# Patient Record
Sex: MALE | Race: OTHER | NOT HISPANIC OR LATINO
[De-identification: names, ages, dates, MRNs, and addresses within clinical notes are randomized per-mention and may not be internally consistent; named-entity substitution may affect disease eponyms.]

---

## 2017-09-11 ENCOUNTER — RECORD ABSTRACTING (OUTPATIENT)
Age: 62
End: 2017-09-11

## 2017-09-11 DIAGNOSIS — M62.830 MUSCLE SPASM OF BACK: ICD-10-CM

## 2017-09-11 DIAGNOSIS — Z80.0 FAMILY HISTORY OF MALIGNANT NEOPLASM OF DIGESTIVE ORGANS: ICD-10-CM

## 2017-09-11 DIAGNOSIS — E78.5 HYPERLIPIDEMIA, UNSPECIFIED: ICD-10-CM

## 2017-09-11 DIAGNOSIS — Z87.09 PERSONAL HISTORY OF OTHER DISEASES OF THE RESPIRATORY SYSTEM: ICD-10-CM

## 2017-09-11 DIAGNOSIS — R53.83 OTHER MALAISE: ICD-10-CM

## 2017-09-11 DIAGNOSIS — Z87.891 PERSONAL HISTORY OF NICOTINE DEPENDENCE: ICD-10-CM

## 2017-09-11 DIAGNOSIS — Z80.42 FAMILY HISTORY OF MALIGNANT NEOPLASM OF PROSTATE: ICD-10-CM

## 2017-09-11 DIAGNOSIS — M48.06 SPINAL STENOSIS, LUMBAR REGION: ICD-10-CM

## 2017-09-11 DIAGNOSIS — R53.81 OTHER MALAISE: ICD-10-CM

## 2017-09-12 ENCOUNTER — APPOINTMENT (OUTPATIENT)
Dept: FAMILY MEDICINE | Facility: CLINIC | Age: 62
End: 2017-09-12
Payer: COMMERCIAL

## 2017-09-12 VITALS — HEIGHT: 70 IN | DIASTOLIC BLOOD PRESSURE: 94 MMHG | SYSTOLIC BLOOD PRESSURE: 150 MMHG

## 2017-09-12 PROCEDURE — 99213 OFFICE O/P EST LOW 20 MIN: CPT

## 2017-09-21 ENCOUNTER — RECORD ABSTRACTING (OUTPATIENT)
Age: 62
End: 2017-09-21

## 2017-10-09 ENCOUNTER — APPOINTMENT (OUTPATIENT)
Dept: FAMILY MEDICINE | Facility: CLINIC | Age: 62
End: 2017-10-09
Payer: COMMERCIAL

## 2017-10-09 VITALS
SYSTOLIC BLOOD PRESSURE: 140 MMHG | HEIGHT: 70 IN | WEIGHT: 180 LBS | DIASTOLIC BLOOD PRESSURE: 90 MMHG | BODY MASS INDEX: 25.77 KG/M2

## 2017-10-09 PROCEDURE — 99213 OFFICE O/P EST LOW 20 MIN: CPT

## 2017-10-09 RX ORDER — ALPRAZOLAM 1 MG/1
1 TABLET ORAL
Qty: 120 | Refills: 0 | Status: DISCONTINUED | COMMUNITY
Start: 2017-03-11 | End: 2017-10-09

## 2017-10-09 RX ORDER — OXYCODONE HYDROCHLORIDE AND ACETAMINOPHEN 10; 325 MG/1; MG/1
10-325 TABLET ORAL
Refills: 0 | Status: DISCONTINUED | COMMUNITY
End: 2017-10-09

## 2017-11-01 ENCOUNTER — RX RENEWAL (OUTPATIENT)
Age: 62
End: 2017-11-01

## 2017-11-13 ENCOUNTER — APPOINTMENT (OUTPATIENT)
Dept: FAMILY MEDICINE | Facility: CLINIC | Age: 62
End: 2017-11-13
Payer: COMMERCIAL

## 2017-11-13 VITALS — DIASTOLIC BLOOD PRESSURE: 80 MMHG | SYSTOLIC BLOOD PRESSURE: 140 MMHG | HEIGHT: 70 IN

## 2017-11-13 PROCEDURE — 99213 OFFICE O/P EST LOW 20 MIN: CPT

## 2017-12-11 ENCOUNTER — APPOINTMENT (OUTPATIENT)
Dept: FAMILY MEDICINE | Facility: CLINIC | Age: 62
End: 2017-12-11
Payer: COMMERCIAL

## 2017-12-11 VITALS — DIASTOLIC BLOOD PRESSURE: 98 MMHG | SYSTOLIC BLOOD PRESSURE: 162 MMHG | HEIGHT: 70 IN

## 2017-12-11 PROCEDURE — 99214 OFFICE O/P EST MOD 30 MIN: CPT

## 2017-12-11 RX ORDER — ALPRAZOLAM 1 MG/1
1 TABLET ORAL 4 TIMES DAILY
Qty: 120 | Refills: 0 | Status: DISCONTINUED | COMMUNITY
Start: 2017-09-12 | End: 2017-12-11

## 2017-12-11 RX ORDER — OXYCODONE AND ACETAMINOPHEN 10; 325 MG/1; MG/1
10-325 TABLET ORAL
Qty: 60 | Refills: 0 | Status: DISCONTINUED | COMMUNITY
Start: 2017-09-12 | End: 2017-12-11

## 2017-12-11 RX ORDER — ALPRAZOLAM 1 MG/1
1 TABLET ORAL 4 TIMES DAILY
Qty: 120 | Refills: 0 | Status: DISCONTINUED | COMMUNITY
Start: 2017-10-09 | End: 2017-12-11

## 2017-12-11 RX ORDER — OXYCODONE AND ACETAMINOPHEN 10; 325 MG/1; MG/1
10-325 TABLET ORAL
Qty: 60 | Refills: 0 | Status: DISCONTINUED | COMMUNITY
Start: 2017-10-09 | End: 2017-12-11

## 2018-01-05 ENCOUNTER — APPOINTMENT (OUTPATIENT)
Dept: FAMILY MEDICINE | Facility: CLINIC | Age: 63
End: 2018-01-05

## 2018-01-10 ENCOUNTER — APPOINTMENT (OUTPATIENT)
Dept: FAMILY MEDICINE | Facility: CLINIC | Age: 63
End: 2018-01-10
Payer: COMMERCIAL

## 2018-01-10 VITALS — HEIGHT: 70 IN | DIASTOLIC BLOOD PRESSURE: 100 MMHG | SYSTOLIC BLOOD PRESSURE: 160 MMHG

## 2018-01-10 DIAGNOSIS — Z00.00 ENCOUNTER FOR GENERAL ADULT MEDICAL EXAMINATION W/OUT ABNORMAL FINDINGS: ICD-10-CM

## 2018-01-10 PROCEDURE — 99214 OFFICE O/P EST MOD 30 MIN: CPT

## 2018-01-10 RX ORDER — ALPRAZOLAM 1 MG/1
1 TABLET ORAL
Qty: 120 | Refills: 0 | Status: DISCONTINUED | COMMUNITY
Start: 2017-11-13 | End: 2018-01-10

## 2018-01-10 RX ORDER — OXYCODONE AND ACETAMINOPHEN 10; 325 MG/1; MG/1
10-325 TABLET ORAL
Qty: 60 | Refills: 0 | Status: DISCONTINUED | COMMUNITY
Start: 2017-11-13 | End: 2018-01-10

## 2018-01-10 RX ORDER — CITALOPRAM 40 MG/1
40 TABLET, FILM COATED ORAL
Refills: 0 | Status: DISCONTINUED | COMMUNITY
Start: 2017-09-21 | End: 2018-01-10

## 2018-01-10 RX ORDER — NAPROXEN 500 MG/1
500 TABLET ORAL
Qty: 60 | Refills: 0 | Status: DISCONTINUED | COMMUNITY
Start: 2017-06-28 | End: 2018-01-10

## 2018-01-11 PROBLEM — Z00.00 ENCOUNTER FOR PREVENTIVE HEALTH EXAMINATION: Status: ACTIVE | Noted: 2017-09-11

## 2018-02-12 ENCOUNTER — APPOINTMENT (OUTPATIENT)
Dept: FAMILY MEDICINE | Facility: CLINIC | Age: 63
End: 2018-02-12
Payer: SELF-PAY

## 2018-02-12 VITALS — DIASTOLIC BLOOD PRESSURE: 100 MMHG | HEIGHT: 70 IN | SYSTOLIC BLOOD PRESSURE: 160 MMHG

## 2018-02-12 PROCEDURE — 99213 OFFICE O/P EST LOW 20 MIN: CPT

## 2018-02-12 RX ORDER — ALPRAZOLAM 1 MG/1
1 TABLET ORAL
Qty: 120 | Refills: 0 | Status: DISCONTINUED | COMMUNITY
Start: 2017-12-12 | End: 2018-02-12

## 2018-02-12 RX ORDER — OXYCODONE AND ACETAMINOPHEN 10; 325 MG/1; MG/1
10-325 TABLET ORAL
Qty: 60 | Refills: 0 | Status: DISCONTINUED | COMMUNITY
Start: 2018-01-10 | End: 2018-02-12

## 2018-03-14 ENCOUNTER — APPOINTMENT (OUTPATIENT)
Dept: FAMILY MEDICINE | Facility: CLINIC | Age: 63
End: 2018-03-14
Payer: SELF-PAY

## 2018-03-14 VITALS — SYSTOLIC BLOOD PRESSURE: 160 MMHG | HEIGHT: 70 IN | DIASTOLIC BLOOD PRESSURE: 100 MMHG

## 2018-03-14 PROCEDURE — 99213 OFFICE O/P EST LOW 20 MIN: CPT

## 2018-03-14 RX ORDER — ALPRAZOLAM 1 MG/1
1 TABLET ORAL
Qty: 120 | Refills: 0 | Status: DISCONTINUED | COMMUNITY
Start: 2018-01-10 | End: 2018-03-14

## 2018-03-14 RX ORDER — OXYCODONE AND ACETAMINOPHEN 10; 325 MG/1; MG/1
10-325 TABLET ORAL
Qty: 60 | Refills: 0 | Status: DISCONTINUED | COMMUNITY
Start: 2017-12-11 | End: 2018-03-14

## 2018-04-15 ENCOUNTER — RX RENEWAL (OUTPATIENT)
Age: 63
End: 2018-04-15

## 2018-04-16 ENCOUNTER — APPOINTMENT (OUTPATIENT)
Dept: FAMILY MEDICINE | Facility: CLINIC | Age: 63
End: 2018-04-16
Payer: COMMERCIAL

## 2018-04-16 PROCEDURE — 99213 OFFICE O/P EST LOW 20 MIN: CPT

## 2018-05-15 ENCOUNTER — APPOINTMENT (OUTPATIENT)
Dept: FAMILY MEDICINE | Facility: CLINIC | Age: 63
End: 2018-05-15
Payer: COMMERCIAL

## 2018-05-15 VITALS — SYSTOLIC BLOOD PRESSURE: 140 MMHG | HEIGHT: 70 IN | DIASTOLIC BLOOD PRESSURE: 82 MMHG

## 2018-05-15 PROCEDURE — 99213 OFFICE O/P EST LOW 20 MIN: CPT

## 2018-05-15 NOTE — REVIEW OF SYSTEMS
[Joint Pain] : joint pain [Joint Stiffness] : joint stiffness [Muscle Pain] : muscle pain [Muscle Weakness] : muscle weakness [Back Pain] : back pain [Joint Swelling] : no joint swelling [Negative] : Respiratory

## 2018-05-15 NOTE — PHYSICAL EXAM
[No Acute Distress] : no acute distress [Well Nourished] : well nourished [Well Developed] : well developed [Well-Appearing] : well-appearing [No Respiratory Distress] : no respiratory distress  [Clear to Auscultation] : lungs were clear to auscultation bilaterally [Normal Rate] : normal rate  [Regular Rhythm] : with a regular rhythm [Normal S1, S2] : normal S1 and S2 [No Murmur] : no murmur heard [No CVA Tenderness] : no CVA  tenderness [No Spinal Tenderness] : no spinal tenderness [de-identified] : BP elevated.  [de-identified] : Rt knee arthritic and decreased rom

## 2018-05-15 NOTE — HISTORY OF PRESENT ILLNESS
[FreeTextEntry1] : [facility] 1[facility] i qid \par  [facility]  [de-identified] : ISTOP ok.\par Now has insurance. Will be scheduling to see ortho.

## 2018-06-14 ENCOUNTER — APPOINTMENT (OUTPATIENT)
Dept: FAMILY MEDICINE | Facility: CLINIC | Age: 63
End: 2018-06-14
Payer: COMMERCIAL

## 2018-06-14 VITALS
WEIGHT: 180 LBS | DIASTOLIC BLOOD PRESSURE: 84 MMHG | BODY MASS INDEX: 25.77 KG/M2 | SYSTOLIC BLOOD PRESSURE: 142 MMHG | HEIGHT: 70 IN

## 2018-06-14 PROCEDURE — 99214 OFFICE O/P EST MOD 30 MIN: CPT | Mod: 25

## 2018-06-14 PROCEDURE — 36415 COLL VENOUS BLD VENIPUNCTURE: CPT

## 2018-06-14 NOTE — HISTORY OF PRESENT ILLNESS
[FreeTextEntry1] : the patient is here for medication refills on ALPRAZolam 1 mg,  and Oxycodone-acetaminophen  mg  [de-identified] : PT admits to "bottoming out past few weeks." Will be seeing psychiatrist

## 2018-06-14 NOTE — PHYSICAL EXAM
[No Acute Distress] : no acute distress [Well Nourished] : well nourished [Well Developed] : well developed [Well-Appearing] : well-appearing [Speech Grossly Normal] : speech grossly normal [Memory Grossly Normal] : memory grossly normal [Normal Affect] : the affect was normal [Alert and Oriented x3] : oriented to person, place, and time [Normal Mood] : the mood was normal [Normal Insight/Judgement] : insight and judgment were intact

## 2018-07-02 LAB
ALBUMIN SERPL ELPH-MCNC: 5.2 G/DL
ALP BLD-CCNC: 60 U/L
ALT SERPL-CCNC: 39 U/L
ANION GAP SERPL CALC-SCNC: 27 MMOL/L
AST SERPL-CCNC: 28 U/L
BASOPHILS # BLD AUTO: 0.04 K/UL
BASOPHILS NFR BLD AUTO: 0.5 %
BILIRUB SERPL-MCNC: 0.4 MG/DL
BUN SERPL-MCNC: 18 MG/DL
CALCIUM SERPL-MCNC: 10 MG/DL
CHLORIDE SERPL-SCNC: 97 MMOL/L
CHOLEST SERPL-MCNC: 238 MG/DL
CHOLEST/HDLC SERPL: 5.2 RATIO
CO2 SERPL-SCNC: 24 MMOL/L
CREAT SERPL-MCNC: 1.24 MG/DL
EOSINOPHIL # BLD AUTO: 0.08 K/UL
EOSINOPHIL NFR BLD AUTO: 1 %
GLUCOSE SERPL-MCNC: 95 MG/DL
HCT VFR BLD CALC: 47.8 %
HDLC SERPL-MCNC: 46 MG/DL
HGB BLD-MCNC: 15.1 G/DL
IMM GRANULOCYTES NFR BLD AUTO: 0.4 %
LDLC SERPL CALC-MCNC: 152 MG/DL
LYMPHOCYTES # BLD AUTO: 2.58 K/UL
LYMPHOCYTES NFR BLD AUTO: 31.9 %
MAN DIFF?: NORMAL
MCHC RBC-ENTMCNC: 31.6 GM/DL
MCHC RBC-ENTMCNC: 32.4 PG
MCV RBC AUTO: 102.6 FL
MONOCYTES # BLD AUTO: 0.81 K/UL
MONOCYTES NFR BLD AUTO: 10 %
NEUTROPHILS # BLD AUTO: 4.56 K/UL
NEUTROPHILS NFR BLD AUTO: 56.2 %
PLATELET # BLD AUTO: 198 K/UL
POTASSIUM SERPL-SCNC: 3.4 MMOL/L
PROT SERPL-MCNC: 7.7 G/DL
PSA SERPL-MCNC: 2.18 NG/ML
RBC # BLD: 4.66 M/UL
RBC # FLD: 14.3 %
SODIUM SERPL-SCNC: 148 MMOL/L
TRIGL SERPL-MCNC: 200 MG/DL
WBC # FLD AUTO: 8.1 K/UL

## 2018-07-05 ENCOUNTER — TRANSCRIPTION ENCOUNTER (OUTPATIENT)
Age: 63
End: 2018-07-05

## 2018-07-12 ENCOUNTER — APPOINTMENT (OUTPATIENT)
Dept: FAMILY MEDICINE | Facility: CLINIC | Age: 63
End: 2018-07-12
Payer: COMMERCIAL

## 2018-07-12 VITALS
WEIGHT: 180 LBS | DIASTOLIC BLOOD PRESSURE: 98 MMHG | BODY MASS INDEX: 25.2 KG/M2 | SYSTOLIC BLOOD PRESSURE: 160 MMHG | HEIGHT: 71 IN

## 2018-07-12 DIAGNOSIS — M79.672 PAIN IN LEFT FOOT: ICD-10-CM

## 2018-07-12 DIAGNOSIS — M25.532 PAIN IN LEFT WRIST: ICD-10-CM

## 2018-07-12 PROCEDURE — 99214 OFFICE O/P EST MOD 30 MIN: CPT

## 2018-07-12 NOTE — ASSESSMENT
[FreeTextEntry1] : strain l wrist. rec light activity and aspercreme\par \par Foot pain - refer to podiatry

## 2018-07-12 NOTE — HISTORY OF PRESENT ILLNESS
[FreeTextEntry1] : pt is here for medication refills and also here to go over b/w that he recently had done. Pt complaints of having pain on his right foot. painful to walk without sneakers around the house. painful behind the two middle toes. no buumps. Wants to look at his left wrist. hurts to flex and move around. pain travels right up his arm.\par \par oxycodone-acetaminophen  mg, alprazolam 1 mg , indomethacin 50 mg oral tablet.\par \[facility] [place]  [de-identified] : PT admits to "bottoming out past few weeks." seeing psychiatrist

## 2018-07-12 NOTE — PHYSICAL EXAM
[No Acute Distress] : no acute distress [Well Nourished] : well nourished [Well Developed] : well developed [Well-Appearing] : well-appearing [No Respiratory Distress] : no respiratory distress  [Clear to Auscultation] : lungs were clear to auscultation bilaterally [Normal Rate] : normal rate  [Regular Rhythm] : with a regular rhythm [No Joint Swelling] : no joint swelling [Grossly Normal Strength/Tone] : grossly normal strength/tone [Speech Grossly Normal] : speech grossly normal [Memory Grossly Normal] : memory grossly normal [Normal Affect] : the affect was normal [Alert and Oriented x3] : oriented to person, place, and time [Normal Mood] : the mood was normal [Normal Insight/Judgement] : insight and judgment were intact

## 2018-08-10 ENCOUNTER — APPOINTMENT (OUTPATIENT)
Dept: FAMILY MEDICINE | Facility: CLINIC | Age: 63
End: 2018-08-10
Payer: COMMERCIAL

## 2018-08-10 VITALS
DIASTOLIC BLOOD PRESSURE: 82 MMHG | SYSTOLIC BLOOD PRESSURE: 162 MMHG | WEIGHT: 180 LBS | HEIGHT: 71 IN | BODY MASS INDEX: 25.2 KG/M2

## 2018-08-10 VITALS — WEIGHT: 180 LBS | BODY MASS INDEX: 25.2 KG/M2 | HEIGHT: 71 IN

## 2018-08-10 PROCEDURE — 99213 OFFICE O/P EST LOW 20 MIN: CPT

## 2018-08-10 NOTE — HISTORY OF PRESENT ILLNESS
[FreeTextEntry1] : pt is here for medical refill, and to go over bloodwork results.  [de-identified] : PT admits to "bottoming out past few weeks." seeing psychiatrist\par \par Better now

## 2018-08-10 NOTE — HISTORY OF PRESENT ILLNESS
[FreeTextEntry1] : pt is here for medical refill, and to go over bloodwork results.  [de-identified] : PT admits to "bottoming out past few weeks." seeing psychiatrist\par \par Better now

## 2018-08-10 NOTE — ASSESSMENT
[FreeTextEntry1] : strain l wrist. rec light activity and aspercreme\par \par Foof pain - refer to podiatry\par \par Going to [facility] then ortho\par \par Minimize meds

## 2018-08-10 NOTE — HISTORY OF PRESENT ILLNESS
[FreeTextEntry1] : pt is here for medical refill, and to go over bloodwork results.  [de-identified] : PT admits to "bottoming out past few weeks." seeing psychiatrist\par \par Better now

## 2018-09-06 ENCOUNTER — APPOINTMENT (OUTPATIENT)
Dept: FAMILY MEDICINE | Facility: CLINIC | Age: 63
End: 2018-09-06
Payer: COMMERCIAL

## 2018-09-06 VITALS
BODY MASS INDEX: 25.2 KG/M2 | HEIGHT: 71 IN | WEIGHT: 180 LBS | SYSTOLIC BLOOD PRESSURE: 140 MMHG | DIASTOLIC BLOOD PRESSURE: 70 MMHG

## 2018-09-06 PROCEDURE — 99213 OFFICE O/P EST LOW 20 MIN: CPT

## 2018-09-06 RX ORDER — INDOMETHACIN 50 MG/1
50 CAPSULE ORAL
Refills: 0 | Status: DISCONTINUED | COMMUNITY
Start: 2017-09-21 | End: 2018-09-06

## 2018-09-06 NOTE — ASSESSMENT
[FreeTextEntry1] :  Wanted increase in pain med but I refused. Will add meloxicam\par \par \par  \par Minimize meds

## 2018-09-06 NOTE — HISTORY OF PRESENT ILLNESS
[FreeTextEntry1] : pt is here for a medication refill, Alprazolam 1 MG and Oxycodone- Acetamiophen  MG\par \par [facility] [de-identified] : Seen by ortho. Planning PT, steroid injections and eventual hip replacement in jan/feb 2019\par \par

## 2018-10-02 ENCOUNTER — APPOINTMENT (OUTPATIENT)
Dept: FAMILY MEDICINE | Facility: CLINIC | Age: 63
End: 2018-10-02
Payer: COMMERCIAL

## 2018-10-02 VITALS
DIASTOLIC BLOOD PRESSURE: 82 MMHG | HEIGHT: 70 IN | SYSTOLIC BLOOD PRESSURE: 138 MMHG | BODY MASS INDEX: 25.77 KG/M2 | WEIGHT: 180 LBS

## 2018-10-02 PROCEDURE — 99214 OFFICE O/P EST MOD 30 MIN: CPT

## 2018-10-02 NOTE — HISTORY OF PRESENT ILLNESS
[FreeTextEntry1] : Pt is here for medication refill .Alprazolam 1 MG and Oxycodone- Acetaminophen  MG\par \[facility] [place]  [de-identified] : Seen by ortho. Planning PT, steroid injections and eventual hip replacement in jan/feb 2019\par \par Will be seeing ortho again in Nove,mber\par

## 2018-10-07 ENCOUNTER — RX RENEWAL (OUTPATIENT)
Age: 63
End: 2018-10-07

## 2018-11-01 ENCOUNTER — APPOINTMENT (OUTPATIENT)
Dept: FAMILY MEDICINE | Facility: CLINIC | Age: 63
End: 2018-11-01
Payer: COMMERCIAL

## 2018-11-01 VITALS
BODY MASS INDEX: 25.77 KG/M2 | WEIGHT: 180 LBS | SYSTOLIC BLOOD PRESSURE: 144 MMHG | DIASTOLIC BLOOD PRESSURE: 80 MMHG | HEIGHT: 70 IN

## 2018-11-01 PROCEDURE — 90686 IIV4 VACC NO PRSV 0.5 ML IM: CPT

## 2018-11-01 PROCEDURE — 99213 OFFICE O/P EST LOW 20 MIN: CPT | Mod: 25

## 2018-11-01 PROCEDURE — G0008: CPT

## 2018-11-05 NOTE — HISTORY OF PRESENT ILLNESS
[FreeTextEntry1] : Patient is here for medication refill. Alprazolam 1 MG, Oxycodone- Acetaminophen  MG and TriamtereneHCTZ 37.5-25 MG.\par \par [facility] [de-identified] : Seen by ortho. Planning PT, steroid injections and eventual hip replacement in jan/feb 2019\par \par Will be seeing ortho again in Nove,mber\par

## 2018-11-29 ENCOUNTER — APPOINTMENT (OUTPATIENT)
Dept: FAMILY MEDICINE | Facility: CLINIC | Age: 63
End: 2018-11-29
Payer: COMMERCIAL

## 2018-11-29 VITALS
HEIGHT: 70 IN | BODY MASS INDEX: 25.77 KG/M2 | WEIGHT: 180 LBS | SYSTOLIC BLOOD PRESSURE: 134 MMHG | DIASTOLIC BLOOD PRESSURE: 82 MMHG

## 2018-11-29 PROCEDURE — 99213 OFFICE O/P EST LOW 20 MIN: CPT

## 2018-11-29 NOTE — HISTORY OF PRESENT ILLNESS
[FreeTextEntry1] : The patient is here for medication refills on Alprazolam 1 mg, Oxycodone-acetaminophen  mg, and Triamterene-hctz 37.5-25 mg [de-identified] : says he has been seen by ortho and most likely will have surgery in Feb

## 2018-12-28 ENCOUNTER — APPOINTMENT (OUTPATIENT)
Dept: FAMILY MEDICINE | Facility: CLINIC | Age: 63
End: 2018-12-28
Payer: SELF-PAY

## 2018-12-28 VITALS
BODY MASS INDEX: 33.99 KG/M2 | DIASTOLIC BLOOD PRESSURE: 84 MMHG | HEIGHT: 61 IN | SYSTOLIC BLOOD PRESSURE: 144 MMHG | WEIGHT: 180 LBS

## 2018-12-28 PROCEDURE — 99213 OFFICE O/P EST LOW 20 MIN: CPT

## 2018-12-30 NOTE — HISTORY OF PRESENT ILLNESS
[FreeTextEntry1] : The patient is here for medication refills on Alprazolam 1 mg, , oxycodone-acetaminophen  mg\par \par [facility] [de-identified] : Patient hasn't been feeling good since Wednesday. Believes he might have a stomach virus.

## 2019-01-24 ENCOUNTER — APPOINTMENT (OUTPATIENT)
Dept: FAMILY MEDICINE | Facility: CLINIC | Age: 64
End: 2019-01-24
Payer: SELF-PAY

## 2019-01-24 VITALS
DIASTOLIC BLOOD PRESSURE: 80 MMHG | BODY MASS INDEX: 25.77 KG/M2 | SYSTOLIC BLOOD PRESSURE: 142 MMHG | WEIGHT: 180 LBS | HEIGHT: 70 IN

## 2019-01-24 PROCEDURE — 99213 OFFICE O/P EST LOW 20 MIN: CPT

## 2019-01-28 NOTE — PHYSICAL EXAM
[No Acute Distress] : no acute distress [Well Nourished] : well nourished [Well Developed] : well developed [Well-Appearing] : well-appearing [de-identified] : Nodules on palm

## 2019-01-28 NOTE — HISTORY OF PRESENT ILLNESS
[FreeTextEntry8] : Patient is here for two lumps on the inside of his left hand that have been getting bigger recently. Patient admits that he has been here before in the past because of this and it is getting to the point where he can not use his hand because they are on a nerve in his hand. Also needs a refill on Alprazolam 1 MG and Oxycodone- Acetaminophen  MG.\par \par [facility]

## 2019-02-26 ENCOUNTER — APPOINTMENT (OUTPATIENT)
Dept: FAMILY MEDICINE | Facility: CLINIC | Age: 64
End: 2019-02-26
Payer: SELF-PAY

## 2019-02-26 VITALS
HEIGHT: 70 IN | DIASTOLIC BLOOD PRESSURE: 112 MMHG | BODY MASS INDEX: 25.77 KG/M2 | SYSTOLIC BLOOD PRESSURE: 162 MMHG | WEIGHT: 180 LBS

## 2019-02-26 PROCEDURE — 99213 OFFICE O/P EST LOW 20 MIN: CPT

## 2019-02-26 NOTE — HISTORY OF PRESENT ILLNESS
[FreeTextEntry1] : Patient is here for medication refill. Alprazolam 1 MG, Oxycodone- Acetaminophen 7.5-325 MG.\par Pt reports he stopped taking Triamterene HCTZ 37.5-25 MG about 3 months ago, would like to try to control his BP on his own, but mentions he drank a lot of coffee this morning and knows that his BP will be high this morning.  \par  \par [facility] [de-identified] : says he has been seen by ortho and most likely will have surgery in Feb\par But no insurance in place yet\par \par Drinking excessive amount of coffee and has not taken any BP meds for about 3 months

## 2019-02-26 NOTE — PHYSICAL EXAM
[No Acute Distress] : no acute distress [Well Nourished] : well nourished [Well Developed] : well developed [Well-Appearing] : well-appearing [No Respiratory Distress] : no respiratory distress  [Clear to Auscultation] : lungs were clear to auscultation bilaterally [Normal Rate] : normal rate  [Regular Rhythm] : with a regular rhythm [Normal S1, S2] : normal S1 and S2 [No Murmur] : no murmur heard [de-identified] : BP ekevated [de-identified] : Nodules on palm

## 2019-03-25 ENCOUNTER — APPOINTMENT (OUTPATIENT)
Dept: FAMILY MEDICINE | Facility: CLINIC | Age: 64
End: 2019-03-25
Payer: SELF-PAY

## 2019-03-25 VITALS
HEIGHT: 70 IN | DIASTOLIC BLOOD PRESSURE: 80 MMHG | WEIGHT: 180 LBS | SYSTOLIC BLOOD PRESSURE: 132 MMHG | BODY MASS INDEX: 25.77 KG/M2

## 2019-03-25 PROCEDURE — 99213 OFFICE O/P EST LOW 20 MIN: CPT

## 2019-03-25 NOTE — HISTORY OF PRESENT ILLNESS
[FreeTextEntry1] : The patient is here for medication refills on Alprazolam 1 mg, and oxycodone  mg. \par \par [facility]  [de-identified] : says he has been seen by ortho and most likely will have surgery \par But no insurance in place yet\par \par

## 2019-03-25 NOTE — PHYSICAL EXAM
[No Acute Distress] : no acute distress [Well Nourished] : well nourished [Well Developed] : well developed [Well-Appearing] : well-appearing [No Respiratory Distress] : no respiratory distress  [Clear to Auscultation] : lungs were clear to auscultation bilaterally [Normal Rate] : normal rate  [Regular Rhythm] : with a regular rhythm [Normal S1, S2] : normal S1 and S2 [No Murmur] : no murmur heard [de-identified] : Nodules on palm

## 2019-04-20 ENCOUNTER — APPOINTMENT (OUTPATIENT)
Dept: FAMILY MEDICINE | Facility: CLINIC | Age: 64
End: 2019-04-20
Payer: SELF-PAY

## 2019-04-20 VITALS
HEIGHT: 70 IN | DIASTOLIC BLOOD PRESSURE: 82 MMHG | BODY MASS INDEX: 25.77 KG/M2 | WEIGHT: 180 LBS | SYSTOLIC BLOOD PRESSURE: 130 MMHG

## 2019-04-20 PROCEDURE — 99213 OFFICE O/P EST LOW 20 MIN: CPT

## 2019-04-20 NOTE — PHYSICAL EXAM
[No Acute Distress] : no acute distress [Well Nourished] : well nourished [Well Developed] : well developed [No Respiratory Distress] : no respiratory distress  [Well-Appearing] : well-appearing [Clear to Auscultation] : lungs were clear to auscultation bilaterally [Normal Rate] : normal rate  [Regular Rhythm] : with a regular rhythm [Normal S1, S2] : normal S1 and S2 [No Murmur] : no murmur heard [de-identified] : Nodules on palm

## 2019-04-20 NOTE — PLAN
[FreeTextEntry1] : PT ADVISED THIS IS THE LAST RX FOR PERCOCET.ADVISED TO SEEK  PAIN MANAGEMENT  [facility]\par RE START MELOXICAM

## 2019-04-20 NOTE — HISTORY OF PRESENT ILLNESS
[FreeTextEntry1] : medication refill. Alprazolam 1 MG, Oxycodone- Acetaminophen  MG.\par \par [facility] [place] [de-identified] : says he has been seen by ortho and most likely will have surgery \par But no insurance in place yet\par \par

## 2019-05-01 ENCOUNTER — APPOINTMENT (OUTPATIENT)
Dept: FAMILY MEDICINE | Facility: CLINIC | Age: 64
End: 2019-05-01
Payer: SELF-PAY

## 2019-05-01 VITALS — SYSTOLIC BLOOD PRESSURE: 190 MMHG | OXYGEN SATURATION: 98 % | DIASTOLIC BLOOD PRESSURE: 110 MMHG | HEART RATE: 65 BPM

## 2019-05-01 VITALS
BODY MASS INDEX: 25.48 KG/M2 | HEIGHT: 70 IN | DIASTOLIC BLOOD PRESSURE: 102 MMHG | WEIGHT: 178 LBS | SYSTOLIC BLOOD PRESSURE: 160 MMHG

## 2019-05-01 DIAGNOSIS — Z86.19 PERSONAL HISTORY OF OTHER INFECTIOUS AND PARASITIC DISEASES: ICD-10-CM

## 2019-05-01 DIAGNOSIS — I16.0 HYPERTENSIVE URGENCY: ICD-10-CM

## 2019-05-01 DIAGNOSIS — H53.8 OTHER VISUAL DISTURBANCES: ICD-10-CM

## 2019-05-01 DIAGNOSIS — R11.0 NAUSEA: ICD-10-CM

## 2019-05-01 PROCEDURE — 99214 OFFICE O/P EST MOD 30 MIN: CPT

## 2019-05-01 RX ORDER — TRIAMTERENE AND HYDROCHLOROTHIAZIDE 37.5; 25 MG/1; MG/1
37.5-25 CAPSULE ORAL DAILY
Qty: 90 | Refills: 1 | Status: DISCONTINUED | COMMUNITY
Start: 2018-02-12 | End: 2019-05-01

## 2019-05-01 RX ORDER — LISINOPRIL 40 MG/1
40 TABLET ORAL
Qty: 90 | Refills: 1 | Status: DISCONTINUED | COMMUNITY
Start: 2017-05-18 | End: 2019-05-01

## 2019-05-01 NOTE — ASSESSMENT
[FreeTextEntry1] : Sent to hospital immediately.\par Needs STAT labs and CT/MRI head urgently.\par \par Mike expresses apprehension with going to the hospital due to lack of health insurance.\par \par Explained that the risks are too high and that he needs to go the hospital immediately, he expresses understanding.

## 2019-05-01 NOTE — HISTORY OF PRESENT ILLNESS
[FreeTextEntry8] : Mike is a 64-year old male who presents with a 1-week history of very severe dizziness, blurry vision, and headaches. He reports that as a result he has been off balance - falling over to the left 3 times (no head trauma because he was able to catch himself). Also associated with nausea (no vomiting), diarrhea, chills, headaches, seeing dots, and buzzing in ears. Nothing makes it better. \par Reports that something similar happened 6-7 years ago - at the time he was hospitalized for about 5 days. However, testing was inconclusive.\par \par Reports that he has not been taking BP medication for 3 months.\par \par The patient presents today because he has been feeling off balance since last Wednesday morning. pt states he felt nausea, chills, and diarrhea. he believes it might be vertigo. pt has a hard time walking across the room,  pt fell 3 times over the past week. Pt reports he feels very dizzy, blurry vision, headaches. he can't drive. feels very fatigue. is holding on to objects in order to walk.

## 2019-05-01 NOTE — PHYSICAL EXAM
[Normal Sclera/Conjunctiva] : normal sclera/conjunctiva [PERRL] : pupils equal round and reactive to light [EOMI] : extraocular movements intact [Normal Outer Ear/Nose] : the outer ears and nose were normal in appearance [Normal TMs] : both tympanic membranes were normal [Normal Oropharynx] : the oropharynx was normal [No Lymphadenopathy] : no lymphadenopathy [Clear to Auscultation] : lungs were clear to auscultation bilaterally [No Respiratory Distress] : no respiratory distress  [Normal Rate] : normal rate  [No Accessory Muscle Use] : no accessory muscle use [Normal S1, S2] : normal S1 and S2 [No Carotid Bruits] : no carotid bruits [Pedal Pulses Present] : the pedal pulses are present [No Edema] : there was no peripheral edema [Non-distended] : non-distended [No Focal Deficits] : no focal deficits [Normal Insight/Judgement] : insight and judgment were intact [Alert and Oriented x3] : oriented to person, place, and time [de-identified] : mild distress [de-identified] : unsteady gait

## 2019-05-09 ENCOUNTER — APPOINTMENT (OUTPATIENT)
Dept: FAMILY MEDICINE | Facility: CLINIC | Age: 64
End: 2019-05-09
Payer: SELF-PAY

## 2019-05-09 VITALS
BODY MASS INDEX: 25.48 KG/M2 | SYSTOLIC BLOOD PRESSURE: 150 MMHG | WEIGHT: 178 LBS | HEIGHT: 70 IN | DIASTOLIC BLOOD PRESSURE: 80 MMHG

## 2019-05-09 PROCEDURE — 99213 OFFICE O/P EST LOW 20 MIN: CPT

## 2019-05-13 NOTE — HISTORY OF PRESENT ILLNESS
[Post-hospitalization from ___ Hospital] : Post-hospitalization from [unfilled] Hospital [Admitted on: ___] : The patient was admitted on [unfilled] [Discharged on ___] : discharged on [unfilled] [Discharge Summary] : discharge summary [Pertinent Labs] : pertinent labs [Radiology Findings] : radiology findings [Discharge Med List] : discharge medication list [Med Reconciliation] : medication reconciliation has been completed [FreeTextEntry2] : Patient reports he is better but is still dizzy, and off balance. Reports it is better than it was. He is able to drive and work but is very cautious. Will drive very slow, and will not climb ladders at work. \par Pt was given new medications. Amlodipine 10 Mg, Aspirin 81 MG, Hydrochlorothiazide 25 MG, Thiamine 100 MG.\par cardiac and neuro work ip was neg

## 2019-05-21 ENCOUNTER — APPOINTMENT (OUTPATIENT)
Dept: FAMILY MEDICINE | Facility: CLINIC | Age: 64
End: 2019-05-21
Payer: SELF-PAY

## 2019-05-21 VITALS
WEIGHT: 178 LBS | DIASTOLIC BLOOD PRESSURE: 80 MMHG | HEIGHT: 70 IN | SYSTOLIC BLOOD PRESSURE: 140 MMHG | BODY MASS INDEX: 25.48 KG/M2

## 2019-05-21 DIAGNOSIS — H81.09 MENIERE'S DISEASE, UNSPECIFIED EAR: ICD-10-CM

## 2019-05-21 DIAGNOSIS — Z86.59 PERSONAL HISTORY OF OTHER MENTAL AND BEHAVIORAL DISORDERS: ICD-10-CM

## 2019-05-21 PROCEDURE — 99213 OFFICE O/P EST LOW 20 MIN: CPT

## 2019-05-27 NOTE — HISTORY OF PRESENT ILLNESS
[FreeTextEntry1] : Patient is here for a follow up with complaints that he is nothing doing better. Pt states he is still dizzy and off balance and nothing is getting better from the last time he was here.  Also needs medication refill, Alprazolam 1 MG, and possibly a half supply of Oxycodone- acetaminophen  MG.\par [facility] \[facility]  [de-identified] : says he has been seen by ortho and most likely will have surgery \par But no insurance in place yet\par  and as per cc\par

## 2019-06-06 ENCOUNTER — APPOINTMENT (OUTPATIENT)
Dept: OTOLARYNGOLOGY | Facility: CLINIC | Age: 64
End: 2019-06-06
Payer: MEDICAID

## 2019-06-06 VITALS
BODY MASS INDEX: 25.77 KG/M2 | WEIGHT: 180 LBS | HEART RATE: 71 BPM | HEIGHT: 70 IN | DIASTOLIC BLOOD PRESSURE: 89 MMHG | SYSTOLIC BLOOD PRESSURE: 157 MMHG

## 2019-06-06 DIAGNOSIS — Z78.9 OTHER SPECIFIED HEALTH STATUS: ICD-10-CM

## 2019-06-06 DIAGNOSIS — H90.3 SENSORINEURAL HEARING LOSS, BILATERAL: ICD-10-CM

## 2019-06-06 DIAGNOSIS — R42 DIZZINESS AND GIDDINESS: ICD-10-CM

## 2019-06-06 DIAGNOSIS — D35.00 BENIGN NEOPLASM OF UNSPECIFIED ADRENAL GLAND: ICD-10-CM

## 2019-06-06 PROCEDURE — 92557 COMPREHENSIVE HEARING TEST: CPT

## 2019-06-06 PROCEDURE — 99203 OFFICE O/P NEW LOW 30 MIN: CPT | Mod: 25

## 2019-06-06 NOTE — REVIEW OF SYSTEMS
[Dizziness] : dizziness [Vertigo] : vertigo [Anxiety] : anxiety [Lightheadedness] : lightheadedness [Negative] : Heme/Lymph [FreeTextEntry1] : headache  fatigue
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
## 2019-06-06 NOTE — CONSULT LETTER
[Dear  ___] : Dear  [unfilled], [Consult Letter:] : I had the pleasure of evaluating your patient, [unfilled]. [Please see my note below.] : Please see my note below. [Consult Closing:] : Thank you very much for allowing me to participate in the care of this patient.  If you have any questions, please do not hesitate to contact me. [Sincerely,] : Sincerely, [FreeTextEntry3] : Sincerely,\par \par Abdulkadir Phillips MD., FACS\par

## 2019-06-06 NOTE — DATA REVIEWED
[de-identified] : audio reviewed today - bilat snhl with A tymp [de-identified] : reports from SB reviewed

## 2019-06-06 NOTE — ASSESSMENT
[FreeTextEntry1] : Patient with intermittent severe headaches which have been related to severe spikes in BP.  Work up so far negative.  Does feel lightheaded during episodes but no episodes of spinning.  Also gives hx of blackouts during episodes.  Had similar issue about 10 years ago.  Now feels improved but has occ headache.  \par Feel current issue is related to hypertension and likely not vestibular.  Would consider also atypical migraines.  Also, while unlikely feel consideration of pheo or paraganglioma shoud be considered.  I ordered screening plasma metanephrine.  Also discussed further neurology eval and consider of endocrinology eval.

## 2019-06-17 LAB
METANEPHRINE, PL: 35 PG/ML
NORMETANEPHRINE, PL: 138 PG/ML

## 2019-06-21 ENCOUNTER — APPOINTMENT (OUTPATIENT)
Dept: FAMILY MEDICINE | Facility: CLINIC | Age: 64
End: 2019-06-21
Payer: MEDICAID

## 2019-06-21 VITALS
HEIGHT: 70 IN | DIASTOLIC BLOOD PRESSURE: 80 MMHG | WEIGHT: 180 LBS | BODY MASS INDEX: 25.77 KG/M2 | SYSTOLIC BLOOD PRESSURE: 148 MMHG

## 2019-06-21 DIAGNOSIS — G43.009 MIGRAINE W/OUT AURA, NOT INTRACTABLE, W/OUT STATUS MIGRAINOSUS: ICD-10-CM
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
## 2019-06-21 PROCEDURE — 99213 OFFICE O/P EST LOW 20 MIN: CPT | Mod: 25

## 2019-06-21 PROCEDURE — 36415 COLL VENOUS BLD VENIPUNCTURE: CPT

## 2019-06-21 RX ORDER — OXYCODONE AND ACETAMINOPHEN 10; 325 MG/1; MG/1
10-325 TABLET ORAL TWICE DAILY
Qty: 60 | Refills: 0 | Status: DISCONTINUED | COMMUNITY
Start: 2018-06-14 | End: 2019-06-21

## 2019-06-21 RX ORDER — OXYCODONE AND ACETAMINOPHEN 10; 325 MG/1; MG/1
10-325 TABLET ORAL
Qty: 60 | Refills: 0 | Status: DISCONTINUED | COMMUNITY
Start: 2018-03-14 | End: 2019-06-21

## 2019-06-21 RX ORDER — OXYCODONE AND ACETAMINOPHEN 7.5; 325 MG/1; MG/1
7.5-325 TABLET ORAL
Qty: 60 | Refills: 0 | Status: DISCONTINUED | COMMUNITY
Start: 2018-04-16 | End: 2019-06-21

## 2019-06-21 RX ORDER — OXYCODONE AND ACETAMINOPHEN 7.5; 325 MG/1; MG/1
7.5-325 TABLET ORAL
Qty: 60 | Refills: 0 | Status: DISCONTINUED | COMMUNITY
Start: 2018-07-12 | End: 2019-06-21

## 2019-06-21 RX ORDER — OXYCODONE AND ACETAMINOPHEN 10; 325 MG/1; MG/1
10-325 TABLET ORAL
Qty: 60 | Refills: 0 | Status: DISCONTINUED | COMMUNITY
Start: 2018-02-12 | End: 2019-06-21

## 2019-06-21 RX ORDER — OXYCODONE AND ACETAMINOPHEN 10; 325 MG/1; MG/1
10-325 TABLET ORAL
Qty: 60 | Refills: 0 | Status: DISCONTINUED | COMMUNITY
Start: 2018-05-15 | End: 2019-06-21

## 2019-06-21 NOTE — REVIEW OF SYSTEMS
[Headache] : headache [Dizziness] : dizziness [Negative] : Genitourinary [FreeTextEntry9] : as per cc [de-identified] : FRontal

## 2019-06-21 NOTE — HISTORY OF PRESENT ILLNESS
[FreeTextEntry1] : Med refill. Meloxicam 7.5 MG, Alprazolam 1 MG, and amlodipine.\par Would also like to discuss recent headaches and possible starting a medication for that. [de-identified] : Will be seeing neurologist next Friday.\par Doing very well with meloxicam

## 2019-06-21 NOTE — PHYSICAL EXAM
[No Acute Distress] : no acute distress [Well Nourished] : well nourished [Well Developed] : well developed [Well-Appearing] : well-appearing [Normal Gait] : normal gait [Coordination Grossly Intact] : coordination grossly intact [No Focal Deficits] : no focal deficits [Normal Affect] : the affect was normal [Normal Insight/Judgement] : insight and judgment were intact [Alert and Oriented x3] : oriented to person, place, and time [de-identified] : still bouts of vertigo

## 2019-06-27 ENCOUNTER — OTHER (OUTPATIENT)
Age: 64
End: 2019-06-27

## 2019-06-27 LAB
A PHAGOCYTOPH IGG TITR SER IF: NORMAL TITER
B BURGDOR AB SER QL IA: NEGATIVE
B MICROTI IGG TITR SER: NORMAL TITER
E CHAFFEENSIS IGG TITR SER IF: ABNORMAL TITER

## 2019-07-09 ENCOUNTER — APPOINTMENT (OUTPATIENT)
Dept: OTOLARYNGOLOGY | Facility: CLINIC | Age: 64
End: 2019-07-09

## 2019-07-22 ENCOUNTER — APPOINTMENT (OUTPATIENT)
Dept: FAMILY MEDICINE | Facility: CLINIC | Age: 64
End: 2019-07-22
Payer: MEDICAID

## 2019-07-22 VITALS
HEIGHT: 70 IN | DIASTOLIC BLOOD PRESSURE: 80 MMHG | BODY MASS INDEX: 26.63 KG/M2 | WEIGHT: 186 LBS | SYSTOLIC BLOOD PRESSURE: 142 MMHG

## 2019-07-22 DIAGNOSIS — B60.0 BABESIOSIS: ICD-10-CM

## 2019-07-22 PROCEDURE — 99214 OFFICE O/P EST MOD 30 MIN: CPT | Mod: 25

## 2019-07-22 PROCEDURE — 36415 COLL VENOUS BLD VENIPUNCTURE: CPT

## 2019-07-23 ENCOUNTER — APPOINTMENT (OUTPATIENT)
Dept: NEUROLOGY | Facility: CLINIC | Age: 64
End: 2019-07-23

## 2019-07-23 LAB
ALBUMIN SERPL ELPH-MCNC: 4.8 G/DL
ALP BLD-CCNC: 70 U/L
ALT SERPL-CCNC: 24 U/L
ANION GAP SERPL CALC-SCNC: 22 MMOL/L
AST SERPL-CCNC: 20 U/L
BILIRUB SERPL-MCNC: 0.3 MG/DL
BUN SERPL-MCNC: 22 MG/DL
CALCIUM SERPL-MCNC: 9.5 MG/DL
CHLORIDE SERPL-SCNC: 103 MMOL/L
CHOLEST SERPL-MCNC: 249 MG/DL
CHOLEST/HDLC SERPL: 3.5 RATIO
CO2 SERPL-SCNC: 25 MMOL/L
CREAT SERPL-MCNC: 0.97 MG/DL
GLUCOSE SERPL-MCNC: 191 MG/DL
HDLC SERPL-MCNC: 72 MG/DL
LDLC SERPL CALC-MCNC: 143 MG/DL
POTASSIUM SERPL-SCNC: 4 MMOL/L
PROT SERPL-MCNC: 7.2 G/DL
PSA SERPL-MCNC: 2.73 NG/ML
SODIUM SERPL-SCNC: 150 MMOL/L
TRIGL SERPL-MCNC: 170 MG/DL

## 2019-07-25 LAB
BASOPHILS # BLD AUTO: 0.07 K/UL
BASOPHILS NFR BLD AUTO: 0.7 %
EOSINOPHIL # BLD AUTO: 0.09 K/UL
EOSINOPHIL NFR BLD AUTO: 0.9 %
HCT VFR BLD CALC: 48.5 %
HGB BLD-MCNC: 14.7 G/DL
IMM GRANULOCYTES NFR BLD AUTO: 1.2 %
LYMPHOCYTES # BLD AUTO: 1.38 K/UL
LYMPHOCYTES NFR BLD AUTO: 14.3 %
MAN DIFF?: NORMAL
MCHC RBC-ENTMCNC: 30.3 GM/DL
MCHC RBC-ENTMCNC: 32 PG
MCV RBC AUTO: 105.4 FL
MONOCYTES # BLD AUTO: 0.58 K/UL
MONOCYTES NFR BLD AUTO: 6 %
NEUTROPHILS # BLD AUTO: 7.39 K/UL
NEUTROPHILS NFR BLD AUTO: 76.9 %
PLATELET # BLD AUTO: 202 K/UL
RBC # BLD: 4.6 M/UL
RBC # FLD: 14.3 %
WBC # FLD AUTO: 9.63 K/UL

## 2019-07-25 NOTE — HISTORY OF PRESENT ILLNESS
[FreeTextEntry1] : Patient is here for medication refills on Alprazolam 1 mg, Meloxicam 7.5 mg\par \par [facility] \par  [de-identified] : Feels better with antiobiotics

## 2019-07-25 NOTE — PHYSICAL EXAM
[Normal] : normal rate, regular rhythm, normal S1 and S2 and no murmur heard [No Carotid Bruits] : no carotid bruits [No Edema] : there was no peripheral edema

## 2019-07-25 NOTE — REVIEW OF SYSTEMS
[Headache] : headache [Dizziness] : dizziness [Negative] : Genitourinary [FreeTextEntry9] : as per cc [de-identified] : FRontal

## 2019-07-26 LAB
B DIV+MO-1 18S RRNA BLD QL NAA+NON-PROBE: NEGATIVE
B DUNCANI 18S RRNA BLD QL NAA+NON-PROBE: NEGATIVE
B MICROTI 18S RRNA BLD QL NAA+NON-PROBE: NEGATIVE

## 2019-08-30 ENCOUNTER — APPOINTMENT (OUTPATIENT)
Dept: FAMILY MEDICINE | Facility: CLINIC | Age: 64
End: 2019-08-30
Payer: MEDICAID

## 2019-08-30 VITALS
TEMPERATURE: 98.1 F | OXYGEN SATURATION: 98 % | HEART RATE: 68 BPM | RESPIRATION RATE: 16 BRPM | HEIGHT: 70 IN | BODY MASS INDEX: 27.92 KG/M2 | DIASTOLIC BLOOD PRESSURE: 100 MMHG | SYSTOLIC BLOOD PRESSURE: 130 MMHG | WEIGHT: 195 LBS

## 2019-08-30 PROCEDURE — 99213 OFFICE O/P EST LOW 20 MIN: CPT

## 2019-08-30 RX ORDER — MELOXICAM 7.5 MG/1
7.5 TABLET ORAL
Qty: 60 | Refills: 2 | Status: DISCONTINUED | COMMUNITY
Start: 2018-09-06 | End: 2019-08-30

## 2019-08-30 RX ORDER — TRIAMTERENE AND HYDROCHLOROTHIAZIDE 25; 37.5 MG/1; MG/1
37.5-25 TABLET ORAL
Refills: 0 | Status: DISCONTINUED | COMMUNITY
End: 2019-08-30

## 2019-08-30 NOTE — PHYSICAL EXAM
[Normal] : no respiratory distress, lungs were clear to auscultation bilaterally and no accessory muscle use [No Carotid Bruits] : no carotid bruits [No Edema] : there was no peripheral edema

## 2019-08-30 NOTE — HISTORY OF PRESENT ILLNESS
[FreeTextEntry1] : Pt is here for Rx renewal: Alprazolam, TDap shot\par [facility] [de-identified] : Still needs ortho surgery of hip

## 2019-08-30 NOTE — REVIEW OF SYSTEMS
[Headache] : headache [Dizziness] : dizziness [Negative] : Genitourinary [FreeTextEntry9] : as per cc [de-identified] : FRontal

## 2019-08-30 NOTE — HEALTH RISK ASSESSMENT
[] : Yes [Yes] : Yes [2 - 4 times a month (2 pts)] : 2-4 times a month (2 points) [Less than monthly (1 pt)] : Less than monthly (1 point) [3 or 4 (1 pt)] : 3 or 4  (1 point) [No] : In the past 12 months have you used drugs other than those required for medical reasons? No

## 2019-09-05 ENCOUNTER — APPOINTMENT (OUTPATIENT)
Dept: PHYSICAL MEDICINE AND REHAB | Facility: CLINIC | Age: 64
End: 2019-09-05
Payer: MEDICAID

## 2019-09-05 VITALS
DIASTOLIC BLOOD PRESSURE: 98 MMHG | SYSTOLIC BLOOD PRESSURE: 183 MMHG | HEIGHT: 70 IN | BODY MASS INDEX: 28.06 KG/M2 | WEIGHT: 196 LBS | HEART RATE: 65 BPM

## 2019-09-05 DIAGNOSIS — Z86.79 PERSONAL HISTORY OF OTHER DISEASES OF THE CIRCULATORY SYSTEM: ICD-10-CM

## 2019-09-05 PROCEDURE — 99203 OFFICE O/P NEW LOW 30 MIN: CPT

## 2019-09-05 PROCEDURE — 73521 X-RAY EXAM HIPS BI 2 VIEWS: CPT

## 2019-09-05 RX ORDER — L ACID,RHAMN/B.INFANTIS,LONGUM 2B CELL
100 CAPSULE, SPRINKLE ORAL
Qty: 14 | Refills: 0 | Status: ACTIVE | COMMUNITY
Start: 2019-05-02

## 2019-09-05 NOTE — REVIEW OF SYSTEMS
[Recent Change In Weight] : ~T recent weight change [Joint Pain] : joint pain [Joint Stiffness] : joint stiffness [Negative] : Heme/Lymph [Anxiety] : anxiety

## 2019-09-05 NOTE — SOCIAL HISTORY
[Private Home] : in a private home [Stairs inside the home?] : stairs inside the home [No Device Needed] : Patient doesn't use a device for ambulation

## 2019-09-05 NOTE — HISTORY OF PRESENT ILLNESS
[FreeTextEntry1] : 64 y.o. M presents to office w/ c/o b/l hip pain.  Pt. was told that he needed b/l NIKKO in 2004.  Has h/o ??? congenital hip dysplasia (was in body cast age 7).  [facility].  Images not available for my review.  Pt. was told that he has "bone on bone" disease.  No recent P.T. (last course 2 years ago - not very helpful) or injections.  Tries to do HEP.  Pt. was taking oxycodone but D/C'd 3-4 months ago.  Now, takes meloxicam 7.5 mg bid - no longer helpful.  Was Rx'd ibuprofen 800 mg but not taking.  Has appt. to see Ortho MD Dr. Arambula in October.  Of note, pt. has Rx for alprazolam 1 mg which he takes for anxiety and sleep.\par \par Pt. denies LBP.  Pt. c/o R groin pain radiating down anterior thigh towards knee.  Also, c/o posterior hip discomfort with prolonged sitting.  No N/T/B/W in legs/feet.
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
## 2019-09-05 NOTE — PHYSICAL EXAM
[FreeTextEntry1] : NAD.  PROM R hip - 25' IR w/ pain; 40' ER.  L hip 25-30' IR/ER.  MMT 5/5 B/L HF/KE/DF/PF.  DTR's 2+ knees/ankles.  SILT.  L-spine ROM - near-full forward flexion w/o pain.  Extension 10' w/ hip pain.

## 2019-09-05 NOTE — ASSESSMENT
[FreeTextEntry1] : Pt. w/ advanced b/l femoroacetabular OA.  X-rays reviewed w/ patient.  Will be evaluated by Ortho end of October for NIKKO.  Will Rx P.T. for modalities, gentle ROM, stretching, strengthening and development of HEP.  Discussed R/B/A to US-guided corticosteroid injection into right hip.  Pt. will consider.  Cautioned patient on chronic use of PO NSAIDS 2' GI/cardiac/renal toxicities.  Will Rx low dose oxycodone 5 mg; 1 tab po bid prn #14 to bridge patient until Ortho visit.  Further discussed R/B/A to tx w/ narcotic analgesics including but not limited to respiratory depression, CNS depression, constipation, and untimely death if not used as directed.  Pt. instructed not to take w/ benzodiazepine.  I-STOP DATABASE CHECKED.  Pt. is in agreement with plan.  RTC after Ortho appt. or to schedule hip injection.

## 2019-09-19 ENCOUNTER — RX RENEWAL (OUTPATIENT)
Age: 64
End: 2019-09-19

## 2019-09-26 ENCOUNTER — APPOINTMENT (OUTPATIENT)
Dept: FAMILY MEDICINE | Facility: CLINIC | Age: 64
End: 2019-09-26
Payer: MEDICAID

## 2019-09-26 VITALS
SYSTOLIC BLOOD PRESSURE: 140 MMHG | BODY MASS INDEX: 28.49 KG/M2 | WEIGHT: 199 LBS | DIASTOLIC BLOOD PRESSURE: 84 MMHG | HEIGHT: 70 IN

## 2019-09-26 PROCEDURE — 99213 OFFICE O/P EST LOW 20 MIN: CPT

## 2019-09-26 RX ORDER — MECLIZINE HYDROCHLORIDE 12.5 MG/1
12.5 TABLET ORAL
Qty: 30 | Refills: 1 | Status: DISCONTINUED | COMMUNITY
Start: 2019-05-09 | End: 2019-09-26

## 2019-09-26 NOTE — HISTORY OF PRESENT ILLNESS
[FreeTextEntry1] : Patient is here for medication refills  [de-identified] : was given oxycodone by [facility]\par advised not to tale with benzo

## 2019-09-26 NOTE — REVIEW OF SYSTEMS
[Headache] : headache [Dizziness] : dizziness [Negative] : Respiratory [FreeTextEntry9] : as per cc [de-identified] : FRontal

## 2019-09-26 NOTE — PHYSICAL EXAM
[Normal] : normal rate, regular rhythm, normal S1 and S2 and no murmur heard [No Edema] : there was no peripheral edema [No Carotid Bruits] : no carotid bruits

## 2019-09-26 NOTE — PLAN
[FreeTextEntry1] : advised not to take oxycodone,\par Ibuprofen sent in.\par Meloxicam wasn’t working\par Needs rt hip replacement but wants to wait till feb due to work

## 2019-10-15 ENCOUNTER — OTHER (OUTPATIENT)
Age: 64
End: 2019-10-15

## 2019-10-16 ENCOUNTER — RX RENEWAL (OUTPATIENT)
Age: 64
End: 2019-10-16

## 2019-10-22 ENCOUNTER — APPOINTMENT (OUTPATIENT)
Dept: ORTHOPEDIC SURGERY | Facility: CLINIC | Age: 64
End: 2019-10-22
Payer: MEDICAID

## 2019-10-22 VITALS
WEIGHT: 199 LBS | BODY MASS INDEX: 28.49 KG/M2 | DIASTOLIC BLOOD PRESSURE: 109 MMHG | HEART RATE: 85 BPM | SYSTOLIC BLOOD PRESSURE: 184 MMHG | HEIGHT: 70 IN

## 2019-10-22 PROCEDURE — 99205 OFFICE O/P NEW HI 60 MIN: CPT

## 2019-10-22 PROCEDURE — 73502 X-RAY EXAM HIP UNI 2-3 VIEWS: CPT

## 2019-10-22 RX ORDER — ALPRAZOLAM 1 MG/1
1 TABLET ORAL
Qty: 120 | Refills: 0 | Status: DISCONTINUED | COMMUNITY
Start: 2018-03-14 | End: 2019-10-22

## 2019-10-22 RX ORDER — ALPRAZOLAM 1 MG/1
1 TABLET ORAL
Qty: 90 | Refills: 0 | Status: DISCONTINUED | COMMUNITY
Start: 2018-07-12 | End: 2019-10-22

## 2019-10-22 RX ORDER — DOXYCYCLINE 100 MG/1
100 TABLET, FILM COATED ORAL TWICE DAILY
Qty: 30 | Refills: 0 | Status: DISCONTINUED | COMMUNITY
Start: 2019-06-27 | End: 2019-10-22

## 2019-10-22 RX ORDER — ALPRAZOLAM 1 MG/1
1 TABLET ORAL
Qty: 90 | Refills: 0 | Status: DISCONTINUED | COMMUNITY
Start: 2018-04-16 | End: 2019-10-22

## 2019-10-22 RX ORDER — OXYCODONE 5 MG/1
5 TABLET ORAL TWICE DAILY
Qty: 28 | Refills: 0 | Status: DISCONTINUED | COMMUNITY
Start: 2019-09-05 | End: 2019-10-22

## 2019-10-22 RX ORDER — ALPRAZOLAM 1 MG/1
1 TABLET ORAL 4 TIMES DAILY
Qty: 120 | Refills: 0 | Status: DISCONTINUED | COMMUNITY
Start: 2018-06-14 | End: 2019-10-22

## 2019-10-22 RX ORDER — ASPIRIN 81 MG/1
81 TABLET, CHEWABLE ORAL
Qty: 30 | Refills: 0 | Status: DISCONTINUED | COMMUNITY
Start: 2019-05-02 | End: 2019-10-22

## 2019-10-22 RX ORDER — AMLODIPINE BESYLATE 10 MG/1
10 TABLET ORAL
Qty: 90 | Refills: 0 | Status: DISCONTINUED | COMMUNITY
Start: 2019-05-02 | End: 2019-10-22

## 2019-10-22 RX ORDER — ALPRAZOLAM 1 MG/1
1 TABLET ORAL
Qty: 120 | Refills: 0 | Status: DISCONTINUED | COMMUNITY
Start: 2018-02-12 | End: 2019-10-22

## 2019-10-22 NOTE — DISCUSSION/SUMMARY
[de-identified] : The patient is a 64 year old male with bone on bone arthritis of the right hip. He was given a prescription for Celebrex. Based upon the patients continued symptoms and failure to respond to conservative treatment I have recommended a right total hip replacement for the patient. A discussion was had with the patient regarding a right total hip replacement. Anterior and posterior exposures were discussed. The patient would like to proceed with an anterior approach. A long discussion was had with the patient as what the total joint replacement would entail. A model was used to demonstrate the operation and to discuss bearing surfaces of the implants. The hospitalization and rehabilitation were discussed. The use of perioperative antibiotics and DVT prophylaxis were discussed. The risks, benefits and alternatives to surgical intervention were discussed at length with the patient. Specific risks discussed included: infection, wound breakdown, numbness and damage to nerves, tendon, muscle, arteries or other blood vessels, and the possibility of leg length discrepancy. The possibility of recurrent pain, no improvement in pain and actual worsening of the pain were also mentioned in conversation with the patient. Medical complications related to the patient's general medical health including deep vein thrombosis, pulmonary embolus, heart attack, stroke, death and other complications from anesthesia were discussed as well. The patient was told that we will take steps to minimize these risks by using sterile technique, antibiotics and DVT prophylaxis when appropriate and following the patient postoperatively in the clinic setting to monitor progress. The benefits of surgery were discussed with the patient including the potential to improve the current clinical condition through operative intervention. Alternatives to surgical intervention include continued conservative management which may yield less than optimal results in this particular patient. All questions were answered to the satisfaction of the patient. Models were used as an educational tool. We did discuss implant choices and fixation, with shared decision making with the patient.\par

## 2019-10-22 NOTE — ADDENDUM
[FreeTextEntry1] : This note was authored by Paul Garibay working as a medical scribe for Dr. Jaswinder Marina. The note was reviewed, edited, and revised by Dr. Jaswinder Marina whom is in agreement with the exam findings, imaging findings, and treatment plan. 10/22/2019.

## 2019-10-22 NOTE — CONSULT LETTER
[Dear  ___] : Dear  [unfilled], [Consult Letter:] : I had the pleasure of evaluating your patient, [unfilled]. [Please see my note below.] : Please see my note below. [Consult Closing:] : Thank you very much for allowing me to participate in the care of this patient.  If you have any questions, please do not hesitate to contact me. [Sincerely,] : Sincerely, [FreeTextEntry2] : ABRAN QUINN MD\par  [FreeTextEntry3] : Jaswinder Marina MD\par Chief of Joint Replacement\par [facility] Knee Replacement \par [facility] [place]\par \par

## 2019-10-22 NOTE — PHYSICAL EXAM
[de-identified] : The patient appears well nourished  and in no apparent distress.  The patient is alert and oriented to person, place, and time.   Affect and mood appear normal. The head is normocephalic and atraumatic.  The eyes reveal normal sclera and extra ocular muscles are intact. The tongue is midline with no apparent lesions.  Skin shows normal turgor with no evidence of eczema or psoriasis.  No respiratory distress noted.  Sensation grossly intact.\par   [de-identified] : Exam of the left hip shows hip flexion of 90 degrees with pain, hip external rotation of 20 degrees with less pain, internal rotation of 10 degrees with less pain.\par Exam of the right hip shows an antalgic gait while ambulating, hip flexion of 90 degrees with pain, external rotation of 20 degrees limited by pain, internal rotation of 10 degrees limited by pain. 5/5 motor strength bilaterally distally. Sensation intact distally. Difficulty lifting the right leg onto the exam table. Leg lengths are equal. [de-identified] : Xray- AP pelvis and 2 views right hip shows severe osteoarthritis of both hips with joint space narrowing and osteophyte formation.

## 2019-10-22 NOTE — HISTORY OF PRESENT ILLNESS
[de-identified] : The patient is a 64 year old male being seen for evaluation of his right hip. He reports a history of congenital hip dysplasia as a child. He underwent casting and bracing. He began to develop bilateral hip pain 15 years ago. He reports pain has progressed. Right hip pain is greater than left hip pain. Pain is constant. He has difficulty sleeping. Pain is worse with driving and at the end of the day of long activities. He reports difficulty donning/doffing shoes and socks and decreased range of motion. He feels at this point his quality of life is affected. He is tried ibuprofen and meloxicam with no relief. Physical therapy has offered no belief. He's taken oxycodone tramadol intermittently. He reports a 15 pound weight gain as he is now more sedentary. He presents today to discuss joint replacement surgery.

## 2019-10-25 ENCOUNTER — APPOINTMENT (OUTPATIENT)
Dept: FAMILY MEDICINE | Facility: CLINIC | Age: 64
End: 2019-10-25
Payer: MEDICAID

## 2019-10-25 VITALS
SYSTOLIC BLOOD PRESSURE: 124 MMHG | WEIGHT: 192 LBS | HEIGHT: 70 IN | BODY MASS INDEX: 27.49 KG/M2 | OXYGEN SATURATION: 99 % | DIASTOLIC BLOOD PRESSURE: 80 MMHG | HEART RATE: 76 BPM

## 2019-10-25 DIAGNOSIS — M25.569 PAIN IN UNSPECIFIED KNEE: ICD-10-CM

## 2019-10-25 PROCEDURE — 99213 OFFICE O/P EST LOW 20 MIN: CPT | Mod: 25

## 2019-10-25 PROCEDURE — 90686 IIV4 VACC NO PRSV 0.5 ML IM: CPT

## 2019-10-25 PROCEDURE — G0008: CPT

## 2019-10-25 NOTE — REVIEW OF SYSTEMS
[Headache] : headache [Dizziness] : dizziness [Negative] : Genitourinary [FreeTextEntry9] : as per cc [de-identified] : FRontal

## 2019-10-25 NOTE — HISTORY OF PRESENT ILLNESS
[FreeTextEntry1] : patient is here for medication refills on alprazolam 1 mg\par \par [facility]\par \par  [de-identified] : is thinking about getting a colonoscopy \par Will do after knee surgery in [place]

## 2019-11-12 ENCOUNTER — APPOINTMENT (OUTPATIENT)
Dept: PHYSICAL MEDICINE AND REHAB | Facility: CLINIC | Age: 64
End: 2019-11-12
Payer: MEDICAID

## 2019-11-12 VITALS
SYSTOLIC BLOOD PRESSURE: 190 MMHG | HEART RATE: 78 BPM | HEIGHT: 70 IN | DIASTOLIC BLOOD PRESSURE: 105 MMHG | WEIGHT: 192 LBS | BODY MASS INDEX: 27.49 KG/M2

## 2019-11-12 PROCEDURE — 99213 OFFICE O/P EST LOW 20 MIN: CPT

## 2019-11-12 NOTE — ASSESSMENT
[FreeTextEntry1] : Pt. w/ advanced b/l femoroacetabular OA.  X-rays previously reviewed w/ patient.  Scheduled for R NIKKO w/ Dr. Marina Jan 2020.   Will Rx low dose oxycodone 5 mg; 1 tab po bid prn # 30 to bridge patient until surgery.  Pt. will return to office every 2 weeks for new Rx's.  Discussed R/B/A to tx w/ narcotic analgesics including but not limited to respiratory depression, CNS depression, constipation, and untimely death if not used as directed.  Pt. instructed not to take w/ benzodiazepine.  I-STOP DATABASE CHECKED.  Pt. will sign opioid agreement.  Advised to c/w HEP.   Cautioned patient on chronic use of PO NSAIDS 2' GI/cardiac/renal toxicities.   Pt. deferred US-guided corticosteroid injection into right hip.  Pt. is in agreement with plan.  RTC 2 weeks.

## 2019-11-12 NOTE — PHYSICAL EXAM
[FreeTextEntry1] : NAD.  \par A&Ox3\par Overweight but not obese.\par No significant change in today's examination.\par PROM R hip - 25' IR w/ pain; 40' ER.  L hip 25-30' IR/ER.  \par MMT 5/5 B/L HF/KE/DF/PF.  \par DTR's 2+ knees/ankles.  \par SILT.  \par L-spine ROM - near-full forward flexion w/o pain.  Extension 10' w/ hip pain.

## 2019-11-12 NOTE — HISTORY OF PRESENT ILLNESS
[FreeTextEntry1] : 64 y.o. M returns to office for f/u b/l hip pain.  Since last visit, he reports feeling worsening of pain - pain is constant at 9/10.  Has been missing work 2/2 pain.  Had tried NSAIDS with minimal relief, only found relief with oxycodone in the past.  Pain would cause him to have GI discomfort.  \par \par Pain is shooting down right > left groin, and down medial thighs.  Heat worsens the pain, slightly relieved with cold compress. Difficultly with navigating up stairs.  Noted gait imbalance over the past few months - denies weakness but a disconnect with right hip where he fell twice.  \par \par Saw Dr. Arambula and has schedule R THR in January.  Of note, pt. has Rx for alprazolam 1 mg which he takes for anxiety and sleep.\par \par

## 2019-11-12 NOTE — REVIEW OF SYSTEMS
[Joint Pain] : joint pain [Recent Change In Weight] : ~T recent weight change [Joint Stiffness] : joint stiffness [Anxiety] : anxiety [Negative] : Heme/Lymph

## 2019-11-25 ENCOUNTER — APPOINTMENT (OUTPATIENT)
Dept: FAMILY MEDICINE | Facility: CLINIC | Age: 64
End: 2019-11-25
Payer: MEDICAID

## 2019-11-25 VITALS
TEMPERATURE: 98.1 F | DIASTOLIC BLOOD PRESSURE: 94 MMHG | BODY MASS INDEX: 26.92 KG/M2 | WEIGHT: 188 LBS | SYSTOLIC BLOOD PRESSURE: 188 MMHG | OXYGEN SATURATION: 97 % | HEART RATE: 72 BPM | HEIGHT: 70 IN

## 2019-11-25 DIAGNOSIS — R26.81 UNSTEADINESS ON FEET: ICD-10-CM

## 2019-11-25 PROCEDURE — 99214 OFFICE O/P EST MOD 30 MIN: CPT

## 2019-11-25 NOTE — REVIEW OF SYSTEMS
[Headache] : headache [Dizziness] : dizziness [de-identified] : FRontal [FreeTextEntry9] : as per cc [Negative] : Genitourinary

## 2019-11-25 NOTE — PHYSICAL EXAM
[Normal] : no respiratory distress, lungs were clear to auscultation bilaterally and no accessory muscle use [No Carotid Bruits] : no carotid bruits [Normal Affect] : the affect was normal [No Edema] : there was no peripheral edema [de-identified] : Limpimg sec to hip and knee pain [Alert and Oriented x3] : oriented to person, place, and time [Normal Insight/Judgement] : insight and judgment were intact

## 2019-11-25 NOTE — HISTORY OF PRESENT ILLNESS
[FreeTextEntry1] : male pt here for refill on xanax and losartan  [de-identified] : pt receiving pain meds from PM \isabelle aware of xanax\isabelle Plans to have THR in [place]

## 2019-11-26 ENCOUNTER — APPOINTMENT (OUTPATIENT)
Dept: PHYSICAL MEDICINE AND REHAB | Facility: CLINIC | Age: 64
End: 2019-11-26
Payer: MEDICAID

## 2019-11-26 VITALS
SYSTOLIC BLOOD PRESSURE: 185 MMHG | HEIGHT: 70 IN | BODY MASS INDEX: 26.92 KG/M2 | DIASTOLIC BLOOD PRESSURE: 108 MMHG | HEART RATE: 70 BPM | WEIGHT: 188 LBS

## 2019-11-26 PROCEDURE — 99213 OFFICE O/P EST LOW 20 MIN: CPT

## 2019-11-26 RX ORDER — IBUPROFEN 800 MG/1
800 TABLET, FILM COATED ORAL
Qty: 90 | Refills: 1 | Status: DISCONTINUED | COMMUNITY
Start: 2019-08-30 | End: 2019-11-26

## 2019-11-26 RX ORDER — CELECOXIB 200 MG/1
200 CAPSULE ORAL
Qty: 60 | Refills: 0 | Status: DISCONTINUED | COMMUNITY
Start: 2019-10-22 | End: 2019-11-26

## 2019-11-26 NOTE — HISTORY OF PRESENT ILLNESS
[FreeTextEntry1] : 64 y.o. M returns to office for f/u b/l hip pain.  Since last visit, he reports feeling worsening of pain.   He states that his pain is constant rated 9/10.  Has been missing work intermittently 2/2 pain.  Had tried NSAIDS with minimal relief.  He has only found relief with oxycodone in the past.   However, he reports that current dose of oxycodone 5 mg BID is not providing sufficient pain relief.  He discloses that he has been taking ~8-10 tabs of OTC Tylenol 500 mg (total of 7659-2623 mg total) over the last two weeks which causes some stomach discomfort.   Labs (7/2019) checked - normal LFTs. \par \par Pain is shooting down right > left groin, and down medial thighs.  Heat worsens the pain, slightly relieved with cold compress. Difficultly with navigating up stairs.  Noted gait imbalance over the past few months - denies weakness but a disconnect with right hip where he fell twice.  \par \par Saw Dr. Arambula and has schedule R THR in January.  Of note, pt. has Rx for alprazolam 1 mg which he takes for anxiety and sleep.\par \par

## 2019-11-26 NOTE — ASSESSMENT
[FreeTextEntry1] : 64 y.o. M w/ advanced b/l femoroacetabular OA.  X-rays previously reviewed w/ patient.  Scheduled for R NIKKO w/ Dr. Marina Jan 2020.  Pt. deferred US-guided corticosteroid injection into right hip.  I cautioned patient on chronic use of PO NSAIDS + acetaminophen 2' GI/cardiac/renal toxicities.  Based upon the progressive  nature of the patient's pain, we will increase his dose of oxycodone to 10 mg po bid prn # 30 to bridge patient until surgery.   I again discussed R/B/A to tx w/ narcotic analgesics including but not limited to respiratory depression, CNS depression, constipation, and untimely death if not used as directed.  Pt. was again instructed not to take narcotic analgesics w/ benzodiazepines.   Pt. will still need to return to office every 2 weeks for new Rx's.  I-STOP DATABASE CHECKED (Ref # [id number]).  Pt. already signed opioid agreement.   Will plan on performing spot urine check at next office visit.  Advised to c/w HEP.   \par \par

## 2019-12-10 ENCOUNTER — APPOINTMENT (OUTPATIENT)
Dept: PHYSICAL MEDICINE AND REHAB | Facility: CLINIC | Age: 64
End: 2019-12-10
Payer: MEDICAID

## 2019-12-10 VITALS
HEIGHT: 70 IN | WEIGHT: 188 LBS | SYSTOLIC BLOOD PRESSURE: 202 MMHG | DIASTOLIC BLOOD PRESSURE: 120 MMHG | BODY MASS INDEX: 26.92 KG/M2 | HEART RATE: 68 BPM

## 2019-12-10 PROCEDURE — 99213 OFFICE O/P EST LOW 20 MIN: CPT

## 2019-12-10 NOTE — HISTORY OF PRESENT ILLNESS
[FreeTextEntry1] : 64 y.o. M returns to office for f/u b/l hip pain.   Pt. states that his pain is constant rated 7-8/10.  Pt. is currently working with the pain but needs to take several rest breaks.  He is currently taking oxycodone 10 mg BID and is tolerating the medication well.   He has decreased his dose of Tylenol 500 mg over the last two weeks as directed.   Labs (7/2019) checked - normal LFTs.  Of note, pt. has Rx for alprazolam 1 mg which he takes for anxiety and sleep.\par \par \par \par \par \par

## 2019-12-10 NOTE — ASSESSMENT
[FreeTextEntry1] : 64 y.o. M w/ advanced b/l femoroacetabular OA.  X-rays previously reviewed w/ patient.  Scheduled for R NIKKO w/ Dr. Marina on [place] 15, 2020.  Pt. again deferred US-guided corticosteroid injection into right/left hip.  I cautioned patient on chronic use of PO NSAIDS + acetaminophen 2' GI/cardiac/renal toxicities.  We will hold his dose of oxycodone at 10 mg po bid prn # 30 to bridge patient until surgery.   I-STOP DATABASE CHECKED.  I again discussed R/B/A to tx w/ narcotic analgesics including but not limited to respiratory depression, CNS depression, constipation, and untimely death if not used as directed.  Pt. was again instructed not to take narcotic analgesics w/ benzodiazepines.   Pt. will still need to return to office every 2 weeks for new Rx's.   Pt. already signed opioid agreement.   Will send for Utox.  Pt. refused to go to ED for HTN urgency.  I spoke w/ PMD who said pt. should come to office today for assessment.  \par \par

## 2019-12-17 ENCOUNTER — RX RENEWAL (OUTPATIENT)
Age: 64
End: 2019-12-17

## 2019-12-20 ENCOUNTER — TRANSCRIPTION ENCOUNTER (OUTPATIENT)
Age: 64
End: 2019-12-20

## 2019-12-24 ENCOUNTER — APPOINTMENT (OUTPATIENT)
Dept: PHYSICAL MEDICINE AND REHAB | Facility: CLINIC | Age: 64
End: 2019-12-24
Payer: MEDICAID

## 2019-12-24 VITALS
WEIGHT: 188 LBS | SYSTOLIC BLOOD PRESSURE: 150 MMHG | DIASTOLIC BLOOD PRESSURE: 86 MMHG | HEIGHT: 70 IN | BODY MASS INDEX: 26.92 KG/M2

## 2019-12-24 PROCEDURE — 99213 OFFICE O/P EST LOW 20 MIN: CPT

## 2019-12-24 RX ORDER — ATORVASTATIN CALCIUM 80 MG/1
80 TABLET, FILM COATED ORAL
Refills: 0 | Status: ACTIVE | COMMUNITY

## 2019-12-24 RX ORDER — ASPIRIN 81 MG/1
81 TABLET ORAL
Refills: 0 | Status: ACTIVE | COMMUNITY

## 2019-12-24 RX ORDER — SACUBITRIL AND VALSARTAN 24; 26 MG/1; MG/1
24-26 TABLET, FILM COATED ORAL
Refills: 0 | Status: ACTIVE | COMMUNITY

## 2019-12-24 RX ORDER — TICAGRELOR 90 MG/1
90 TABLET ORAL
Refills: 0 | Status: ACTIVE | COMMUNITY

## 2019-12-24 RX ORDER — HYDROCHLOROTHIAZIDE 25 MG/1
25 TABLET ORAL
Qty: 30 | Refills: 0 | Status: DISCONTINUED | COMMUNITY
Start: 2019-05-02 | End: 2019-12-24

## 2019-12-24 RX ORDER — LOSARTAN POTASSIUM 50 MG/1
50 TABLET, FILM COATED ORAL
Qty: 90 | Refills: 1 | Status: DISCONTINUED | COMMUNITY
Start: 2019-08-30 | End: 2019-12-24

## 2019-12-24 NOTE — REVIEW OF SYSTEMS
[Joint Pain] : joint pain [Chest Pain] : no chest pain [Palpitations] : no palpitations [Shortness Of Breath] : no shortness of breath [FreeTextEntry5] : DENIES [FreeTextEntry6] : DENIES [FreeTextEntry9] : JOINT PAIN

## 2019-12-24 NOTE — ASSESSMENT
[FreeTextEntry1] : 64 y.o. M w/ advanced b/l femoroacetabular OA now w/ new onset HF w/ low EF s/p PCI, stent x2 and portable defibrillator.  I explained to patient that NSAIDs are absolutely contraindicated given his new cardiac history.  Pt. understands that his scheduled R NIKKO w/ Dr. Marina for [place] 15, 2020 will need to be postponed.  We will hold his dose of oxycodone at 10 mg po bid prn # 30 but may adjust dose accordingly.  I-STOP DATABASE CHECKED.  I again discussed R/B/A to tx w/ narcotic analgesics including but not limited to respiratory depression, CNS depression, constipation, and untimely death if not used as directed.  Pt. was again instructed not to take narcotic analgesics w/ benzodiazepines.   Pt. will still need to return to office every 2 weeks for new Rx's.   \par \par

## 2020-01-02 ENCOUNTER — TRANSCRIPTION ENCOUNTER (OUTPATIENT)
Age: 65
End: 2020-01-02

## 2020-01-07 ENCOUNTER — APPOINTMENT (OUTPATIENT)
Dept: PHYSICAL MEDICINE AND REHAB | Facility: CLINIC | Age: 65
End: 2020-01-07
Payer: MEDICAID

## 2020-01-07 VITALS
HEIGHT: 70 IN | BODY MASS INDEX: 26.92 KG/M2 | DIASTOLIC BLOOD PRESSURE: 89 MMHG | HEART RATE: 55 BPM | WEIGHT: 188 LBS | SYSTOLIC BLOOD PRESSURE: 147 MMHG

## 2020-01-07 PROCEDURE — 99213 OFFICE O/P EST LOW 20 MIN: CPT

## 2020-01-07 NOTE — ASSESSMENT
[FreeTextEntry1] : 64 y.o. M w/ advanced b/l femoroacetabular OA now w/ new onset HF w/ low EF s/p PCI, stent x2 and portable defibrillator.  I again explained to patient that NSAIDs are absolutely contraindicated given his new cardiac history.   We will hold his dose of oxycodone at 10 mg po bid prn # 30 and will add on oxycodone 5 mg (#15) for breakthrough pain.   I-STOP DATABASE CHECKED.  I again discussed R/B/A to tx w/ narcotic analgesics including but not limited to respiratory depression, CNS depression, constipation, and untimely death if not used as directed.  Pt. was again instructed not to take narcotic analgesics w/ benzodiazepines.   Pt. will still need to return to office every 2 weeks for new Rx's.   \par \par

## 2020-01-07 NOTE — HISTORY OF PRESENT ILLNESS
[FreeTextEntry1] : 64 y.o. M w/ advanced b/l femoroacetabular OA now w/ new onset HF w/ low EF s/p PCI, stent x2 and portable defibrillator returns to office for f/u and med renewal.  Pt. c/o intermittent HAMPTON - been walking leisurely -was able to work for 4 hours yesterday without difficulty.  Pt. continues on oxycodone 10 mg po bid and tolerates well.

## 2020-01-15 ENCOUNTER — APPOINTMENT (OUTPATIENT)
Dept: ORTHOPEDIC SURGERY | Facility: HOSPITAL | Age: 65
End: 2020-01-15

## 2020-01-21 ENCOUNTER — APPOINTMENT (OUTPATIENT)
Dept: PHYSICAL MEDICINE AND REHAB | Facility: CLINIC | Age: 65
End: 2020-01-21
Payer: MEDICAID

## 2020-01-21 ENCOUNTER — APPOINTMENT (OUTPATIENT)
Dept: FAMILY MEDICINE | Facility: CLINIC | Age: 65
End: 2020-01-21
Payer: MEDICAID

## 2020-01-21 VITALS
DIASTOLIC BLOOD PRESSURE: 74 MMHG | WEIGHT: 190 LBS | SYSTOLIC BLOOD PRESSURE: 120 MMHG | HEIGHT: 70 IN | BODY MASS INDEX: 27.2 KG/M2

## 2020-01-21 VITALS
BODY MASS INDEX: 26.92 KG/M2 | WEIGHT: 188 LBS | SYSTOLIC BLOOD PRESSURE: 104 MMHG | HEART RATE: 79 BPM | DIASTOLIC BLOOD PRESSURE: 69 MMHG | HEIGHT: 70 IN

## 2020-01-21 PROCEDURE — 99212 OFFICE O/P EST SF 10 MIN: CPT

## 2020-01-21 PROCEDURE — 99214 OFFICE O/P EST MOD 30 MIN: CPT

## 2020-01-21 RX ORDER — ALPRAZOLAM 1 MG/1
1 TABLET ORAL
Qty: 90 | Refills: 0 | Status: DISCONTINUED | COMMUNITY
Start: 2018-05-15 | End: 2020-01-21

## 2020-01-21 NOTE — PHYSICAL EXAM
[FreeTextEntry1] : NAD.  \par A&Ox3\par Overweight but not obese.\par No significant change in today's examination.\par PROM R hip - 35' IR w/ pain; 40' ER.  L hip 20' IR/ER.  \par MMT 5/5 B/L HF/KE/DF/PF.  \par DTR's 2+ knees/ankles.  \par SILT.  \par L-spine ROM - near-full forward flexion w/o pain.  Extension 10' w/ hip pain.

## 2020-01-21 NOTE — ASSESSMENT
[FreeTextEntry1] : 64 y.o. M w/ advanced b/l femoroacetabular OA now w/ new onset HF w/ low EF s/p PCI, stent x2 and portable defibrillator.  I again explained to patient that NSAIDs are absolutely contraindicated given his new cardiac history.   We will hold his dose of oxycodone at 10 mg po bid prn # 30 and will add on oxycodone 5 mg (#15) for breakthrough pain.   I-STOP DATABASE CHECKED.  I again discussed R/B/A to tx w/ narcotic analgesics including but not limited to respiratory depression, CNS depression, constipation, and untimely death if not used as directed.  Pt. was again instructed not to take narcotic analgesics w/ benzodiazepines and will start wean soon.   Pt. will still need to return to office every 2 weeks for new Rx's.  Will send pt. for Utox today.  \par \par

## 2020-01-21 NOTE — HISTORY OF PRESENT ILLNESS
[FreeTextEntry1] : 64 y.o. M w/ advanced b/l femoroacetabular OA now w/ new onset HF w/ low EF s/p PCI, stent x2 and portable defibrillator returns to office for f/u.  Pt. is c/o HAMPTON.  Has f/u appt. w/ cardiology tomorrow at [place].  May be able to start cardiac rehab soon.   Pt. states that he is taking all of his HF medications.   Hip pain is controlled on current pain regimen of oxycodone 10 mg po bid which he tolerates well.  The breakthrough dose of oxycodone 5 mg is helpful.  Pt. is able to work part-time.  Of note, pt. states that he will begin weaning off his benzodiazepine w/ PMD.

## 2020-01-23 NOTE — PHYSICAL EXAM
[Normal] : normal rate, regular rhythm, normal S1 and S2 and no murmur heard [No Carotid Bruits] : no carotid bruits [Alert and Oriented x3] : oriented to person, place, and time [No Edema] : there was no peripheral edema

## 2020-01-23 NOTE — REVIEW OF SYSTEMS
[Headache] : headache [Dizziness] : dizziness [Anxiety] : anxiety [Negative] : Genitourinary [FreeTextEntry9] : as per cc [de-identified] : FRontal

## 2020-01-23 NOTE — HISTORY OF PRESENT ILLNESS
[FreeTextEntry1] : patient is here for medication refills on alprazolam 1 mg \par \par [facility] [de-identified] : Pt had episode of with drawal when he ran out of meds and went to ER. BP was elevated. \par Also seeing pain management\par Pt was advised multiple time to reduce xanax and not take with pain meds

## 2020-01-24 ENCOUNTER — LABORATORY RESULT (OUTPATIENT)
Age: 65
End: 2020-01-24

## 2020-02-01 LAB
AMPHET UR-MCNC: NEGATIVE
BARBITURATES UR-MCNC: NEGATIVE
BENZODIAZ UR-MCNC: NORMAL
COCAINE METAB.OTHER UR-MCNC: NEGATIVE
CREATININE, URINE: 186.9 MG/DL
METHADONE UR-MCNC: NEGATIVE
METHAQUALONE UR-MCNC: NEGATIVE
OPIATES UR-MCNC: NEGATIVE
PCP UR-MCNC: NEGATIVE
PROPOXYPH UR QL: NEGATIVE
THC UR QL: NEGATIVE

## 2020-02-03 LAB
OXYCODONE, URINE: 5580 NG/ML
OXYMORPHONE, URINE: 3938 NG/ML

## 2020-02-04 ENCOUNTER — APPOINTMENT (OUTPATIENT)
Dept: PHYSICAL MEDICINE AND REHAB | Facility: CLINIC | Age: 65
End: 2020-02-04
Payer: MEDICAID

## 2020-02-04 ENCOUNTER — APPOINTMENT (OUTPATIENT)
Dept: ORTHOPEDIC SURGERY | Facility: CLINIC | Age: 65
End: 2020-02-04

## 2020-02-04 VITALS
BODY MASS INDEX: 27.2 KG/M2 | HEART RATE: 60 BPM | HEIGHT: 70 IN | SYSTOLIC BLOOD PRESSURE: 147 MMHG | WEIGHT: 190 LBS | DIASTOLIC BLOOD PRESSURE: 95 MMHG

## 2020-02-04 PROCEDURE — 99213 OFFICE O/P EST LOW 20 MIN: CPT

## 2020-02-04 NOTE — HISTORY OF PRESENT ILLNESS
[FreeTextEntry1] : 64 y.o. M w/ advanced b/l femoroacetabular OA now w/ new onset HF w/ low EF s/p PCI, stent x2 and portable defibrillator returns to office for f/u.  Urine tox reviewed.  + opiate and + benzo (previously Rx'd by PMD for anxiety).  Pt. reports that his right hip has been bothering him more recently.  He continues on his regimen of oxycodone 10 mg po bid + 5 mg for breakthrough pain which he tolerates well.  He is compliant with his HF meds.  Still anticoagulated on ASA + Brilinta.   He states that he is working about 4-5 hours/day.

## 2020-02-04 NOTE — ASSESSMENT
[FreeTextEntry1] : 64 y.o. M w/ advanced b/l femoroacetabular OA now w/ new onset HF w/ low EF s/p PCI, stent x2 and portable defibrillator.  I again explained to patient that NSAIDs are absolutely contraindicated given his new cardiac history.   We will hold his dose of oxycodone at 10 mg po bid prn # 30 and c/w oxycodone 5 mg (#15) for breakthrough pain prn.  I-STOP DATABASE CHECKED.  I again discussed R/B/A to tx w/ narcotic analgesics including but not limited to respiratory depression, CNS depression, N/V/C, physiological and psychological addiction and untimely death if not used as directed.  Pt. was again instructed not to take narcotic analgesics w/ benzodiazepines.  Per the pt's PMD, he will begin benzodiazepine wean over next few months.  Pt. will still need to return to office every 2 weeks for new narcotic Rx's.  \par

## 2020-02-18 ENCOUNTER — APPOINTMENT (OUTPATIENT)
Dept: FAMILY MEDICINE | Facility: CLINIC | Age: 65
End: 2020-02-18
Payer: MEDICAID

## 2020-02-18 VITALS
HEIGHT: 70 IN | BODY MASS INDEX: 26.63 KG/M2 | TEMPERATURE: 98.7 F | WEIGHT: 186 LBS | OXYGEN SATURATION: 97 % | DIASTOLIC BLOOD PRESSURE: 80 MMHG | HEART RATE: 69 BPM | SYSTOLIC BLOOD PRESSURE: 138 MMHG

## 2020-02-18 PROCEDURE — 99213 OFFICE O/P EST LOW 20 MIN: CPT

## 2020-02-20 NOTE — REVIEW OF SYSTEMS
[Headache] : headache [Dizziness] : dizziness [Anxiety] : anxiety [Negative] : Genitourinary [de-identified] : FRontal [FreeTextEntry9] : as per cc

## 2020-02-20 NOTE — HISTORY OF PRESENT ILLNESS
[de-identified] : Pt had episode of with drawal when he ran out of meds and went to ER. BP was elevated. \par Also seeing pain management\par Pt was advised multiple time to reduce xanax and not take with pain meds [FreeTextEntry1] : Patient is here for medication renewal alprazolam

## 2020-02-20 NOTE — PHYSICAL EXAM
[Normal] : normal rate, regular rhythm, normal S1 and S2 and no murmur heard [No Edema] : there was no peripheral edema [Alert and Oriented x3] : oriented to person, place, and time [No Carotid Bruits] : no carotid bruits

## 2020-02-21 ENCOUNTER — APPOINTMENT (OUTPATIENT)
Dept: PHYSICAL MEDICINE AND REHAB | Facility: CLINIC | Age: 65
End: 2020-02-21
Payer: MEDICAID

## 2020-02-21 VITALS
HEIGHT: 70 IN | DIASTOLIC BLOOD PRESSURE: 87 MMHG | SYSTOLIC BLOOD PRESSURE: 148 MMHG | BODY MASS INDEX: 26.63 KG/M2 | WEIGHT: 186 LBS

## 2020-02-21 PROCEDURE — 99214 OFFICE O/P EST MOD 30 MIN: CPT

## 2020-02-21 RX ORDER — SPIRONOLACTONE 25 MG/1
25 TABLET ORAL
Refills: 0 | Status: DISCONTINUED | COMMUNITY
End: 2020-02-21

## 2020-02-21 RX ORDER — CARVEDILOL 6.25 MG/1
6.25 TABLET, FILM COATED ORAL
Refills: 0 | Status: ACTIVE | COMMUNITY

## 2020-02-21 RX ORDER — CARVEDILOL 12.5 MG/1
12.5 TABLET, FILM COATED ORAL
Refills: 0 | Status: DISCONTINUED | COMMUNITY
End: 2020-02-21

## 2020-02-21 NOTE — PHYSICAL EXAM
[FreeTextEntry1] : General: NAD, alert\par Psych: normal mood and affect\par HEENT: NC/AT, normal visual tracking\par Pulmonary: no resp distress, chest expansion appears symmetrical\par CV: extremities are warm and perfused\par Abd: non-distended\par Ext: no c/c/e\par normal skin color and appearance\par \par Lumbar/Hip Spine:\par Gait - non-antalgic, able to heel and toe walk\par Inspection: normal muscle bulk without asymmetry\par Tenderness to palpation: TTP over bilateral greater trochanter\par PROM R hip - 35' IR w/ pain; 40' ER.  L hip 20' IR/ER.  \par MMT: 5/5 bilateral lower extremities (HF, KE, KF, DF, PF, EHL)\par Reflexes: symmetric bilateral achilles and patella \par Sensory: intact to light touch in all dermatomes of the bilateral lower extremities\par Provocative testing:\par SLR - negative\par SEAN - negative \par FADIR - positive bilaterally\par

## 2020-02-21 NOTE — HISTORY OF PRESENT ILLNESS
[FreeTextEntry1] : 64 y.o. M w/ advanced b/l femoroacetabular OA.  Given new onset of MI / HF w/ low EF s/p PCI, stent x2 and portable defibrillator R THR has estuardo postponed.  He's been on opioids for long term - currently on oxycodone 10 BID and 5mg for breakthrough pain QD PRN.  He's also on benzo for anxiety - xanax recently dec from 1mg to 0.5mg BID PRN.  Noted that since dose adjustment he's experiencing tinnitus, jitter, mild headache, and mild elevation of BP.  He's currently only on coreg 6.25mg for heart failure (dec from 12.5 since Jan 2/2 hypotension), ASA and Brilinta. He returned to work with modified hours - denies leg swelling, SOB/HAMPTON, weakness, paresthesia, B/B incontinence, or saddle anesthesia.

## 2020-02-21 NOTE — ASSESSMENT
[FreeTextEntry1] : 64 y.o. M w/ advanced b/l femoroacetabular OA now w/ new onset HF w/ low EF s/p PCI, stent x2 and portable defibrillator.  Cautioned that NSAIDs are absolutely contraindicated with new cardiac history and blood thinner medications.  Pain currently managed on oxycodone 10 mg po bid prn and oxycodone 5 mg QD PRN for breakthrough pain.  He was advised not to take opioid and benzo d/t risk of over sedation - xanax dec with fluctuant of symptoms.  Had discussed nonpharm  modalities for anxiety - such as meditation, exercises/yoga. No lower extremity edema present - but if developed was told to f/u with card.  Continue with pain regimen - return to office in 2 weeks for narcotic management\par

## 2020-03-02 ENCOUNTER — APPOINTMENT (OUTPATIENT)
Dept: ORTHOPEDIC SURGERY | Facility: CLINIC | Age: 65
End: 2020-03-02

## 2020-03-03 ENCOUNTER — APPOINTMENT (OUTPATIENT)
Dept: PHYSICAL MEDICINE AND REHAB | Facility: CLINIC | Age: 65
End: 2020-03-03
Payer: MEDICAID

## 2020-03-03 VITALS
DIASTOLIC BLOOD PRESSURE: 88 MMHG | SYSTOLIC BLOOD PRESSURE: 143 MMHG | WEIGHT: 186 LBS | HEIGHT: 70 IN | BODY MASS INDEX: 26.63 KG/M2

## 2020-03-03 PROCEDURE — 99212 OFFICE O/P EST SF 10 MIN: CPT

## 2020-03-03 NOTE — HISTORY OF PRESENT ILLNESS
[FreeTextEntry1] : 64 y.o. M w/ advanced b/l femoroacetabular OA now w/ new onset HF w/ low EF s/p PCI, stent x2 and life vest returns to office for f/u.  Pt's hip pain is static and non-progressive.  Left hip bothers him more at night while the right hip bothers him more during the day at work.  Pt. is maintained on oxycodone 10 mg po bid + oxycodone 5 mg for breakthrough pain.  Pt. states that he has cut his dose of alprazolam in half (0.5 mg) tid prn.  Of note, pt. will see cardiology MD soon and discuss permanent defibrillator implantation.

## 2020-03-03 NOTE — DATA REVIEWED
[FreeTextEntry1] : X-rays pelvis and b/l hips reviewed w/ patient: c/w advanced L > R femoroactebular OA w/ decreased joint space and osteophytes.  Gun stock deformities.  Adequate coverage femoral head - no evidence of acetabular dysplasia.

## 2020-03-03 NOTE — ASSESSMENT
[FreeTextEntry1] : 64 y.o. M w/ advanced b/l femoroacetabular OA now w/ new onset HF w/ low EF s/p PCI, stent x2 and life vest pending permanent defibrillator implantation.  I again explained to patient that NSAIDs are absolutely contraindicated given his new cardiac history.   We will hold his dose of oxycodone at 10 mg po bid prn # 30 and c/w oxycodone 5 mg (#15) for breakthrough pain prn.  I-STOP DATABASE CHECKED.  I again discussed R/B/A to tx w/ narcotic analgesics including but not limited to respiratory depression, CNS depression, N/V/C, physiological and psychological addiction and untimely death if not used as directed.  Pt. was again instructed not to take narcotic analgesics w/ benzodiazepines.  Per the pt's PMD, he will begin benzodiazepine wean over next few months.  Pt. will still need to return to office every 2 weeks for new narcotic Rx's.  \par

## 2020-03-09 ENCOUNTER — APPOINTMENT (OUTPATIENT)
Dept: FAMILY MEDICINE | Facility: CLINIC | Age: 65
End: 2020-03-09
Payer: MEDICAID

## 2020-03-09 VITALS
WEIGHT: 185 LBS | DIASTOLIC BLOOD PRESSURE: 92 MMHG | BODY MASS INDEX: 26.48 KG/M2 | HEIGHT: 70 IN | SYSTOLIC BLOOD PRESSURE: 170 MMHG

## 2020-03-09 VITALS — SYSTOLIC BLOOD PRESSURE: 142 MMHG | DIASTOLIC BLOOD PRESSURE: 86 MMHG

## 2020-03-09 PROCEDURE — 99214 OFFICE O/P EST MOD 30 MIN: CPT

## 2020-03-09 NOTE — HISTORY OF PRESENT ILLNESS
[FreeTextEntry1] : patient is here for medication refills on alprazolam 0.5 mg\par \par [facility] [de-identified] : pt wife has svere colitis and was in hospital for 5 days.\par Pt gave some xanax to his wife and has run out early

## 2020-03-09 NOTE — REVIEW OF SYSTEMS
[Headache] : headache [Dizziness] : dizziness [Anxiety] : anxiety [Negative] : Genitourinary [FreeTextEntry9] : as per cc [de-identified] : FRontal

## 2020-03-09 NOTE — PLAN
[FreeTextEntry1] : Advised to not give meds to his wife.\par [place]

## 2020-03-09 NOTE — ASSESSMENT
[FreeTextEntry1] : Pt was seeing cardio in [place] but would prefer to see a [facility] Cardiologist, in [facility]

## 2020-03-17 ENCOUNTER — APPOINTMENT (OUTPATIENT)
Dept: PHYSICAL MEDICINE AND REHAB | Facility: CLINIC | Age: 65
End: 2020-03-17
Payer: MEDICAID

## 2020-03-17 VITALS
RESPIRATION RATE: 20 BRPM | OXYGEN SATURATION: 99 % | HEART RATE: 71 BPM | SYSTOLIC BLOOD PRESSURE: 146 MMHG | DIASTOLIC BLOOD PRESSURE: 80 MMHG

## 2020-03-17 PROCEDURE — 99213 OFFICE O/P EST LOW 20 MIN: CPT

## 2020-03-17 NOTE — HISTORY OF PRESENT ILLNESS
[FreeTextEntry1] : 64 y.o. M w/ advanced b/l femoroacetabular OA now w/ new onset HF w/ low EF s/p PCI, stent x2 and life vest pending permanent defibrillator implantation returns to office for f/u.  Pt. states that his cardiology appt. has been postponed 2' to COVID-19 outbreak.   He is still anticoagulated on Brilinta and ASA.   Hip pain is static and non-progressive.  NIKKO is also still pending.  Pt. is tolerating pain medications well without significant AEs.   Continues to wean down his alprazolam (now 0.5 mg tid down from 1.0 mg tid).  Pt. is able to walk w/o limp but has difficulty pivoting and turning 2' pain.

## 2020-03-17 NOTE — ASSESSMENT
[FreeTextEntry1] : 64 y.o. M w/ advanced b/l femoroacetabular OA now w/ new onset HF w/ low EF s/p PCI, stent x2 and life vest pending permanent defibrillator implantation.  I again explained to patient that NSAIDs are absolutely contraindicated given his new cardiac history.   We will continue to hold his dose of oxycodone at 10 mg po bid prn # 30 and c/w oxycodone 5 mg (#15) for breakthrough pain prn.  I-STOP DATABASE CHECKED.  I again discussed R/B/A to tx w/ narcotic analgesics including but not limited to respiratory depression, CNS depression, N/V/C, physiological and psychological addiction and untimely death if not used as directed.  Pt. was again instructed not to take narcotic analgesics w/ benzodiazepines.  Per the pt's PMD, he will continue his benzodiazepine wean over next several months.  Pt. will still need to return to office every 2 weeks for new narcotic Rx's.  May consider [facility] hip CSI if pt. is not able to schedule NIKKO within reasonable period of time 2' COVID-19 outbreak.  \par

## 2020-03-30 NOTE — PLAN
[FreeTextEntry1] : advised not to take oxycodone,\par Ibuprofen was sent in.but taking oxy\par Meloxicam wasn’t working\par Needs rt hip replacement scheduled jan\par \par Advised not to take xanax while taking and pain meds\par \par ISTOP and PM&R reviewed Yes

## 2020-04-16 ENCOUNTER — APPOINTMENT (OUTPATIENT)
Dept: PHYSICAL MEDICINE AND REHAB | Facility: CLINIC | Age: 65
End: 2020-04-16

## 2020-04-28 ENCOUNTER — APPOINTMENT (OUTPATIENT)
Dept: PHYSICAL MEDICINE AND REHAB | Facility: CLINIC | Age: 65
End: 2020-04-28
Payer: MEDICAID

## 2020-04-28 VITALS
HEIGHT: 70 IN | BODY MASS INDEX: 26.48 KG/M2 | WEIGHT: 185 LBS | DIASTOLIC BLOOD PRESSURE: 108 MMHG | HEART RATE: 63 BPM | SYSTOLIC BLOOD PRESSURE: 170 MMHG | TEMPERATURE: 98.3 F

## 2020-04-28 PROCEDURE — 99213 OFFICE O/P EST LOW 20 MIN: CPT

## 2020-04-28 NOTE — ASSESSMENT
[FreeTextEntry1] : 65 y.o. M w/ advanced b/l femoroacetabular OA now w/ relatively onset HF w/ low EF s/p PCI and stent x2 doing well.  I again explained to patient that NSAIDs are absolutely contraindicated given his cardiac history.   We will continue to hold his dose of oxycodone at 10 mg po bid prn # 30 and c/w oxycodone 5 mg (#15) for breakthrough pain prn.  I-STOP DATABASE CHECKED.  I again discussed R/B/A to tx w/ narcotic analgesics including but not limited to respiratory depression, CNS depression, N/V/C, physiological and psychological addiction and untimely death if not used as directed.  Pt. was again instructed not to take narcotic analgesics w/ benzodiazepines.  Per the pt's PMD, he will continue his benzodiazepine wean over next several months.  Given the ongoing COVID-19 pandemic and the patient's wife's chronic medical conditions (severe IBS for which he is the primary caregiver), he may follow up in office every month, but Rx's will be dispensed every 2 weeks.   Pt. will investigate when he can schedule his NIKKO w/ Dr. Marina.

## 2020-04-28 NOTE — HISTORY OF PRESENT ILLNESS
[FreeTextEntry1] : 65 y.o. M w/ advanced b/l femoroacetabular OA w/ relatively new onset HF w/ low EF s/p PCI and stent x2 returns to clinic for f/u.  Pt. states that life vest was discontinued about 2-3 weeks ago.  He also states that he may not need PPM/defibrillator.  Pt. denies CP/SOB/palpitations.  He denies flu-like sxs and was not tested for COVID-19.  He is maintained on oxycodone at 10 mg po bid and oxycodone 5 mg for breakthrough pain prn which he tolerates well.  Denies N/V/D/C.

## 2020-05-22 ENCOUNTER — APPOINTMENT (OUTPATIENT)
Dept: FAMILY MEDICINE | Facility: CLINIC | Age: 65
End: 2020-05-22
Payer: MEDICAID

## 2020-05-22 VITALS
HEIGHT: 70 IN | BODY MASS INDEX: 27.2 KG/M2 | WEIGHT: 190 LBS | OXYGEN SATURATION: 99 % | DIASTOLIC BLOOD PRESSURE: 88 MMHG | TEMPERATURE: 97.7 F | HEART RATE: 86 BPM | SYSTOLIC BLOOD PRESSURE: 142 MMHG

## 2020-05-22 DIAGNOSIS — Z11.59 ENCOUNTER FOR SCREENING FOR OTHER VIRAL DISEASES: ICD-10-CM

## 2020-05-22 PROCEDURE — 36415 COLL VENOUS BLD VENIPUNCTURE: CPT

## 2020-05-22 PROCEDURE — 99214 OFFICE O/P EST MOD 30 MIN: CPT | Mod: 25

## 2020-05-24 NOTE — HISTORY OF PRESENT ILLNESS
[FreeTextEntry1] : patient is here for medication refills on alprazolam 0.5 mg\par \par [facility]  [de-identified] : has not been using xanax but since pandemic has been unable to sleep

## 2020-05-24 NOTE — PHYSICAL EXAM
[Normal] : normal rate, regular rhythm, normal S1 and S2 and no murmur heard [No Carotid Bruits] : no carotid bruits [No Edema] : there was no peripheral edema [Coordination Grossly Intact] : coordination grossly intact [Normal Gait] : normal gait [Normal Affect] : the affect was normal [Alert and Oriented x3] : oriented to person, place, and time [Normal Insight/Judgement] : insight and judgment were intact

## 2020-05-26 ENCOUNTER — APPOINTMENT (OUTPATIENT)
Dept: PHYSICAL MEDICINE AND REHAB | Facility: CLINIC | Age: 65
End: 2020-05-26
Payer: MEDICAID

## 2020-05-26 VITALS
SYSTOLIC BLOOD PRESSURE: 136 MMHG | TEMPERATURE: 97.9 F | HEIGHT: 70 IN | WEIGHT: 190 LBS | BODY MASS INDEX: 27.2 KG/M2 | DIASTOLIC BLOOD PRESSURE: 88 MMHG

## 2020-05-26 PROCEDURE — 99212 OFFICE O/P EST SF 10 MIN: CPT

## 2020-05-26 NOTE — ASSESSMENT
[FreeTextEntry1] : 65 y.o. M w/ advanced b/l femoroacetabular OA w/ relatively new onset HF (stable) w/ low EF s/p PCI and stent x2 doing well.  I again explained to patient that NSAIDs are absolutely contraindicated given his cardiac history.   We will continue to hold his dose of oxycodone at 10 mg po bid prn # 30 and c/w oxycodone 5 mg (#15) for breakthrough pain prn.  I-STOP DATABASE CHECKED.  I again discussed R/B/A to tx w/ narcotic analgesics including but not limited to respiratory depression, CNS depression, N/V/C, physiological and psychological addiction and untimely death if not used as directed.  Pt. was again instructed not to take narcotic analgesics w/ benzodiazepines.  Given the ongoing COVID-19 pandemic and the patient's wife's chronic medical conditions (severe IBS for which he is the primary caregiver), he may follow up in office once per month, instead of q 2 weeks.  His Rx's will be dispensed every 2 weeks.   Pt. will investigate when he can schedule his NIKKO w/ Dr. Marina (he is thinking end of summer).

## 2020-05-26 NOTE — HISTORY OF PRESENT ILLNESS
[FreeTextEntry1] : 65 y.o. M w/ advanced b/l femoroacetabular OA now w/ relatively onset HF w/ low EF s/p PCI and stent x2 doing well returns to office for f/u and medication renewal.  Pt. is maintained on oxycodone 10 mg po bid + oxycodone 5 mg po qd prn for breakthrough pain. He tolerates his medications well w/o AEs.  Pt. has weaned down his benzodiazepine use to qhs prn.

## 2020-06-02 ENCOUNTER — APPOINTMENT (OUTPATIENT)
Dept: PHYSICAL MEDICINE AND REHAB | Facility: CLINIC | Age: 65
End: 2020-06-02

## 2020-06-08 LAB
ALBUMIN SERPL ELPH-MCNC: 4.9 G/DL
ALP BLD-CCNC: 67 U/L
ALT SERPL-CCNC: 29 U/L
ANION GAP SERPL CALC-SCNC: 24 MMOL/L
AST SERPL-CCNC: 21 U/L
BASOPHILS # BLD AUTO: 0.07 K/UL
BASOPHILS NFR BLD AUTO: 0.8 %
BILIRUB SERPL-MCNC: 0.2 MG/DL
BUN SERPL-MCNC: 28 MG/DL
CALCIUM SERPL-MCNC: 9.4 MG/DL
CHLORIDE SERPL-SCNC: 105 MMOL/L
CHOLEST SERPL-MCNC: 229 MG/DL
CHOLEST/HDLC SERPL: 3.7 RATIO
CO2 SERPL-SCNC: 18 MMOL/L
CREAT SERPL-MCNC: 1.26 MG/DL
EOSINOPHIL # BLD AUTO: 0.13 K/UL
EOSINOPHIL NFR BLD AUTO: 1.5 %
ESTIMATED AVERAGE GLUCOSE: 126 MG/DL
GLUCOSE SERPL-MCNC: 99 MG/DL
HBA1C MFR BLD HPLC: 6 %
HCT VFR BLD CALC: 44.4 %
HDLC SERPL-MCNC: 62 MG/DL
HGB BLD-MCNC: 13.8 G/DL
IMM GRANULOCYTES NFR BLD AUTO: 0.6 %
LDLC SERPL CALC-MCNC: 133 MG/DL
LYMPHOCYTES # BLD AUTO: 1.59 K/UL
LYMPHOCYTES NFR BLD AUTO: 18.6 %
MAN DIFF?: NORMAL
MCHC RBC-ENTMCNC: 31.1 GM/DL
MCHC RBC-ENTMCNC: 32.5 PG
MCV RBC AUTO: 104.5 FL
MONOCYTES # BLD AUTO: 0.83 K/UL
MONOCYTES NFR BLD AUTO: 9.7 %
NEUTROPHILS # BLD AUTO: 5.89 K/UL
NEUTROPHILS NFR BLD AUTO: 68.8 %
PLATELET # BLD AUTO: 193 K/UL
POTASSIUM SERPL-SCNC: 4.2 MMOL/L
PROT SERPL-MCNC: 7.1 G/DL
PSA SERPL-MCNC: 3.37 NG/ML
RBC # BLD: 4.25 M/UL
RBC # FLD: 14.2 %
SARS-COV-2 IGG SERPL IA-ACNC: 0.1 INDEX
SARS-COV-2 IGG SERPL QL IA: NEGATIVE
SODIUM SERPL-SCNC: 147 MMOL/L
TRIGL SERPL-MCNC: 174 MG/DL
WBC # FLD AUTO: 8.56 K/UL

## 2020-06-23 ENCOUNTER — APPOINTMENT (OUTPATIENT)
Dept: FAMILY MEDICINE | Facility: CLINIC | Age: 65
End: 2020-06-23
Payer: MEDICAID

## 2020-06-23 ENCOUNTER — APPOINTMENT (OUTPATIENT)
Dept: PHYSICAL MEDICINE AND REHAB | Facility: CLINIC | Age: 65
End: 2020-06-23
Payer: MEDICAID

## 2020-06-23 VITALS
TEMPERATURE: 98.4 F | DIASTOLIC BLOOD PRESSURE: 84 MMHG | HEIGHT: 70 IN | BODY MASS INDEX: 29.2 KG/M2 | WEIGHT: 204 LBS | SYSTOLIC BLOOD PRESSURE: 130 MMHG

## 2020-06-23 VITALS
OXYGEN SATURATION: 99 % | HEIGHT: 70 IN | HEART RATE: 77 BPM | DIASTOLIC BLOOD PRESSURE: 84 MMHG | BODY MASS INDEX: 29.2 KG/M2 | TEMPERATURE: 98.2 F | WEIGHT: 204 LBS | SYSTOLIC BLOOD PRESSURE: 130 MMHG

## 2020-06-23 DIAGNOSIS — M72.0 PALMAR FASCIAL FIBROMATOSIS [DUPUYTREN]: ICD-10-CM

## 2020-06-23 PROCEDURE — 99212 OFFICE O/P EST SF 10 MIN: CPT

## 2020-06-23 PROCEDURE — 99213 OFFICE O/P EST LOW 20 MIN: CPT

## 2020-06-23 NOTE — PHYSICAL EXAM
[Normal] : no acute distress, well nourished, well developed and well-appearing [Normal Affect] : the affect was normal [Alert and Oriented x3] : oriented to person, place, and time [Normal Insight/Judgement] : insight and judgment were intact [de-identified] : Duputrens noted. No swelling

## 2020-06-23 NOTE — REVIEW OF SYSTEMS
Individual Follow-Up Form    6/23/2020    Quit Date:     Clinical Status of Patient: Outpatient    Length of Service: 30 minutes    Continuing Medication: yes  Wellbutrin or Nicotine gum    Other Medications:      Target Symptoms: Withdrawal and medication side effects. The following were rated moderate (3) to severe (4) on TCRS:  · Moderate (3): none  · Severe (4): none    Comments: 1-3 cig/day; The patient remains on the prescribed tobacco cessation medication regimen of 150 mg bupropion BID, along with 2mg nicotine gum for cravings (about 6-8 day), without any negative side effects at this time; pt reports noticing increased weight, discussed with pt weight management; pt talked about getting new dog to replace the companionship he misses since previous dog has passed, will help with idol time and quietness in home; commended pt on progress this far    Diagnosis: F17.210    Next Visit: 2 weeks     [Suicidal] : not suicidal [Insomnia] : insomnia [Anxiety] : anxiety [Depression] : no depression [Negative] : Neurological

## 2020-06-23 NOTE — HISTORY OF PRESENT ILLNESS
[FreeTextEntry8] : pt fell over this weekend, pain in left hand ring finger \par Better today\par Tripped in garden

## 2020-06-23 NOTE — ASSESSMENT
[FreeTextEntry1] : 65 y.o. M w/ advanced b/l femoroacetabular OA w/ relatively new onset HF (improved) w/ low EF s/p PCI and stent x2 doing well.   Patient understands that NSAIDs remain absolutely contraindicated given his cardiac history.  We will continue to hold his dose of oxycodone at 10 mg po bid prn # 30 and c/w oxycodone 5 mg (#15) for breakthrough pain prn.  I-STOP DATABASE CHECKED.  I again discussed R/B/A to tx w/ narcotic analgesics including but not limited to respiratory depression, CNS depression, N/V/C, physiological and psychological addiction and untimely death if not used as directed.  Pt. was again instructed not to take narcotic analgesics w/ benzodiazepines.  Given the ongoing COVID-19 pandemic and the patient's wife's chronic medical conditions (severe IBS for which he is the primary caregiver), he may follow up in office once per month, instead of q 2 weeks.  His Rx's will be dispensed every 2 weeks.   Pt. will have tooth extraction prior to NIKKO w/ Dr. Marina (he is thinking end of summer).

## 2020-06-23 NOTE — HISTORY OF PRESENT ILLNESS
[FreeTextEntry1] : 65 y.o. M w/ advanced b/l femoroacetabular OA w/ relatively new onset HF (stable) w/ low EF s/p PCI and stent x2 returns to office for f/u.   Pt. states that he tripped coming down ladder and did a "split" w/ increased right groin pain x 3-4 days.  Pain has now returned to its baseline.  Pt. is maintained on oxycodone 10 mg po bid prn and oxycodone 5 mg for breakthrough pain prn which he tolerates well.  Of note, pt. states that his LV EF has improved to about 40% on most recent echo.  No need for PM.  Also, pt. states that he needs two teeth removed prior to NIKKO 2' infection.

## 2020-07-14 ENCOUNTER — APPOINTMENT (OUTPATIENT)
Dept: PHYSICAL MEDICINE AND REHAB | Facility: CLINIC | Age: 65
End: 2020-07-14

## 2020-07-14 DIAGNOSIS — M54.16 RADICULOPATHY, LUMBAR REGION: ICD-10-CM

## 2020-07-21 ENCOUNTER — APPOINTMENT (OUTPATIENT)
Dept: MRI IMAGING | Facility: CLINIC | Age: 65
End: 2020-07-21
Payer: MEDICAID

## 2020-07-21 ENCOUNTER — APPOINTMENT (OUTPATIENT)
Dept: PHYSICAL MEDICINE AND REHAB | Facility: CLINIC | Age: 65
End: 2020-07-21
Payer: MEDICAID

## 2020-07-21 ENCOUNTER — OUTPATIENT (OUTPATIENT)
Dept: OUTPATIENT SERVICES | Facility: HOSPITAL | Age: 65
LOS: 1 days | End: 2020-07-21

## 2020-07-21 VITALS
HEART RATE: 79 BPM | HEIGHT: 70 IN | BODY MASS INDEX: 29.2 KG/M2 | TEMPERATURE: 98.6 F | WEIGHT: 204 LBS | DIASTOLIC BLOOD PRESSURE: 106 MMHG | SYSTOLIC BLOOD PRESSURE: 179 MMHG

## 2020-07-21 VITALS — SYSTOLIC BLOOD PRESSURE: 148 MMHG | DIASTOLIC BLOOD PRESSURE: 97 MMHG

## 2020-07-21 DIAGNOSIS — M54.16 RADICULOPATHY, LUMBAR REGION: ICD-10-CM

## 2020-07-21 PROCEDURE — 72148 MRI LUMBAR SPINE W/O DYE: CPT

## 2020-07-21 PROCEDURE — 99213 OFFICE O/P EST LOW 20 MIN: CPT

## 2020-07-21 NOTE — HISTORY OF PRESENT ILLNESS
[FreeTextEntry1] : 65 y.o. M w/ advanced b/l femoroacetabular OA w/ relatively new onset HF (stable) w/ low EF s/p PCI and stent x2 LV EF has improved to about 40% on most recent echo -   He report having exacerbated LBP after fishing - pain has been on going x 3 weeks.  Has been taking oxycodone more frequently with some benefit, tried chiro with temporary relief so he stopped.  He reports difficulty standing + transitional motion 2/2 pain. Pain is 10/10 - radiates across back and into groin and anteriorly down past RIGHT knees to shin.  Denies weakness, N/T, B/B incontinence, or saddle anesthesia. hurts

## 2020-07-21 NOTE — PHYSICAL EXAM
[FreeTextEntry1] : NAD.  \par A&Ox3\par Overweight but not obese.\par Exam is limited 2' pain\par L-spine ROM -  Extension 10' w/ hip pain.\par MMT: 5/5 B/L HF/KE/DF/PF\par DTR's 2+ knees/ankles.  \par SILT.  \par

## 2020-07-21 NOTE — HISTORY OF PRESENT ILLNESS
[FreeTextEntry1] : 65 y.o. M w/ advanced b/l femoroacetabular OA w/ relatively new onset HF (stable) w/ low EF s/p PCI and stent x2 LV EF has improved to about 40% on most recent echo -   He report having exacerbated LBP after fishing - pain has been on going x 3 weeks.  Has been taking oxycodone more frequently with some benefit, tried chiro with temporary relief so he stopped.  He reports difficulty standing + transitional motion 2/2 pain. Pain is 10/10 - radiates across back and into groin and anteriorly down past RIGHT knees to shin.  Denies weakness, N/T, B/B incontinence, or saddle anesthesia.

## 2020-07-21 NOTE — ASSESSMENT
[FreeTextEntry1] : 65 y.o. M w/ advanced b/l femoroacetabular OA w/ relatively new onset HF (improved) w/ low EF s/p PCI and stent x2 doing well.   Patient understands that NSAIDs remain absolutely contraindicated given his cardiac history.  \par Has exacerbated LBP - pending MRI + possible CATARINA.  Pain radiates along R L4 distribution.\par \par We will continue to hold his dose of oxycodone at 10 mg po bid prn # 30 and c/w oxycodone 5 mg (#15) for breakthrough pain prn.  I-STOP DATABASE CHECKED.  I have ordered Urine toxicology which is pending.  I again discussed R/B/A to tx w/ narcotic analgesics including but not limited to respiratory depression, CNS depression, N/V/C, physiological and psychological addiction and untimely death if not used as directed.  Pt. was again instructed not to take narcotic analgesics w/ benzodiazepines.  Given the ongoing COVID-19 pandemic and the patient's wife's chronic medical conditions (severe IBS for which he is the primary caregiver), he may follow up in office once per month, instead of q 2 weeks.  His Rx's will be dispensed every 2 weeks.  NIKKO w/ Dr. Marina still pending.

## 2020-07-23 ENCOUNTER — APPOINTMENT (OUTPATIENT)
Dept: PHYSICAL MEDICINE AND REHAB | Facility: CLINIC | Age: 65
End: 2020-07-23
Payer: MEDICAID

## 2020-07-23 VITALS
HEIGHT: 70 IN | DIASTOLIC BLOOD PRESSURE: 108 MMHG | HEART RATE: 83 BPM | TEMPERATURE: 97.6 F | BODY MASS INDEX: 29.2 KG/M2 | WEIGHT: 204 LBS | SYSTOLIC BLOOD PRESSURE: 177 MMHG

## 2020-07-23 PROCEDURE — 20553 NJX 1/MLT TRIGGER POINTS 3/>: CPT

## 2020-08-05 ENCOUNTER — APPOINTMENT (OUTPATIENT)
Dept: PHYSICAL MEDICINE AND REHAB | Facility: CLINIC | Age: 65
End: 2020-08-05

## 2020-08-06 ENCOUNTER — APPOINTMENT (OUTPATIENT)
Dept: PHYSICAL MEDICINE AND REHAB | Facility: AMBULATORY SURGERY CENTER | Age: 65
End: 2020-08-06

## 2020-08-07 ENCOUNTER — APPOINTMENT (OUTPATIENT)
Dept: PHYSICAL MEDICINE AND REHAB | Facility: CLINIC | Age: 65
End: 2020-08-07

## 2020-08-11 ENCOUNTER — APPOINTMENT (OUTPATIENT)
Dept: PHYSICAL MEDICINE AND REHAB | Facility: CLINIC | Age: 65
End: 2020-08-11
Payer: MEDICAID

## 2020-08-11 VITALS
SYSTOLIC BLOOD PRESSURE: 154 MMHG | HEIGHT: 70 IN | TEMPERATURE: 98.6 F | DIASTOLIC BLOOD PRESSURE: 91 MMHG | WEIGHT: 204 LBS | BODY MASS INDEX: 29.2 KG/M2 | HEART RATE: 109 BPM

## 2020-08-11 PROCEDURE — 99213 OFFICE O/P EST LOW 20 MIN: CPT

## 2020-08-11 NOTE — ASSESSMENT
[FreeTextEntry1] : 65 y.o. M w/ advanced b/l femoroacetabular OA w/ relatively new onset HF (improved) w/ low EF s/p PCI and stent x2 doing well.   Patient understands that NSAIDs remain absolutely contraindicated given his cardiac history.  \par MRI L-spine previously reviewed.  Do not recommend proceeding with lumbar MBBs as patient's LBP has improved.  He may continue to use home TENS unit for symptomatic relief.  Reviewed HEP.  \par \par We will continue to hold his dose of oxycodone at 10 mg po bid prn # 30 and c/w oxycodone 5 mg (#15) for breakthrough pain prn.  I-STOP DATABASE CHECKED.   Urine toxicology done.  I again discussed R/B/A to tx w/ narcotic analgesics including but not limited to respiratory depression, CNS depression, N/V/C, physiological and psychological addiction and untimely death if not used as directed.  Pt. was again instructed not to take narcotic analgesics w/ benzodiazepines.  Given the ongoing COVID-19 pandemic and the patient's wife's chronic medical conditions (severe IBS for which he is the primary caregiver), he may follow up in office once per month, instead of q 2 weeks.  His Rx's will be dispensed every 2 weeks.  NIKKO w/ Dr. Marina still pending.

## 2020-08-11 NOTE — HISTORY OF PRESENT ILLNESS
[FreeTextEntry1] : 65 y.o. M w/ h/o advanced b/l femoroacetabular OA w/ relatively new onset HF (improved) w/ low EF s/p PCI and stent x2 doing well.  Pt. states that last visit's TPI were no helpful.  Went to local ED last week but LBP has decreased somewhat.  Pt. states that ED gave him TENS unit for home use which has been helpful.  MRI L-spine previously reviewed.   Right hip pain has been bothering him more.  R NIKKO has been rescheduled for fall 2020.  Went back to work last Thursday.

## 2020-08-11 NOTE — DATA REVIEWED
[Plain X-Rays] : plain X-Rays [FreeTextEntry1] : MRI L-spine results. No significant HNP, stenosis, NF narrowing or BM lesion. \par \par X-rays pelvis and b/l hips reviewed w/ patient: c/w advanced L > R femoroactebular OA w/ decreased joint space and osteophytes.  Gun stock deformities.  Adequate coverage femoral head - no evidence of acetabular dysplasia.

## 2020-08-11 NOTE — PHYSICAL EXAM
[FreeTextEntry1] : NAD.  \par A&Ox3\par Overweight but not obese.\par No significant change in today's examination \par L-spine ROM -  near full forward flexion w/o pain; 10-15' passive extension w/ mild pain\par Hips - restricted ROM IR/ER B/L hips\par Pelvic tilt ++\par Seated slump - neg\par SEAN's - deferred\par MMT: 5/5 B/L HF/KE/DF/PF\par DTR's 2+ knees/ankles.  \par SILT.  \par

## 2020-08-21 ENCOUNTER — APPOINTMENT (OUTPATIENT)
Dept: FAMILY MEDICINE | Facility: CLINIC | Age: 65
End: 2020-08-21
Payer: MEDICAID

## 2020-08-21 VITALS
BODY MASS INDEX: 28.2 KG/M2 | SYSTOLIC BLOOD PRESSURE: 134 MMHG | HEART RATE: 100 BPM | DIASTOLIC BLOOD PRESSURE: 80 MMHG | WEIGHT: 197 LBS | OXYGEN SATURATION: 99 % | HEIGHT: 70 IN | TEMPERATURE: 96.9 F

## 2020-08-21 PROCEDURE — 99213 OFFICE O/P EST LOW 20 MIN: CPT

## 2020-08-24 NOTE — PHYSICAL EXAM
[Normal] : normal rate, regular rhythm, normal S1 and S2 and no murmur heard [No Carotid Bruits] : no carotid bruits [Alert and Oriented x3] : oriented to person, place, and time [No Edema] : there was no peripheral edema [Normal Insight/Judgement] : insight and judgment were intact [Normal Affect] : the affect was normal [de-identified] : Duputrens noted. No swelling

## 2020-08-24 NOTE — HISTORY OF PRESENT ILLNESS
[FreeTextEntry1] : patient is here for medication refill and referral for hip procedure [de-identified] : has not been using xanax but since pandemic has been unable to sleep

## 2020-08-27 ENCOUNTER — APPOINTMENT (OUTPATIENT)
Dept: PHYSICAL MEDICINE AND REHAB | Facility: CLINIC | Age: 65
End: 2020-08-27
Payer: MEDICAID

## 2020-08-27 VITALS
HEIGHT: 70 IN | WEIGHT: 197 LBS | BODY MASS INDEX: 28.2 KG/M2 | HEART RATE: 81 BPM | DIASTOLIC BLOOD PRESSURE: 98 MMHG | SYSTOLIC BLOOD PRESSURE: 151 MMHG

## 2020-08-27 DIAGNOSIS — M54.5 LOW BACK PAIN: ICD-10-CM

## 2020-08-27 PROCEDURE — 99214 OFFICE O/P EST MOD 30 MIN: CPT

## 2020-08-27 NOTE — ASSESSMENT
[FreeTextEntry1] : 65 y.o. M w/ advanced b/l femoroacetabular OA w/ relatively new onset HF (improved) w/ low EF s/p PCI and stent x2 doing well.  Regarding his hip pain, I had a long discussion w/ the patient related continuing tx w/ narcotic analgesics.  His regimen of oxycodone 10 mg po bid + 5 mg po qd prn for breakthrough pain is not working well.  I feel the patient would benefit from seeing a chronic pain management MD for consideration of LA narcotic analgesics to hold him until he can have his R NIKKO.  I have Rx'd him one week supply of oxycodone 10 mg; one tab po tid prn (#21) until he can get an appt.  I-STOP DATABASE CHECKED.  Urine toxicology previously done.  I again reviewed R/B/A of narcotic analgesics including but not limited to respiratory depression, CNS depression, N/V/C, physiological and psychological addiction and untimely death if not used as directed.  Pt. was again instructed not to take narcotic analgesics w/ benzodiazepines (prescribed from his PMD).   Pt. also understands that NSAIDs remain absolutely contraindicated given his cardiac history.   MRI L-spine previously reviewed.  Did not recommend proceeding with lumbar MBBs as patient's LBP has improved.  He may continue to use home TENS unit for symptomatic relief.  Reviewed HEP.   RTC after NIKKO.\par \par

## 2020-08-27 NOTE — HISTORY OF PRESENT ILLNESS
[FreeTextEntry1] : 65 y.o. M w/ advanced b/l femoroacetabular OA w/ relatively new onset HF (improved) w/ low EF s/p PCI and stent x2 returns to office for f/u.   He spoke w/ my NP last week as he ran out of his pain medication 2' increasing pain in his right hip.  He is describing right groin, deep gluteal pain and radiating pain into right calf.   He has been taking 3-4 tabs oxycodone/day.  NSAIDs remain contraindicated 2' to his cardiac hx.  He has appt. w/ Dr. Marina for NIKKO on Sept. 8.  MRI L-spine previously reviewed.  Did not recommend proceeding with lumbar MBBs as patient's LBP has improved.

## 2020-09-08 ENCOUNTER — APPOINTMENT (OUTPATIENT)
Dept: ORTHOPEDIC SURGERY | Facility: CLINIC | Age: 65
End: 2020-09-08
Payer: MEDICAID

## 2020-09-08 VITALS
SYSTOLIC BLOOD PRESSURE: 164 MMHG | HEART RATE: 72 BPM | WEIGHT: 197 LBS | DIASTOLIC BLOOD PRESSURE: 92 MMHG | HEIGHT: 70 IN | BODY MASS INDEX: 28.2 KG/M2

## 2020-09-08 PROCEDURE — 99215 OFFICE O/P EST HI 40 MIN: CPT

## 2020-09-08 PROCEDURE — 73502 X-RAY EXAM HIP UNI 2-3 VIEWS: CPT | Mod: RT

## 2020-09-08 NOTE — DISCUSSION/SUMMARY
[de-identified] : The patient is a 64 year old male with bone on bone arthritis of both hips, more consistently symptomatic on the right side, although both are painful. We we are going to reschedule his hip replacement surgery and follow-up with cardiology regarding timing and permission to hold his anticoagulation before surgery.  BAsed upon the patients continued symptoms and failure to respond to conservative treatment I have recommended a right total hip replacement for the patient. A discussion was had with the patient regarding a right total hip replacement. Anterior and posterior exposures were discussed. The patient would like to proceed with an anterior approach. A long discussion was had with the patient as what the total joint replacement would entail. A model was used to demonstrate the operation and to discuss bearing surfaces of the implants. The hospitalization and rehabilitation were discussed. The use of perioperative antibiotics and DVT prophylaxis were discussed. The risks, benefits and alternatives to surgical intervention were discussed at length with the patient. Specific risks discussed included: infection, wound breakdown, numbness and damage to nerves, tendon, muscle, arteries or other blood vessels, and the possibility of leg length discrepancy. The possibility of recurrent pain, no improvement in pain and actual worsening of the pain were also mentioned in conversation with the patient. Medical complications related to the patient's general medical health including deep vein thrombosis, pulmonary embolus, heart attack, stroke, death and other complications from anesthesia were discussed as well. The patient was told that we will take steps to minimize these risks by using sterile technique, antibiotics and DVT prophylaxis when appropriate and following the patient postoperatively in the clinic setting to monitor progress. The benefits of surgery were discussed with the patient including the potential to improve the current clinical condition through operative intervention. Alternatives to surgical intervention include continued conservative management which may yield less than optimal results in this particular patient. All questions were answered to the satisfaction of the patient. Models were used as an educational tool. We did discuss implant choices and fixation, with shared decision making with the patient.\par  \par We are planning for robotic assistance for the total hip replacement. We discussed that this will require placement of iliac crest pins in the contralateral iliac crest for pelvic bone registration to allow for robotic guidance for the surgery. We will utilize robotic assistance to improve accuracy of the implants, and accurate restoration of both leg lengths and femoral offset.

## 2020-09-08 NOTE — ADDENDUM
[FreeTextEntry1] : This note was authored by Paul Garibay working as a medical scribe for Dr. Jaswinder Marina. The note was reviewed, edited, and revised by Dr. Jaswinder Marina whom is in agreement with the exam findings, imaging findings, and treatment plan. 09/08/2020.

## 2020-09-08 NOTE — PHYSICAL EXAM
[de-identified] : The patient appears well nourished  and in no apparent distress.  The patient is alert and oriented to person, place, and time.   Affect and mood appear normal. The head is normocephalic and atraumatic.  The eyes reveal normal sclera and extra ocular muscles are intact. The tongue is midline with no apparent lesions.  Skin shows normal turgor with no evidence of eczema or psoriasis.  No respiratory distress noted.  Sensation grossly intact.\par   [de-identified] : Exam of the left hip shows neutral internal rotation, hip external rotation of 25 degrees with pain throughout.\par Exam of the right hip shows hip flexion of 90 degrees, hip external rotation of less than 30 degrees, internal rotation of 10 degrees with pain throughout, no lower leg edema. 5/5 motor strength bilaterally distally. Sensation intact distally.  [de-identified] : Xray- AP pelvis and 2 views right hip shows bone on bone arthritis of both hips

## 2020-09-08 NOTE — HISTORY OF PRESENT ILLNESS
[de-identified] : The patient is a 64 year old male being seen for evaluation of his right hip. He has a history of congenital hip dysplasia from childhood. He has advanced osteoarthritis of both hips. He reports being scheduled for surgery for January 15th of this year. He reports on the 10th of December of 2019 having a heart attack and undergoing stenting. He reports postponing surgery secondary to stenting. He is ambulating and transferring with severe pain and stiffness. He limps with ambulation. Pain is centered in the groin with radiation down the leg anterolaterally. He has difficulty donning/doffing shoes and socks and difficulty with transitioning in and out of the car. He reports pain at rest. Pain is worse with transferring and weightbearing activities, most especially stair climbing. He reports his right hip pain is severely limiting his quality of life at this time. He is on oxycodone with minimal relief of his symptoms. He is on Brilinta and a baby aspirin s/p stents. He comes in today for repeat evaluation and to discuss rescheduling surgery.

## 2020-09-17 ENCOUNTER — APPOINTMENT (OUTPATIENT)
Dept: PHYSICAL MEDICINE AND REHAB | Facility: CLINIC | Age: 65
End: 2020-09-17
Payer: MEDICAID

## 2020-09-17 VITALS
DIASTOLIC BLOOD PRESSURE: 113 MMHG | WEIGHT: 197 LBS | HEIGHT: 70 IN | HEART RATE: 73 BPM | BODY MASS INDEX: 28.2 KG/M2 | SYSTOLIC BLOOD PRESSURE: 170 MMHG

## 2020-09-17 PROCEDURE — 99212 OFFICE O/P EST SF 10 MIN: CPT

## 2020-09-17 NOTE — HISTORY OF PRESENT ILLNESS
[FreeTextEntry1] : 65 y.o. M w/ h/o advanced b/l femoroacetabular OA w/ relatively new onset HF w/ low EF s/p PCI and stent x2 doing well returns to office for f/u.  Pt. is maintained on oxycodone 10 mg po tid which covers his pain better.  He occasionally needs to take an additional 1/2 tab for night time pain.  Pt. is tolerating the medication well without significant side effects.  He denies GI, CNS or respiratory depression.  Pt. states that his LBP has settled down.  Pt. has had difficulty getting an appointment with a chronic pain MD.  He saw Dr. Marina and is pending cardiology clearance for R NIKKO.

## 2020-09-17 NOTE — ASSESSMENT
[FreeTextEntry1] : 65 y.o. M w/ h/o advanced b/l femoroacetabular OA w/ relatively new onset HF w/ low EF (improved) s/p PCI and stent x2 doing well.  Regarding his hip pain, I will continue to provide one week supplies of oxycodone 10 mg; one tab po tid prn (#21) until his R NIKKO.  I-STOP DATABASE CHECKED.  Urine toxicology previously done.  I again reviewed R/B/A of narcotic analgesics including but not limited to respiratory depression, CNS depression, N/V/C, physiological and psychological addiction and untimely death if not used as directed.  Pt. was again instructed not to take narcotic analgesics w/ benzodiazepines (prescribed from his PMD).   Pt. also understands that NSAIDs remain absolutely contraindicated given his cardiac history.   MRI L-spine previously reviewed.  No need for lumbar MBBs as patient's LBP has improved.  He may continue to use home TENS unit for symptomatic relief.  Reviewed HEP.   Will eRx next week and pt. will RTC 2 weeks for f/u.  \par

## 2020-09-24 ENCOUNTER — APPOINTMENT (OUTPATIENT)
Dept: FAMILY MEDICINE | Facility: CLINIC | Age: 65
End: 2020-09-24
Payer: MEDICAID

## 2020-09-24 VITALS
DIASTOLIC BLOOD PRESSURE: 90 MMHG | BODY MASS INDEX: 28.2 KG/M2 | HEART RATE: 75 BPM | WEIGHT: 197 LBS | HEIGHT: 70 IN | SYSTOLIC BLOOD PRESSURE: 156 MMHG | OXYGEN SATURATION: 97 % | TEMPERATURE: 98.4 F

## 2020-09-24 DIAGNOSIS — Z23 ENCOUNTER FOR IMMUNIZATION: ICD-10-CM

## 2020-09-24 DIAGNOSIS — M70.22 OLECRANON BURSITIS, LEFT ELBOW: ICD-10-CM

## 2020-09-24 PROCEDURE — 99213 OFFICE O/P EST LOW 20 MIN: CPT | Mod: 25

## 2020-09-24 PROCEDURE — G0008: CPT

## 2020-09-24 PROCEDURE — 90686 IIV4 VACC NO PRSV 0.5 ML IM: CPT

## 2020-09-28 NOTE — PHYSICAL EXAM
[Normal] : normal rate, regular rhythm, normal S1 and S2 and no murmur heard [No Carotid Bruits] : no carotid bruits [No Edema] : there was no peripheral edema [Normal Affect] : the affect was normal [Alert and Oriented x3] : oriented to person, place, and time [Normal Insight/Judgement] : insight and judgment were intact [de-identified] : Duputrens noted. No swelling, l l elbow soreness. No swelling or redness

## 2020-09-28 NOTE — HISTORY OF PRESENT ILLNESS
[FreeTextEntry8] : pt has pain in left elbow\par pt wants flu shot today\par Uses arm frequently at work\par \par Will be having hip surgery in a few months

## 2020-10-01 ENCOUNTER — APPOINTMENT (OUTPATIENT)
Dept: PHYSICAL MEDICINE AND REHAB | Facility: CLINIC | Age: 65
End: 2020-10-01
Payer: MEDICAID

## 2020-10-01 VITALS
HEIGHT: 70 IN | WEIGHT: 197 LBS | SYSTOLIC BLOOD PRESSURE: 157 MMHG | BODY MASS INDEX: 28.2 KG/M2 | DIASTOLIC BLOOD PRESSURE: 92 MMHG | HEART RATE: 65 BPM | TEMPERATURE: 98.4 F

## 2020-10-01 PROCEDURE — 99213 OFFICE O/P EST LOW 20 MIN: CPT

## 2020-10-01 NOTE — HISTORY OF PRESENT ILLNESS
[FreeTextEntry1] : 65 y.o. M w/ h/o advanced b/l femoroacetabular OA w/ relatively new onset HF w/ low EF (improved) s/p PCI and stent x2 returns to office for med refill.  Pt. is maintained on oxycodone 10 mg po tid which he tolerates well.  Denies N/V/C.  No CNS or respiratory depression.  Pt. states that the medication does help him work.  He is pending R NIKKO w/ Dr. Marina possibly in November.  Pt. states that his LBP is gone.  Denies radicular pain down legs.

## 2020-10-01 NOTE — PHYSICAL EXAM
[FreeTextEntry1] : NAD.  \par A&Ox3\par Overweight but not obese.\par No significant change in today's examination \par L-spine ROM -  near full forward flexion w/o pain; 10-15' passive extension w/ mild pain\par Hips - restricted ROM IR/ER B/L hips w/ pain\par Pelvic tilt ++\par Seated slump - neg\par SEAN's - deferred\par MMT: 5/5 B/L HF/KE/DF/PF\par DTR's 2+ knees/ankles.  \par SILT.  \par

## 2020-10-02 ENCOUNTER — APPOINTMENT (OUTPATIENT)
Dept: UROLOGY | Facility: CLINIC | Age: 65
End: 2020-10-02
Payer: MEDICAID

## 2020-10-02 VITALS
HEART RATE: 69 BPM | HEIGHT: 70 IN | SYSTOLIC BLOOD PRESSURE: 155 MMHG | DIASTOLIC BLOOD PRESSURE: 93 MMHG | WEIGHT: 197 LBS | BODY MASS INDEX: 28.2 KG/M2 | TEMPERATURE: 96.1 F

## 2020-10-02 PROCEDURE — 99204 OFFICE O/P NEW MOD 45 MIN: CPT

## 2020-10-02 NOTE — PHYSICAL EXAM
[General Appearance - Well Developed] : well developed [General Appearance - Well Nourished] : well nourished [Normal Appearance] : normal appearance [Well Groomed] : well groomed [General Appearance - In No Acute Distress] : no acute distress [Edema] : no peripheral edema [Respiration, Rhythm And Depth] : normal respiratory rhythm and effort [Exaggerated Use Of Accessory Muscles For Inspiration] : no accessory muscle use [Abdomen Soft] : soft [Abdomen Tenderness] : non-tender [Costovertebral Angle Tenderness] : no ~M costovertebral angle tenderness [Urethral Meatus] : meatus normal [Penis Abnormality] : normal circumcised penis [Urinary Bladder Findings] : the bladder was normal on palpation [Scrotum] : the scrotum was normal [Testes Tenderness] : no tenderness of the testes [Testes Mass (___cm)] : there were no testicular masses [Prostate Tenderness] : the prostate was not tender [No Prostate Nodules] : no prostate nodules [Prostate Size ___ gm] : prostate size [unfilled] gm [FreeTextEntry1] : no plaque palpated on shaft of penis [Normal Station and Gait] : the gait and station were normal for the patient's age [] : no rash [No Focal Deficits] : no focal deficits [Oriented To Time, Place, And Person] : oriented to person, place, and time [Affect] : the affect was normal [Mood] : the mood was normal [Not Anxious] : not anxious [No Palpable Adenopathy] : no palpable adenopathy

## 2020-10-02 NOTE — ASSESSMENT
[FreeTextEntry1] : 66 yo M with ED and peyronie disease. \par For ED, we discussed that ED etiology can be psychogenic, arterial, venous leak, neurologic or a combination. Penile Ultrasound can be performed to evaluate cardiovascular causes. We discussed treatment options include oral PDE5 inhibitors, intraurethral alprostadil, intracavernosal injections, or penile implant placement surgery. Pt will discuss PDE5 with his cardiologist. If cardiologist clear pt for this, he will call this office and Rx will be placed. \par \par For peyronie diease, discussed treatment options include observation or collagenase injection therapy with manual modeling. Pt will think about it. Will refer to Dr Alvarez if pt chooses collagenase therapy.

## 2020-10-02 NOTE — HISTORY OF PRESENT ILLNESS
[FreeTextEntry1] : 65 year old man seen 10/02/2020 with complaint of erectile dysfunction. He reports poor tumescence and rigidity and duration. For ED medicaiton, he has tried Cialis with some response. Since that time, he reports he had MI. He also reports erection curves cranially. The angle of the curve has not worsened in months. The curve does not preclude penetration. The ED began years ago. \par It is moderate in severity. Nothing makes the symptoms better, nothing makes sx worse. \par It is associated with nothing.\par No hematuria, no dysuria, no frequency, no urgency, no hesitancy, no straining. No incontinence. \par No fevers, no chills, no nausea, no vomiting, no flank pain. \par \par No family history contributory to ED or Peyronie disease.

## 2020-10-15 ENCOUNTER — APPOINTMENT (OUTPATIENT)
Dept: PHYSICAL MEDICINE AND REHAB | Facility: CLINIC | Age: 65
End: 2020-10-15
Payer: MEDICAID

## 2020-10-15 PROCEDURE — 99213 OFFICE O/P EST LOW 20 MIN: CPT

## 2020-10-15 NOTE — PHYSICAL EXAM
[FreeTextEntry1] : NAD.  \par A&Ox3\par Overweight but not obese.\par No significant change in today's examination \par L-spine ROM -  near full forward flexion w/o pain; 10-15' passive extension w/o pain\par Hips - restricted ROM IR/ER B/L hips w/ pain\par Pelvic tilt ++\par Seated slump - neg\par SEAN's - deferred\par MMT: 5/5 B/L HF/KE/DF/PF\par DTR's 2+ knees/ankles.  \par SILT.  \par

## 2020-10-15 NOTE — ASSESSMENT
[FreeTextEntry1] : 65 y.o. M w/ h/o advanced b/l femoroacetabular OA w/ relatively new onset HF w/ low EF (improved) s/p PCI and stent x2 doing well.  Regarding his hip pain, I will continue to provide one week supplies of oxycodone 10 mg; one tab po tid prn (#21) until his R NIKKO (12/16/20) then we will attempt to wean down his medication.  I-STOP DATABASE CHECKED.  Urine toxicology previously done.  I again reviewed R/B/A of narcotic analgesics including but not limited to respiratory depression, CNS depression, N/V/C, physiological and psychological addiction and untimely death if not used as directed.  Pt. was again instructed not to take narcotic analgesics w/ benzodiazepines (prescribed from his PMD).   Pt. also understands that NSAIDs remain absolutely contraindicated given his cardiac history.   MRI L-spine previously reviewed.  No need for lumbar MBBs as patient's LBP has improved.  He may continue to use home TENS unit for symptomatic relief.  Reviewed HEP.   Will eRx next week and pt. will RTC 2 weeks for f/u.  \par

## 2020-10-15 NOTE — HISTORY OF PRESENT ILLNESS
[FreeTextEntry1] : 65 y.o. M w/ h/o advanced b/l femoroacetabular OA w/ relatively new onset HF w/ low EF (improved) s/p PCI and stent x2 returns to office for medication refill.  Pt. is maintained on oxycodone 10 mg po tid pending R NIKKO booked for 12/16/20.  Pt. is tolerating medication well without adverse effects.  Denies N/V/C, cognitive or respiratory effects.  Pt. states that he does not take the alprazolam consistently.

## 2020-10-19 ENCOUNTER — APPOINTMENT (OUTPATIENT)
Dept: CT IMAGING | Facility: CLINIC | Age: 65
End: 2020-10-19
Payer: MEDICAID

## 2020-10-19 ENCOUNTER — OUTPATIENT (OUTPATIENT)
Dept: OUTPATIENT SERVICES | Facility: HOSPITAL | Age: 65
LOS: 1 days | End: 2020-10-19
Payer: MEDICAID

## 2020-10-19 DIAGNOSIS — M16.11 UNILATERAL PRIMARY OSTEOARTHRITIS, RIGHT HIP: ICD-10-CM

## 2020-10-19 PROCEDURE — 73700 CT LOWER EXTREMITY W/O DYE: CPT

## 2020-10-19 PROCEDURE — 73700 CT LOWER EXTREMITY W/O DYE: CPT | Mod: 26,RT

## 2020-10-27 ENCOUNTER — APPOINTMENT (OUTPATIENT)
Dept: PHYSICAL MEDICINE AND REHAB | Facility: CLINIC | Age: 65
End: 2020-10-27
Payer: MEDICAID

## 2020-10-27 VITALS
HEIGHT: 70 IN | HEART RATE: 67 BPM | TEMPERATURE: 96.6 F | BODY MASS INDEX: 28.2 KG/M2 | WEIGHT: 197 LBS | SYSTOLIC BLOOD PRESSURE: 150 MMHG | DIASTOLIC BLOOD PRESSURE: 96 MMHG

## 2020-10-27 PROCEDURE — 99072 ADDL SUPL MATRL&STAF TM PHE: CPT

## 2020-10-27 PROCEDURE — 99213 OFFICE O/P EST LOW 20 MIN: CPT

## 2020-10-27 NOTE — ASSESSMENT
[FreeTextEntry1] : 65 y.o. M w/ h/o advanced b/l femoroacetabular OA w/ relatively new onset HF w/ low EF (improved) s/p PCI and stent x2 doing well.  Regarding his hip pain, I do not want to increase his dose of oxycodone.  He may supplement with acetaminophen (not to exceed 2-3 g/day).  I will continue to provide one week supplies of oxycodone 10 mg; one tab po tid prn (#21) until his R NIKKO (12/16/20) then we will attempt to wean down his medication.  I-STOP DATABASE CHECKED.  Urine toxicology previously done.  I again reviewed R/B/A of narcotic analgesics including but not limited to respiratory depression, CNS depression, N/V/C, physiological and psychological addiction and untimely death if not used as directed.  Pt. was again instructed not to take narcotic analgesics w/ benzodiazepines (prescribed from his PMD).   Pt. also understands that NSAIDs remain absolutely contraindicated given his cardiac history.   MRI L-spine previously reviewed.  No need for lumbar MBBs as patient's LBP has improved.  He may continue to use home TENS unit for symptomatic relief.  Reviewed HEP.   Will eRx next week and pt. will RTC 2 weeks for f/u.  \par

## 2020-11-03 ENCOUNTER — APPOINTMENT (OUTPATIENT)
Dept: FAMILY MEDICINE | Facility: CLINIC | Age: 65
End: 2020-11-03
Payer: MEDICAID

## 2020-11-03 VITALS
HEART RATE: 72 BPM | TEMPERATURE: 97.8 F | DIASTOLIC BLOOD PRESSURE: 80 MMHG | BODY MASS INDEX: 28.63 KG/M2 | WEIGHT: 200 LBS | SYSTOLIC BLOOD PRESSURE: 122 MMHG | HEIGHT: 70 IN | RESPIRATION RATE: 18 BRPM | OXYGEN SATURATION: 97 %

## 2020-11-03 PROCEDURE — 99072 ADDL SUPL MATRL&STAF TM PHE: CPT

## 2020-11-03 PROCEDURE — 99213 OFFICE O/P EST LOW 20 MIN: CPT

## 2020-11-03 NOTE — PHYSICAL EXAM
[Normal] : normal rate, regular rhythm, normal S1 and S2 and no murmur heard [No Carotid Bruits] : no carotid bruits [No Edema] : there was no peripheral edema [Normal Affect] : the affect was normal [Alert and Oriented x3] : oriented to person, place, and time [Normal Insight/Judgement] : insight and judgment were intact [de-identified] : Duputrens noted. No swelling, l l elbow soreness. No swelling or redness

## 2020-11-03 NOTE — PLAN
[FreeTextEntry1] : will have surgery at [facility] .\par Will see after PST done for clearance\par Will need cardiology clearance.\par

## 2020-11-10 ENCOUNTER — APPOINTMENT (OUTPATIENT)
Dept: PHYSICAL MEDICINE AND REHAB | Facility: CLINIC | Age: 65
End: 2020-11-10
Payer: MEDICAID

## 2020-11-10 VITALS — TEMPERATURE: 96.7 F

## 2020-11-10 VITALS
BODY MASS INDEX: 28.63 KG/M2 | HEART RATE: 74 BPM | WEIGHT: 200 LBS | SYSTOLIC BLOOD PRESSURE: 159 MMHG | DIASTOLIC BLOOD PRESSURE: 99 MMHG | HEIGHT: 70 IN

## 2020-11-10 PROCEDURE — 99213 OFFICE O/P EST LOW 20 MIN: CPT

## 2020-11-10 PROCEDURE — 99072 ADDL SUPL MATRL&STAF TM PHE: CPT

## 2020-11-10 NOTE — HISTORY OF PRESENT ILLNESS
[FreeTextEntry1] : 65 y.o. M w/ h/o advanced b/l femoroacetabular OA w/ relatively new onset HF w/ low EF (improved) s/p PCI and stent x2 returns to office for f/u and medication renewal.  Pt. went for UTOX - results pending.  Pt. is maintained on oxycodone 10 mg; one tab po tid and is tolerating medication well.  Denies N/V/C.  No CNS effects.  R NIKKO booked for 12/16/20 w/ Dr. Marina.  Pt's LBP is gone.

## 2020-11-10 NOTE — PHYSICAL EXAM
[FreeTextEntry1] : NAD.  \par A&Ox3\par Overweight but not obese.\par No significant change in today's examination \par L-spine ROM -  near full forward flexion w/o pain; 10-15' passive extension w/o pain\par Hips - restricted ROM IR/ER B/L hips w/ pain\par Pelvic tilt ++\par Seated slump - neg\par SENA's - deferred\par MMT: 5/5 B/L HF/KE/DF/PF\par DTR's 2+ knees/ankles.  \par SILT.  \par

## 2020-11-10 NOTE — ASSESSMENT
[FreeTextEntry1] : 65 y.o. M w/ h/o advanced b/l femoroacetabular OA w/ relatively new onset HF w/ low EF (improved) s/p PCI and stent x2 doing well.  Regarding his hip pain, pt. wishes to try 5 mg tabs so he has option of taking lower dose when need.  Will change his Rx to oxycodone 5 mg; 1-2 tabs po tid prn (#42) until his R NIKKO (12/16/20) then we will attempt to wean down his medication.  I-STOP DATABASE CHECKED.  New Urine toxicology pending.  I again reviewed R/B/A of narcotic analgesics including but not limited to respiratory depression, CNS depression, N/V/C, physiological and psychological addiction and untimely death if not used as directed.  Pt. was again instructed not to take narcotic analgesics w/ benzodiazepines (prescribed from his PMD).   Pt. also understands that NSAIDs remain absolutely contraindicated given his cardiac history.  Reviewed HEP.   Will eRx next week and pt. will RTC 2 weeks for f/u.  \par

## 2020-11-24 ENCOUNTER — APPOINTMENT (OUTPATIENT)
Dept: PHYSICAL MEDICINE AND REHAB | Facility: CLINIC | Age: 65
End: 2020-11-24

## 2020-11-25 ENCOUNTER — OUTPATIENT (OUTPATIENT)
Dept: OUTPATIENT SERVICES | Facility: HOSPITAL | Age: 65
LOS: 1 days | End: 2020-11-25
Payer: MEDICAID

## 2020-11-25 VITALS
RESPIRATION RATE: 18 BRPM | WEIGHT: 202.83 LBS | TEMPERATURE: 98 F | HEIGHT: 70 IN | DIASTOLIC BLOOD PRESSURE: 90 MMHG | SYSTOLIC BLOOD PRESSURE: 140 MMHG | HEART RATE: 70 BPM

## 2020-11-25 DIAGNOSIS — Z91.89 OTHER SPECIFIED PERSONAL RISK FACTORS, NOT ELSEWHERE CLASSIFIED: ICD-10-CM

## 2020-11-25 DIAGNOSIS — Z01.818 ENCOUNTER FOR OTHER PREPROCEDURAL EXAMINATION: ICD-10-CM

## 2020-11-25 DIAGNOSIS — I10 ESSENTIAL (PRIMARY) HYPERTENSION: ICD-10-CM

## 2020-11-25 DIAGNOSIS — E78.00 PURE HYPERCHOLESTEROLEMIA, UNSPECIFIED: ICD-10-CM

## 2020-11-25 DIAGNOSIS — Z13.89 ENCOUNTER FOR SCREENING FOR OTHER DISORDER: ICD-10-CM

## 2020-11-25 DIAGNOSIS — Z95.5 PRESENCE OF CORONARY ANGIOPLASTY IMPLANT AND GRAFT: ICD-10-CM

## 2020-11-25 DIAGNOSIS — Z95.5 PRESENCE OF CORONARY ANGIOPLASTY IMPLANT AND GRAFT: Chronic | ICD-10-CM

## 2020-11-25 DIAGNOSIS — Z98.890 OTHER SPECIFIED POSTPROCEDURAL STATES: Chronic | ICD-10-CM

## 2020-11-25 DIAGNOSIS — M16.11 UNILATERAL PRIMARY OSTEOARTHRITIS, RIGHT HIP: ICD-10-CM

## 2020-11-25 DIAGNOSIS — Z29.9 ENCOUNTER FOR PROPHYLACTIC MEASURES, UNSPECIFIED: ICD-10-CM

## 2020-11-25 LAB
A1C WITH ESTIMATED AVERAGE GLUCOSE RESULT: 5.9 % — HIGH (ref 4–5.6)
ANION GAP SERPL CALC-SCNC: 20 MMOL/L — HIGH (ref 5–17)
APTT BLD: 30.2 SEC — SIGNIFICANT CHANGE UP (ref 27.5–35.5)
BLD GP AB SCN SERPL QL: SIGNIFICANT CHANGE UP
BUN SERPL-MCNC: 21 MG/DL — HIGH (ref 8–20)
CALCIUM SERPL-MCNC: 9.6 MG/DL — SIGNIFICANT CHANGE UP (ref 8.6–10.2)
CHLORIDE SERPL-SCNC: 101 MMOL/L — SIGNIFICANT CHANGE UP (ref 98–107)
CO2 SERPL-SCNC: 25 MMOL/L — SIGNIFICANT CHANGE UP (ref 22–29)
CREAT SERPL-MCNC: 0.96 MG/DL — SIGNIFICANT CHANGE UP (ref 0.5–1.3)
ESTIMATED AVERAGE GLUCOSE: 123 MG/DL — HIGH (ref 68–114)
GLUCOSE SERPL-MCNC: 143 MG/DL — HIGH (ref 70–99)
HCT VFR BLD CALC: 46.5 % — SIGNIFICANT CHANGE UP (ref 39–50)
HGB BLD-MCNC: 14.8 G/DL — SIGNIFICANT CHANGE UP (ref 13–17)
INR BLD: 0.94 RATIO — SIGNIFICANT CHANGE UP (ref 0.88–1.16)
MCHC RBC-ENTMCNC: 31.8 GM/DL — LOW (ref 32–36)
MCHC RBC-ENTMCNC: 32.2 PG — SIGNIFICANT CHANGE UP (ref 27–34)
MCV RBC AUTO: 101.3 FL — HIGH (ref 80–100)
MRSA PCR RESULT.: SIGNIFICANT CHANGE UP
PLATELET # BLD AUTO: 216 K/UL — SIGNIFICANT CHANGE UP (ref 150–400)
POTASSIUM SERPL-MCNC: 3.9 MMOL/L — SIGNIFICANT CHANGE UP (ref 3.5–5.3)
POTASSIUM SERPL-SCNC: 3.9 MMOL/L — SIGNIFICANT CHANGE UP (ref 3.5–5.3)
PROTHROM AB SERPL-ACNC: 10.9 SEC — SIGNIFICANT CHANGE UP (ref 10.6–13.6)
RBC # BLD: 4.59 M/UL — SIGNIFICANT CHANGE UP (ref 4.2–5.8)
RBC # FLD: 13.3 % — SIGNIFICANT CHANGE UP (ref 10.3–14.5)
S AUREUS DNA NOSE QL NAA+PROBE: SIGNIFICANT CHANGE UP
SODIUM SERPL-SCNC: 145 MMOL/L — SIGNIFICANT CHANGE UP (ref 135–145)
WBC # BLD: 8.47 K/UL — SIGNIFICANT CHANGE UP (ref 3.8–10.5)
WBC # FLD AUTO: 8.47 K/UL — SIGNIFICANT CHANGE UP (ref 3.8–10.5)

## 2020-11-25 PROCEDURE — 93005 ELECTROCARDIOGRAM TRACING: CPT

## 2020-11-25 PROCEDURE — 93010 ELECTROCARDIOGRAM REPORT: CPT

## 2020-11-25 PROCEDURE — G0463: CPT

## 2020-11-25 RX ORDER — SODIUM CHLORIDE 9 MG/ML
3 INJECTION INTRAMUSCULAR; INTRAVENOUS; SUBCUTANEOUS EVERY 8 HOURS
Refills: 0 | Status: DISCONTINUED | OUTPATIENT
Start: 2020-12-16 | End: 2020-12-17

## 2020-11-25 NOTE — H&P PST ADULT - ASSESSMENT
This is a A&Ox3 65 year old  male in distress secondary to pain  with PMH HTN, high cholesterol, CAD and MI with 2 stents (Dec. 2019) present today for PST. Patient c/o bilateral hip pain R>L for several years due to congential hip dysplasia. States pain in right hip become progressively worse over the last 4 months, and has severely affected his ADLs and mobility.  Describes pain as constant, sharp and achy 8/10 without pain medication that radiates on occasion down leg to knee. States pain medication helps bring pain level down 3/10.  Patient states he has to "I crawl out of bed every morning and unable to walk or move until I take my pain medication". Aggravating factors- driving, sitting too long, prolonged standing, donning and doffing socks and shoes. Alleviating factors- pain medication and ice to joint.  States as a child diagnosed with congential hip dysplasia, and at age 5 had a body cast from mid chest to ankles for a few months. States growing up informed he would eventually need hip replacement as an older adult. 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 States  "I live my life on pain medication and I hate it". He is followed by pain management and Dr. Vogt recommended cortisone injections but patient refused.  He was previously scheduled to have total hip replacement surgery 2020 but had a heart attack 2019 and surgery was postponed. Patient states had x-ray and CT scan which showed bone on bone.  He is now scheduled for right total hip replacement, direct anterior approach with NIRAJ robot on  with Dr. Marina pending medical and cardiology clearance.    PCP Dr. Wolf [phone] appointment   Cardiology DR. Chavez appointment to be scheduled [phone]    Patient educated on surgical scrub and preadmission instructions, understanding verbalized.  Patient on aspirin a Brilinta since MI with stent placement, educated to speak to cardiologist  regarding when to stop medication for surgery, understanding verbalized.    CAPRINI SCORE [CLOT]    AGE RELATED RISK FACTORS                                                       MOBILITY RELATED FACTORS  [ ] Age 41-60 years                                            (1 Point)                  [ ] Bed rest                                                        (1 Point)  [X ] Age: 61-74 years                                           (2 Points)                 [ ] Plaster cast                                                   (2 Points)  [ ] Age= 75 years                                              (3 Points)                 [ ] Bed bound for more than 72 hours                 (2 Points)    DISEASE RELATED RISK FACTORS                                               GENDER SPECIFIC FACTORS  [ ] Edema in the lower extremities                       (1 Point)                  [ ] Pregnancy                                                     (1 Point)  [ ] Varicose veins                                               (1 Point)                  [ ] Post-partum < 6 weeks                                   (1 Point)             [X ] BMI > 25 Kg/m2                                            (1 Point)                  [ ] Hormonal therapy  or oral contraception          (1 Point)                 [ ] Sepsis (in the previous month)                        (1 Point)                  [ ] History of pregnancy complications                 (1 point)  [ ] Pneumonia or serious lung disease                                               [ ] Unexplained or recurrent                     (1 Point)           (in the previous month)                               (1 Point)  [ ] Abnormal pulmonary function test                     (1 Point)                 SURGERY RELATED RISK FACTORS  [ ] Acute myocardial infarction                              (1 Point)                 [ ]  Section                                             (1 Point)  [ ] Congestive heart failure (in the previous month)  (1 Point)               [ ] Minor surgery                                                  (1 Point)   [ ] Inflammatory bowel disease                             (1 Point)                 [ ] Arthroscopic surgery                                        (2 Points)  [ ] Central venous access                                      (2 Points)                [ ] General surgery lasting more than 45 minutes   (2 Points)       [ ] Stroke (in the previous month)                          (5 Points)               [X ] Elective arthroplasty                                         (5 Points)                                                                                                                                               HEMATOLOGY RELATED FACTORS                                                 TRAUMA RELATED RISK FACTORS  [ ] Prior episodes of VTE                                     (3 Points)                [ ] Fracture of the hip, pelvis, or leg                       (5 Points)  [ ] Positive family history for VTE                         (3 Points)                 [ ] Acute spinal cord injury (in the previous month)  (5 Points)  [ ] Prothrombin 74849 A                                     (3 Points)                 [ ] Paralysis  (less than 1 month)                             (5 Points)  [ ] Factor V Leiden                                             (3 Points)                  [ ] Multiple Trauma within 1 month                        (5 Points)  [ ] Lupus anticoagulants                                     (3 Points)                                                           [ ] Anticardiolipin antibodies                               (3 Points)                                                       [ ] High homocysteine in the blood                      (3 Points)                                             [ ] Other congenital or acquired thrombophilia      (3 Points)                                                [ ] Heparin induced thrombocytopenia                  (3 Points)                                          Total Score [    8      ]    Caprini Score 0 - 2:  Low Risk, No VTE Prophylaxis required for most patients, encourage ambulation  Caprini Score 3 - 6:  At Risk, pharmacologic VTE prophylaxis is indicated for most patients (in the absence of a contraindication)  Caprini Score Greater than or = 7:  High Risk, pharmacologic VTE prophylaxis is indicated for most patients (in the absence of a contraindication)    OPIOID RISK TOOL    QIANA EACH BOX THAT APPLIES AND ADD TOTALS AT THE END    FAMILY HISTORY OF SUBSTANCE ABUSE                 FEMALE         MALE                                                Alcohol                             [  ]1 pt          [ X ]3pts                                               Illegal Durgs                     [  ]2 pts        [  ]3pts                                               Rx Drugs                           [  ]4 pts        [  ]4 pts    PERSONAL HISTORY OF SUBSTANCE ABUSE                                                                                          Alcohol                             [  ]3 pts       [  ]3 pts                                               Illegal Drugs                     [  ]4 pts        [  ]4 pts                                               Rx Drugs                           [  ]5 pts        [  ]5 pts    AGE BETWEEN 16-45 YEARS                                      [  ]1 pt         [  ]1 pt    HISTORY OF PREADOLESCENT   SEXUAL ABUSE                                                             [  ]3 pts        [  ]0pts    PSYCHOLOGICAL DISEASE                     ADD, OCD, Bipolar, Schizophrenia        [  ]2 pts         [  ]2 pts                      Depression                                               [  ]1 pt           [  ]1 pt           SCORING TOTAL   (add numbers and type here)              (**3*)                                     A score of 3 or lower indicated LOW risk for future opioid abuse  A score of 4 to 7 indicated moderate risk for future opioid abuse  A score of 8 or higher indicates a high risk for opioid abuse

## 2020-11-25 NOTE — H&P PST ADULT - NEGATIVE OPHTHALMOLOGIC SYMPTOMS
no diplopia/no photophobia/no lacrimation L/no lacrimation R/no blurred vision L/no blurred vision R/no discharge L/no discharge R/no pain L/no pain R

## 2020-11-25 NOTE — PATIENT PROFILE ADULT - NSPROGENSOURCEINFO_GEN_A_NUR
patient Pt [place] and/or internationally in last 14-21 days./patient

## 2020-11-25 NOTE — H&P PST ADULT - NSICDXPROBLEM_GEN_ALL_CORE_FT
PROBLEM DIAGNOSES  Problem: H/O heart artery stent  Assessment and Plan: Patient with 2 stents on brilianta and aspirin.  Follow cardiologist recommendations on stopping medication prior to surgery.     Problem: HTN (hypertension)  Assessment and Plan: Continue medication as prescribed.   Take blood pressure medication with sip of water day of procedure.  Cardiac clearance pending.     Problem: Unilateral primary osteoarthritis, right hip  Assessment and Plan: Scheduled right total hip replacement direct anterior approach with NIRAJ robot on 12/16 with Dr. Marina pending medical and cardiac clearance.     Problem: High cholesterol  Assessment and Plan: Continue medications as prescribed.  Medical clearance pending.    Problem: Screening for substance abuse  Assessment and Plan: ORT score 3 patient at low risk     Problem: Need for prophylactic measure  Assessment and Plan: CAP score 4 patient at moderate risk for VTE  Bilateral pneumatic compression device ordered for day of surgery.  Surgical team to evaluate alejandra for prophylactic measures.         PROBLEM DIAGNOSES  Problem: Risk factors for obstructive sleep apnea  Assessment and Plan: BANG score 4 patient at intermediate risk     Problem: H/O heart artery stent  Assessment and Plan: Patient with 2 stents on brilianta and aspirin.  Follow cardiologist recommendations on stopping medication prior to surgery.     Problem: HTN (hypertension)  Assessment and Plan: Continue medication as prescribed.   Take blood pressure medication with sip of water day of procedure.  Cardiac clearance pending.     Problem: Unilateral primary osteoarthritis, right hip  Assessment and Plan: Scheduled right total hip replacement direct anterior approach with NIRAJ robot on 12/16 with Dr. Marina pending medical and cardiac clearance.     Problem: High cholesterol  Assessment and Plan: Continue medications as prescribed.  Medical clearance pending.    Problem: Screening for substance abuse  Assessment and Plan: ORT score 3 patient at low risk     Problem: Need for prophylactic measure  Assessment and Plan: CAP score 4 patient at moderate risk for VTE  Bilateral pneumatic compression device ordered for day of surgery.  Surgical team to evaluate alejandra for prophylactic measures.

## 2020-11-25 NOTE — H&P PST ADULT - LAB RESULTS AND INTERPRETATION
Update History & Physical    The Patient's History and Physical of July 16, 2020 was reviewed with the patient and I examined the patient. There was no change. The surgical site was confirmed by the patient and me. Plan:  The risk, benefits, expected outcome, and alternative to the recommended procedure have been discussed with the patient. Patient understands and wants to proceed with the procedure.     Electronically signed by Keven Anne MD on 7/20/2020 at 9:11 AM pending Labs reviewed abnormal values noted. Lab results faxed to PCP for review, patient is pending medical clearance. Secured email sent to Dr. Marina's GOMEZ Rene for review.

## 2020-11-25 NOTE — PATIENT PROFILE ADULT - NSPROHMSYMPCOND_GEN_A_NUR
NP, Angelina Gleason, instructed pt on pre-op instructions/teaching, tips for safer surgery, pain management scale, pre-surgical infection prevention instructions, MRSA/MSSA instructions, Covid swab appt info given, ERAS sheet, Online Joint Replacement class/book, pt verbalized understanding of all instructions given.

## 2020-11-25 NOTE — H&P PST ADULT - CONSTITUTIONAL DETAILS
well-developed/well-groomed/well-nourished/no distress well-developed/well-groomed/well-nourished/distress due to pain

## 2020-11-25 NOTE — H&P PST ADULT - NSICDXPASTMEDICALHX_GEN_ALL_CORE_FT
PAST MEDICAL HISTORY:  High cholesterol     HTN (hypertension)     Myocardial infarct Dec 2019     PAST MEDICAL HISTORY:  Back pain     CAD (coronary artery disease)     H/O heart artery stent     High cholesterol     Hip arthritis     HTN (hypertension)     Left lumbar radiculopathy     Myocardial infarct Dec 2019

## 2020-11-25 NOTE — H&P PST ADULT - NSANTHOSAYNRD_GEN_A_CORE
No. CELESTINO screening performed.  STOP BANG Legend: 0-2 = LOW Risk; 3-4 = INTERMEDIATE Risk; 5-8 = HIGH Risk

## 2020-11-25 NOTE — H&P PST ADULT - HISTORY OF PRESENT ILLNESS
65 year old   bilateral hip pain R>L  as a child diagnosed with congential hip issues, at age 5 had a body cast from mid chest to ankles for a few months. Growing up informed I would eventually need hip replacement as an older adult.   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 I live my life on pain medication    "crawls out of bed unable to walk or move until I take pain medication  was previously scheduled to have procedure jan 2020 but in dec 2019 i had heart attack.   4 months ago unable to deal with  pain became severe enough and started affecting my ADLS, mobility  describes pain as constant 8/10 without pain medication with pain medication 3/10sharp achy pain radiates on occasion down leg to knee  sometimes when ambulating and turning hip gets stuck   aggravating factor driving , sitting too long, prolonged standing, donning and doffing socks and shoes.  alleviating pain medication, ice pack,  pain management Dr. Vogt recommended cortisone injections but I was really not onboard with that process.  x-ray and ct scan bone on bone      65 year old  male with PMH HTN, high cholesterol, CAD and MI with 2 stents (Dec. 2019) present today for PST. Patient c/o bilateral hip pain R>L for several years due to congential hip dysplasia. States pain in right hip become progressively worse over the last 4 months, and has severely affected his ADLs and mobility.  Describes pain as constant, sharp and achy 8/10 without pain medication that radiates on occasion down leg to knee. States pain medication helps bring pain level down 3/10.  Patient states he has to "I crawl out of bed every morning and unable to walk or move until I take my pain medication". Aggravating factors- driving, sitting too long, prolonged standing, donning and doffing socks and shoes. Alleviating factors- pain medication and ice to joint.  States as a child diagnosed with congential hip dysplasia, and at age 5 had a body cast from mid chest to ankles for a few months. States growing up informed he would eventually need hip replacement as an older adult. 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 States  "I live my life on pain medication and I hate it". He is followed by pain management and Dr. Vogt recommended cortisone injections but patient refused.  He was previously scheduled to have total hip replacement surgery January 2020 but had a heart attack December 2019 and surgery was postponed. Patient states had x-ray and CT scan which showed bone on bone.  He is now scheduled for right total hip replacement, direct anterior approach with NIRAJ robot on 12/16 with Dr. Marina pending medical and cardiology clearance.

## 2020-11-25 NOTE — H&P PST ADULT - NSICDXFAMILYHX_GEN_ALL_CORE_FT
FAMILY HISTORY:  Alcoholism in family member, brother  FH: liver cancer, brother  FH: pancreatic cancer, father

## 2020-11-25 NOTE — H&P PST ADULT - NEGATIVE ENMT SYMPTOMS
no hearing difficulty/no ear pain/no tinnitus/no vertigo/no nasal congestion/no nose bleeds/no throat pain/no dysphagia

## 2020-11-30 PROBLEM — M16.10 UNILATERAL PRIMARY OSTEOARTHRITIS, UNSPECIFIED HIP: Chronic | Status: ACTIVE | Noted: 2020-11-25

## 2020-11-30 PROBLEM — E78.00 PURE HYPERCHOLESTEROLEMIA, UNSPECIFIED: Chronic | Status: ACTIVE | Noted: 2020-11-25

## 2020-11-30 PROBLEM — Z95.5 PRESENCE OF CORONARY ANGIOPLASTY IMPLANT AND GRAFT: Chronic | Status: ACTIVE | Noted: 2020-11-25

## 2020-11-30 PROBLEM — M54.16 RADICULOPATHY, LUMBAR REGION: Chronic | Status: ACTIVE | Noted: 2020-11-25

## 2020-11-30 PROBLEM — M54.9 DORSALGIA, UNSPECIFIED: Chronic | Status: ACTIVE | Noted: 2020-11-25

## 2020-11-30 PROBLEM — I25.10 ATHEROSCLEROTIC HEART DISEASE OF NATIVE CORONARY ARTERY WITHOUT ANGINA PECTORIS: Chronic | Status: ACTIVE | Noted: 2020-11-25

## 2020-11-30 PROBLEM — I10 ESSENTIAL (PRIMARY) HYPERTENSION: Chronic | Status: ACTIVE | Noted: 2020-11-25

## 2020-12-02 ENCOUNTER — LABORATORY RESULT (OUTPATIENT)
Age: 65
End: 2020-12-02

## 2020-12-02 ENCOUNTER — APPOINTMENT (OUTPATIENT)
Dept: PHYSICAL MEDICINE AND REHAB | Facility: CLINIC | Age: 65
End: 2020-12-02
Payer: MEDICAID

## 2020-12-02 PROCEDURE — 99213 OFFICE O/P EST LOW 20 MIN: CPT

## 2020-12-02 PROCEDURE — 99072 ADDL SUPL MATRL&STAF TM PHE: CPT

## 2020-12-02 NOTE — ASSESSMENT
[FreeTextEntry1] : 65 y.o. M w/ h/o chronic right hip pain 2' advanced b/l femoroacetabular OA w/ relatively new onset HF w/ low EF (improved) s/p PCI and stent x2 doing well.  Regarding his last urine tox screen, we will repeat it today as he states that he took one tab this AM and two doses yesterday.  If tox screen is + for oxycodone or metabolites, will reissue Rx.   I-STOP DATABASE CHECKED.   I again reviewed R/B/A of narcotic analgesics including but not limited to respiratory depression, CNS depression, N/V/C, physiological and psychological addiction and untimely death if not used as directed.  Pt. was again instructed not to take narcotic analgesics w/ benzodiazepines (prescribed from his PMD).  Pt. also understands that NSAIDs remain absolutely contraindicated given his cardiac history.  Reviewed HEP.   Will eRx next week and pt. will RTC 2 weeks for f/u.  \par

## 2020-12-02 NOTE — HISTORY OF PRESENT ILLNESS
[FreeTextEntry1] : 65 y.o. M w/ h/o chronic hip pain 2' advanced b/l femoroacetabular OA w/ relatively new onset HF w/ low EF (improved) s/p PCI and stent x2 returns to office for f/u.  Pt. missed last appt. as he had to quarantine 2' COVID exposure.  The patient's most recent urine toxicology report dated 11/4/20 was negative for opiates, specifically oxycodone.  An Rx for 21 tabs oxycodone 10 mg was dispensed on 11/4.  Prior Rx dispensed on 10/27 again for 214 tabs.  If taken tid as directed, pt. would not have had any pills on 11/3 and AM of 11/4.  Pt. states that he does not recall if he missed dose or two prior to urine tox screen.  There have been times when he has been short pills b/c he needed to take 4 tabs/day.  Pt's has R NIKKO scheduled for 12/16.

## 2020-12-02 NOTE — DATA REVIEWED
[FreeTextEntry1] : MRI L-spine results. No significant HNP, stenosis, NF narrowing or BM lesion. \par \par X-rays pelvis and b/l hips reviewed w/ patient: c/w advanced L > R femoroactebular OA w/ decreased joint space and osteophytes.  Gun stock deformities.  Adequate coverage femoral head - no evidence of acetabular dysplasia.

## 2020-12-04 ENCOUNTER — APPOINTMENT (OUTPATIENT)
Dept: PHYSICAL MEDICINE AND REHAB | Facility: CLINIC | Age: 65
End: 2020-12-04

## 2020-12-08 ENCOUNTER — APPOINTMENT (OUTPATIENT)
Dept: FAMILY MEDICINE | Facility: CLINIC | Age: 65
End: 2020-12-08
Payer: MEDICAID

## 2020-12-08 VITALS
TEMPERATURE: 96.1 F | WEIGHT: 195 LBS | HEIGHT: 70 IN | DIASTOLIC BLOOD PRESSURE: 94 MMHG | HEART RATE: 65 BPM | BODY MASS INDEX: 27.92 KG/M2 | OXYGEN SATURATION: 95 % | SYSTOLIC BLOOD PRESSURE: 150 MMHG

## 2020-12-08 DIAGNOSIS — M16.10 UNILATERAL PRIMARY OSTEOARTHRITIS, UNSPECIFIED HIP: ICD-10-CM

## 2020-12-08 LAB
7 AMINOCLONAZEPAM UR QUANT: NOT DETECTED NG/MG CREAT
ALPHA HYDROXYALPRAZOLAM UR QUANT: 16 NG/MG CREAT
ALPHA HYDROXYTRIAZOLAM UR QUANT: NOT DETECTED NG/MG CREAT
ALPRAZOLAM UR QUANT: 10 NG/MG CREAT
AMPHET UR-MCNC: NEGATIVE
BARBITURATES UR-MCNC: NEGATIVE
BENZODIAZ UR-MCNC: NEGATIVE
BENZODIAZEPINES UR CONFIRMATION: ABNORMAL
CLONAZEPAM UR QUANT: NOT DETECTED NG/MG CREAT
COCAINE METAB.OTHER UR-MCNC: NEGATIVE
CREATININE, URINE: 212.9 MG/DL
DESALKYLFLURAZEPAM UR QUANT: NOT DETECTED NG/MG CREAT
DESMETHYLDIAZEPAM UR QUANT: NOT DETECTED NG/MG CREAT
DIAZEPAM UR QUANT: NOT DETECTED NG/MG CREAT
LORAZEPAM UR QUANT: NOT DETECTED NG/MG CREAT
METHADONE UR-MCNC: NEGATIVE
METHAQUALONE UR-MCNC: NEGATIVE
MIDAZOLAM UR QUANT: NOT DETECTED NG/MG CREAT
OPIATES UR-MCNC: NEGATIVE
OXAZEPAM UR QUANT: NOT DETECTED NG/MG CREAT
PCP UR-MCNC: NEGATIVE
PROPOXYPH UR QL: NEGATIVE
TEMAZEPAM UR QUANT: NOT DETECTED NG/MG CREAT
THC UR QL: NEGATIVE

## 2020-12-08 PROCEDURE — 99214 OFFICE O/P EST MOD 30 MIN: CPT

## 2020-12-08 PROCEDURE — 99072 ADDL SUPL MATRL&STAF TM PHE: CPT

## 2020-12-10 ENCOUNTER — NON-APPOINTMENT (OUTPATIENT)
Age: 65
End: 2020-12-10

## 2020-12-10 PROBLEM — M16.10 HIP ARTHRITIS: Status: ACTIVE | Noted: 2018-09-06

## 2020-12-10 NOTE — HISTORY OF PRESENT ILLNESS
[Coronary Artery Disease] : coronary artery disease [No Pertinent Pulmonary History] : no history of asthma, COPD, sleep apnea, or smoking [No Adverse Anesthesia Reaction] : no adverse anesthesia reaction in self or family member [Chronic Anticoagulation] : chronic anticoagulation [(Patient denies any chest pain, claudication, dyspnea on exertion, orthopnea, palpitations or syncope)] : Patient denies any chest pain, claudication, dyspnea on exertion, orthopnea, palpitations or syncope [Aortic Stenosis] : no aortic stenosis [Atrial Fibrillation] : no atrial fibrillation [Recent Myocardial Infarction] : no recent myocardial infarction [Implantable Device/Pacemaker] : no implantable device/pacemaker [Chronic Kidney Disease] : no chronic kidney disease [Diabetes] : no diabetes [FreeTextEntry1] : Rt Hip Replacement [FreeTextEntry2] : 12-16-20 [FreeTextEntry3] : Dr Marina [FreeTextEntry4] : hip surgery on 12-16-20 here for clearance [FreeTextEntry5] : SEE CARDIOLOGY NOTE AND CLEARANCE [FreeTextEntry7] : SEE ATTACHED

## 2020-12-10 NOTE — ASSESSMENT
[Patient Optimized for Surgery] : Patient optimized for surgery [Continue anticoagulant treatment as is] : Continue current anticoagulant treatment [Continue anti-platelet treatment as is] : Continue current anti-platelet treatment [Continue medications as is] : Continue current medications [As per surgery] : as per surgery [FreeTextEntry4] : PT IS CLEARED BY CARDIOLOGY

## 2020-12-11 ENCOUNTER — NON-APPOINTMENT (OUTPATIENT)
Age: 65
End: 2020-12-11

## 2020-12-13 ENCOUNTER — APPOINTMENT (OUTPATIENT)
Dept: DISASTER EMERGENCY | Facility: CLINIC | Age: 65
End: 2020-12-13

## 2020-12-13 LAB
OXYCODONE, URINE: 1228 NG/ML
OXYMORPHONE, URINE: 1576 NG/ML

## 2020-12-15 ENCOUNTER — TRANSCRIPTION ENCOUNTER (OUTPATIENT)
Age: 65
End: 2020-12-15

## 2020-12-15 LAB — SARS-COV-2 N GENE NPH QL NAA+PROBE: NOT DETECTED

## 2020-12-16 ENCOUNTER — TRANSCRIPTION ENCOUNTER (OUTPATIENT)
Age: 65
End: 2020-12-16

## 2020-12-16 ENCOUNTER — INPATIENT (INPATIENT)
Facility: HOSPITAL | Age: 65
LOS: 0 days | Discharge: HOME | DRG: 470 | End: 2020-12-17
Attending: ORTHOPAEDIC SURGERY | Admitting: ORTHOPAEDIC SURGERY
Payer: MEDICAID

## 2020-12-16 ENCOUNTER — APPOINTMENT (OUTPATIENT)
Dept: ORTHOPEDIC SURGERY | Facility: HOSPITAL | Age: 65
End: 2020-12-16

## 2020-12-16 VITALS
OXYGEN SATURATION: 97 % | TEMPERATURE: 98 F | DIASTOLIC BLOOD PRESSURE: 89 MMHG | SYSTOLIC BLOOD PRESSURE: 153 MMHG | HEART RATE: 71 BPM | WEIGHT: 202.83 LBS | HEIGHT: 70 IN | RESPIRATION RATE: 16 BRPM

## 2020-12-16 DIAGNOSIS — I25.10 ATHEROSCLEROTIC HEART DISEASE OF NATIVE CORONARY ARTERY WITHOUT ANGINA PECTORIS: ICD-10-CM

## 2020-12-16 DIAGNOSIS — Z95.5 PRESENCE OF CORONARY ANGIOPLASTY IMPLANT AND GRAFT: Chronic | ICD-10-CM

## 2020-12-16 DIAGNOSIS — M16.11 UNILATERAL PRIMARY OSTEOARTHRITIS, RIGHT HIP: ICD-10-CM

## 2020-12-16 DIAGNOSIS — M54.9 DORSALGIA, UNSPECIFIED: ICD-10-CM

## 2020-12-16 DIAGNOSIS — Z98.890 OTHER SPECIFIED POSTPROCEDURAL STATES: Chronic | ICD-10-CM

## 2020-12-16 LAB
ABO RH CONFIRMATION: SIGNIFICANT CHANGE UP
GLUCOSE BLDC GLUCOMTR-MCNC: 110 MG/DL — HIGH (ref 70–99)
GLUCOSE BLDC GLUCOMTR-MCNC: 137 MG/DL — HIGH (ref 70–99)

## 2020-12-16 PROCEDURE — 0055T BONE SRGRY CMPTR CT/MRI IMAG: CPT

## 2020-12-16 PROCEDURE — 73501 X-RAY EXAM HIP UNI 1 VIEW: CPT | Mod: 26,RT

## 2020-12-16 PROCEDURE — 27130 TOTAL HIP ARTHROPLASTY: CPT | Mod: AS,RT

## 2020-12-16 PROCEDURE — 27130 TOTAL HIP ARTHROPLASTY: CPT | Mod: RT

## 2020-12-16 PROCEDURE — 99222 1ST HOSP IP/OBS MODERATE 55: CPT

## 2020-12-16 RX ORDER — CEFAZOLIN SODIUM 1 G
2000 VIAL (EA) INJECTION ONCE
Refills: 0 | Status: DISCONTINUED | OUTPATIENT
Start: 2020-12-16 | End: 2020-12-16

## 2020-12-16 RX ORDER — ACETAMINOPHEN 500 MG
975 TABLET ORAL EVERY 8 HOURS
Refills: 0 | Status: DISCONTINUED | OUTPATIENT
Start: 2020-12-16 | End: 2020-12-17

## 2020-12-16 RX ORDER — AMLODIPINE BESYLATE 2.5 MG/1
5 TABLET ORAL DAILY
Refills: 0 | Status: DISCONTINUED | OUTPATIENT
Start: 2020-12-18 | End: 2020-12-17

## 2020-12-16 RX ORDER — SACUBITRIL AND VALSARTAN 24; 26 MG/1; MG/1
1 TABLET, FILM COATED ORAL
Refills: 0 | Status: DISCONTINUED | OUTPATIENT
Start: 2020-12-18 | End: 2020-12-17

## 2020-12-16 RX ORDER — ACETAMINOPHEN 500 MG
975 TABLET ORAL ONCE
Refills: 0 | Status: COMPLETED | OUTPATIENT
Start: 2020-12-16 | End: 2020-12-16

## 2020-12-16 RX ORDER — SODIUM CHLORIDE 9 MG/ML
1000 INJECTION, SOLUTION INTRAVENOUS
Refills: 0 | Status: DISCONTINUED | OUTPATIENT
Start: 2020-12-16 | End: 2020-12-17

## 2020-12-16 RX ORDER — OXYCODONE HYDROCHLORIDE 5 MG/1
10 TABLET ORAL
Refills: 0 | Status: DISCONTINUED | OUTPATIENT
Start: 2020-12-16 | End: 2020-12-17

## 2020-12-16 RX ORDER — KETOROLAC TROMETHAMINE 30 MG/ML
15 SYRINGE (ML) INJECTION EVERY 6 HOURS
Refills: 0 | Status: DISCONTINUED | OUTPATIENT
Start: 2020-12-16 | End: 2020-12-17

## 2020-12-16 RX ORDER — ATORVASTATIN CALCIUM 80 MG/1
80 TABLET, FILM COATED ORAL AT BEDTIME
Refills: 0 | Status: DISCONTINUED | OUTPATIENT
Start: 2020-12-16 | End: 2020-12-17

## 2020-12-16 RX ORDER — SODIUM CHLORIDE 9 MG/ML
500 INJECTION INTRAMUSCULAR; INTRAVENOUS; SUBCUTANEOUS ONCE
Refills: 0 | Status: COMPLETED | OUTPATIENT
Start: 2020-12-16 | End: 2020-12-16

## 2020-12-16 RX ORDER — APREPITANT 80 MG/1
40 CAPSULE ORAL ONCE
Refills: 0 | Status: COMPLETED | OUTPATIENT
Start: 2020-12-16 | End: 2020-12-16

## 2020-12-16 RX ORDER — ONDANSETRON 8 MG/1
4 TABLET, FILM COATED ORAL EVERY 6 HOURS
Refills: 0 | Status: DISCONTINUED | OUTPATIENT
Start: 2020-12-16 | End: 2020-12-17

## 2020-12-16 RX ORDER — SENNA PLUS 8.6 MG/1
2 TABLET ORAL AT BEDTIME
Refills: 0 | Status: DISCONTINUED | OUTPATIENT
Start: 2020-12-16 | End: 2020-12-17

## 2020-12-16 RX ORDER — ASPIRIN/CALCIUM CARB/MAGNESIUM 324 MG
325 TABLET ORAL
Refills: 0 | Status: DISCONTINUED | OUTPATIENT
Start: 2020-12-17 | End: 2020-12-17

## 2020-12-16 RX ORDER — HYDROMORPHONE HYDROCHLORIDE 2 MG/ML
0.5 INJECTION INTRAMUSCULAR; INTRAVENOUS; SUBCUTANEOUS
Refills: 0 | Status: DISCONTINUED | OUTPATIENT
Start: 2020-12-16 | End: 2020-12-16

## 2020-12-16 RX ORDER — MAGNESIUM HYDROXIDE 400 MG/1
30 TABLET, CHEWABLE ORAL DAILY
Refills: 0 | Status: DISCONTINUED | OUTPATIENT
Start: 2020-12-16 | End: 2020-12-17

## 2020-12-16 RX ORDER — ONDANSETRON 8 MG/1
4 TABLET, FILM COATED ORAL ONCE
Refills: 0 | Status: DISCONTINUED | OUTPATIENT
Start: 2020-12-16 | End: 2020-12-16

## 2020-12-16 RX ORDER — METOPROLOL TARTRATE 50 MG
25 TABLET ORAL DAILY
Refills: 0 | Status: DISCONTINUED | OUTPATIENT
Start: 2020-12-16 | End: 2020-12-17

## 2020-12-16 RX ORDER — HYDROMORPHONE HYDROCHLORIDE 2 MG/ML
0.5 INJECTION INTRAMUSCULAR; INTRAVENOUS; SUBCUTANEOUS EVERY 4 HOURS
Refills: 0 | Status: DISCONTINUED | OUTPATIENT
Start: 2020-12-16 | End: 2020-12-16

## 2020-12-16 RX ORDER — ALPRAZOLAM 0.25 MG
0.5 TABLET ORAL AT BEDTIME
Refills: 0 | Status: DISCONTINUED | OUTPATIENT
Start: 2020-12-16 | End: 2020-12-17

## 2020-12-16 RX ORDER — CELECOXIB 200 MG/1
400 CAPSULE ORAL ONCE
Refills: 0 | Status: COMPLETED | OUTPATIENT
Start: 2020-12-16 | End: 2020-12-16

## 2020-12-16 RX ORDER — OXYCODONE HYDROCHLORIDE 5 MG/1
5 TABLET ORAL
Refills: 0 | Status: DISCONTINUED | OUTPATIENT
Start: 2020-12-16 | End: 2020-12-17

## 2020-12-16 RX ORDER — HYDROMORPHONE HYDROCHLORIDE 2 MG/ML
4 INJECTION INTRAMUSCULAR; INTRAVENOUS; SUBCUTANEOUS
Refills: 0 | Status: DISCONTINUED | OUTPATIENT
Start: 2020-12-16 | End: 2020-12-17

## 2020-12-16 RX ORDER — ACETAMINOPHEN 500 MG
1000 TABLET ORAL
Refills: 0 | Status: COMPLETED | OUTPATIENT
Start: 2020-12-16 | End: 2020-12-16

## 2020-12-16 RX ORDER — SODIUM CHLORIDE 9 MG/ML
1000 INJECTION, SOLUTION INTRAVENOUS
Refills: 0 | Status: DISCONTINUED | OUTPATIENT
Start: 2020-12-16 | End: 2020-12-16

## 2020-12-16 RX ORDER — THIAMINE MONONITRATE (VIT B1) 100 MG
100 TABLET ORAL ONCE
Refills: 0 | Status: DISCONTINUED | OUTPATIENT
Start: 2020-12-16 | End: 2020-12-17

## 2020-12-16 RX ORDER — PANTOPRAZOLE SODIUM 20 MG/1
40 TABLET, DELAYED RELEASE ORAL
Refills: 0 | Status: DISCONTINUED | OUTPATIENT
Start: 2020-12-16 | End: 2020-12-17

## 2020-12-16 RX ORDER — POLYETHYLENE GLYCOL 3350 17 G/17G
17 POWDER, FOR SOLUTION ORAL AT BEDTIME
Refills: 0 | Status: DISCONTINUED | OUTPATIENT
Start: 2020-12-16 | End: 2020-12-17

## 2020-12-16 RX ORDER — CELECOXIB 200 MG/1
200 CAPSULE ORAL
Refills: 0 | Status: DISCONTINUED | OUTPATIENT
Start: 2020-12-17 | End: 2020-12-17

## 2020-12-16 RX ORDER — HYDROMORPHONE HYDROCHLORIDE 2 MG/ML
0.5 INJECTION INTRAMUSCULAR; INTRAVENOUS; SUBCUTANEOUS EVERY 4 HOURS
Refills: 0 | Status: DISCONTINUED | OUTPATIENT
Start: 2020-12-16 | End: 2020-12-17

## 2020-12-16 RX ORDER — CEFAZOLIN SODIUM 1 G
2000 VIAL (EA) INJECTION
Refills: 0 | Status: COMPLETED | OUTPATIENT
Start: 2020-12-16 | End: 2020-12-17

## 2020-12-16 RX ORDER — CLOPIDOGREL BISULFATE 75 MG/1
75 TABLET, FILM COATED ORAL DAILY
Refills: 0 | Status: DISCONTINUED | OUTPATIENT
Start: 2020-12-17 | End: 2020-12-17

## 2020-12-16 RX ADMIN — ATORVASTATIN CALCIUM 80 MILLIGRAM(S): 80 TABLET, FILM COATED ORAL at 21:26

## 2020-12-16 RX ADMIN — HYDROMORPHONE HYDROCHLORIDE 0.5 MILLIGRAM(S): 2 INJECTION INTRAMUSCULAR; INTRAVENOUS; SUBCUTANEOUS at 18:35

## 2020-12-16 RX ADMIN — APREPITANT 40 MILLIGRAM(S): 80 CAPSULE ORAL at 07:31

## 2020-12-16 RX ADMIN — Medication 15 MILLIGRAM(S): at 19:14

## 2020-12-16 RX ADMIN — HYDROMORPHONE HYDROCHLORIDE 4 MILLIGRAM(S): 2 INJECTION INTRAMUSCULAR; INTRAVENOUS; SUBCUTANEOUS at 23:01

## 2020-12-16 RX ADMIN — Medication 1000 MILLIGRAM(S): at 21:26

## 2020-12-16 RX ADMIN — Medication 100 MILLIGRAM(S): at 18:30

## 2020-12-16 RX ADMIN — POLYETHYLENE GLYCOL 3350 17 GRAM(S): 17 POWDER, FOR SOLUTION ORAL at 21:27

## 2020-12-16 RX ADMIN — Medication 975 MILLIGRAM(S): at 21:25

## 2020-12-16 RX ADMIN — Medication 15 MILLIGRAM(S): at 23:01

## 2020-12-16 RX ADMIN — HYDROMORPHONE HYDROCHLORIDE 0.5 MILLIGRAM(S): 2 INJECTION INTRAMUSCULAR; INTRAVENOUS; SUBCUTANEOUS at 13:56

## 2020-12-16 RX ADMIN — Medication 15 MILLIGRAM(S): at 17:58

## 2020-12-16 RX ADMIN — CELECOXIB 400 MILLIGRAM(S): 200 CAPSULE ORAL at 07:31

## 2020-12-16 RX ADMIN — Medication 400 MILLIGRAM(S): at 21:26

## 2020-12-16 RX ADMIN — HYDROMORPHONE HYDROCHLORIDE 4 MILLIGRAM(S): 2 INJECTION INTRAMUSCULAR; INTRAVENOUS; SUBCUTANEOUS at 20:49

## 2020-12-16 RX ADMIN — HYDROMORPHONE HYDROCHLORIDE 0.5 MILLIGRAM(S): 2 INJECTION INTRAMUSCULAR; INTRAVENOUS; SUBCUTANEOUS at 14:26

## 2020-12-16 RX ADMIN — HYDROMORPHONE HYDROCHLORIDE 0.5 MILLIGRAM(S): 2 INJECTION INTRAMUSCULAR; INTRAVENOUS; SUBCUTANEOUS at 18:45

## 2020-12-16 RX ADMIN — SODIUM CHLORIDE 3 MILLILITER(S): 9 INJECTION INTRAMUSCULAR; INTRAVENOUS; SUBCUTANEOUS at 21:28

## 2020-12-16 RX ADMIN — Medication 15 MILLIGRAM(S): at 23:05

## 2020-12-16 RX ADMIN — HYDROMORPHONE HYDROCHLORIDE 4 MILLIGRAM(S): 2 INJECTION INTRAMUSCULAR; INTRAVENOUS; SUBCUTANEOUS at 19:49

## 2020-12-16 RX ADMIN — OXYCODONE HYDROCHLORIDE 10 MILLIGRAM(S): 5 TABLET ORAL at 16:02

## 2020-12-16 RX ADMIN — SENNA PLUS 2 TABLET(S): 8.6 TABLET ORAL at 21:25

## 2020-12-16 RX ADMIN — HYDROMORPHONE HYDROCHLORIDE 0.5 MILLIGRAM(S): 2 INJECTION INTRAMUSCULAR; INTRAVENOUS; SUBCUTANEOUS at 15:52

## 2020-12-16 RX ADMIN — Medication 975 MILLIGRAM(S): at 07:31

## 2020-12-16 RX ADMIN — SODIUM CHLORIDE 1000 MILLILITER(S): 9 INJECTION INTRAMUSCULAR; INTRAVENOUS; SUBCUTANEOUS at 13:29

## 2020-12-16 RX ADMIN — SODIUM CHLORIDE 150 MILLILITER(S): 9 INJECTION, SOLUTION INTRAVENOUS at 23:00

## 2020-12-16 NOTE — PROGRESS NOTE ADULT - SUBJECTIVE AND OBJECTIVE BOX
Spoke with Jo Ann at [facility] - Dr Kent (resident) approves on behalf of Dr Pavon DVTP regimen of Plavix 75 mg daily and  mg bid daily for 2 weeks, then return to patient home medications (brillinta 90mg bid and asa 81 mg daily).

## 2020-12-16 NOTE — PHYSICAL THERAPY INITIAL EVALUATION ADULT - GENERAL OBSERVATIONS, REHAB EVAL
Pt received in semi ivory position in bed. NAD and agreeable to PT eval. (+) IV Right arm (+) Heart monitor (+) Dain LE compression pumps.

## 2020-12-16 NOTE — CONSULT NOTE ADULT - SUBJECTIVE AND OBJECTIVE BOX
PMD : Luciano  Cardio : Kathy     Patient is a 65y old  Male who is s/p R NIKKO  robot assisted , POD # 0 , tolerated procedure well . Seen and examined on PACU .     CC: R hip chronic pain       HPI:  65 year old  male with PMH HTN, high cholesterol, CAD and MI with 2 stents (Dec. 2019) .  Patient c/o bilateral hip pain R>L for several years due to congential hip dysplasia. States pain in right hip become progressively worse over the last 4 months, and has severely affected his ADLs and mobility , refused  cortisone injections in the past   He was previously scheduled to have total hip replacement surgery January 2020 but had a heart attack December 2019 and surgery was postponed. Patient states had x-ray and CT scan which showed bone on bone.  Was taking Oxycodone for pain . Now s/p R NIKKO .            (25 Nov 2020 11:02)      PAST MEDICAL & SURGICAL HISTORY:  Hip arthritis    H/O heart artery stent    CAD (coronary artery disease)    Back pain    Left lumbar radiculopathy    Myocardial infarct  Dec 2019    High cholesterol    HTN (hypertension)    Presence of stent in coronary artery  2 stents Dec 2019    H/O hernia repair  inquinal        Social History:  Tobacco - ex smoker , quit 1 yr ago   ETOH - occasionally   Illicit drug abuse - denies    FAMILY HISTORY:  Alcoholism in family member  brother    FH: liver cancer  brother    FH: pancreatic cancer  father        Allergies    No Known Allergies    Intolerances        HOME MEDICATIONS :   · 	metoprolol tartrate 25 mg oral tablet: Last Dose Taken:  , 1 tab(s) orally once a day  · 	amLODIPine 5 mg oral tablet: Last Dose Taken:  , 1 tab(s) orally once a day  · 	atorvastatin 80 mg oral tablet: Last Dose Taken:  , 1 tab(s) orally once a day  · 	Entresto 49 mg-51 mg oral tablet: Last Dose Taken:  , 1 tab(s) orally 2 times a day  · 	Brilinta (ticagrelor) 90 mg oral tablet: Last Dose Taken:  , 1 tab(s) orally 2 times a day  · 	oxyCODONE 10 mg oral tablet: Last Dose Taken:  , 1 tab(s) orally 3 times  · 	ALPRAZolam 0.5 mg oral tablet: Last Dose Taken:  , 1 tab(s) orally once (at bedtime), As Needed    REVIEW OF SYSTEMS:    R hip chronic pain , all other systems are reviewed and are negative .     MEDICATIONS  (STANDING):  lactated ringers. 1000 milliLiter(s) (75 mL/Hr) IV Continuous <Continuous>  thiamine Injectable 100 milliGRAM(s) IntraMuscular once    MEDICATIONS  (PRN):  HYDROmorphone  Injectable 0.5 milliGRAM(s) IV Push every 10 minutes PRN Moderate Pain (4 - 6)  LORazepam   Injectable 2 milliGRAM(s) IV Push every 2 hours PRN CIWA-Ar score increase by 2 points and a total score of 7 or less  ondansetron Injectable 4 milliGRAM(s) IV Push once PRN Nausea and/or Vomiting      Vital Signs Last 24 Hrs  T(C): 36.1 (16 Dec 2020 13:39), Max: 36.6 (16 Dec 2020 06:49)  T(F): 97 (16 Dec 2020 13:39), Max: 97.8 (16 Dec 2020 06:49)  HR: 54 (16 Dec 2020 14:15) (54 - 71)  BP: 143/77 (16 Dec 2020 14:15) (104/45 - 153/89)  BP(mean): --  RR: 12 (16 Dec 2020 14:15) (10 - 16)  SpO2: 100% (16 Dec 2020 14:15) (97% - 100%)    PHYSICAL EXAM:    GENERAL: NAD, well-groomed, well-developed  HEAD:  Atraumatic, Normocephalic  EYES: EOMI, PERRLA, conjunctiva and sclera clear  NECK: Supple, No JVD, Normal thyroid  NERVOUS SYSTEM:  Alert & Oriented X3, no focal deficit   CHEST/LUNG: CTA  b/l,  no rales, rhonchi, wheezing, or rubs  HEART: Regular rate and rhythm; No murmurs, rubs, or gallops  ABDOMEN: Soft, Nontender, Nondistended; Bowel sounds present  EXTREMITIES:  2+ Peripheral Pulses, No clubbing, cyanosis, or edema , R hip dressing + , clean and dry   LYMPH: No lymphadenopathy noted  SKIN: No rashes or lesions    LABS: Pending     RADIOLOGY & ADDITIONAL STUDIES:    < from: CT Hip No Cont, Right (10.19.20 @ 13:22) >    EXAM:  CT HIP ONLY RT        PROCEDURE DATE:  10/19/2020           INTERPRETATION:  CT OF THE RIGHT HIP WITHOUT CONTRAST.    CLINICAL INFORMATION: Right hip pain. Plan for arthroplasty.  TECHNIQUE: Axial CT images were obtained of the pelvis and right hip with coronal and sagittal reconstructions. Imaging was performed as per NIRAJ protocol.    FINDINGS:    There is moderate to severe bilateral hip osteoarthrosis with joint space narrowing, sclerosis and osteophyte formation on both sides of each articulation. Small joint effusions are present.    Partially imaged knee articulations are intact.    There is degenerative disease at L5-S1 with minimal degenerative change of the SI joints    Axial calcification is noted.    IMPRESSION:    Moderate to severe bilateral hip osteoarthrosis. Imaging performed as per NIRAJ protocol.               NAYELY MCDOWELL M.D., ATTENDING RADIOLOGIST Phone #: [phone]  This document has been electronically signed. Oct 20 2020 11:09AM    < end of copied text >       PMD : Luciano  Cardio : Kathy     Patient is a 65y old  Male who is s/p R NIKKO  robot assisted , POD # 0 , tolerated procedure well . Seen and examined on PACU .     CC: R hip chronic pain       HPI:  65 year old  male with PMH HTN, high cholesterol, CAD and MI with 2 stents (Dec. 2019)., Anxiety  .  Patient c/o bilateral hip pain R>L for several years due to congential hip dysplasia. States pain in right hip become progressively worse over the last 4 months, and has severely affected his ADLs and mobility , refused  cortisone injections in the past   He was previously scheduled to have total hip replacement surgery January 2020 but had a heart attack December 2019 and surgery was postponed. Patient states had x-ray and CT scan which showed bone on bone.  Was taking Oxycodone for pain . Now s/p R NIKKO .            (25 Nov 2020 11:02)      PAST MEDICAL & SURGICAL HISTORY:  Hip arthritis    H/O heart artery stent    CAD (coronary artery disease)    Back pain    Left lumbar radiculopathy    Myocardial infarct  Dec 2019    High cholesterol    HTN (hypertension)    Presence of stent in coronary artery  2 stents Dec 2019    H/O hernia repair  inquinal        Social History:  Tobacco - ex smoker , quit 1 yr ago   ETOH - occasionally   Illicit drug abuse - denies    FAMILY HISTORY:  Alcoholism in family member  brother    FH: liver cancer  brother    FH: pancreatic cancer  father        Allergies    No Known Allergies    Intolerances        HOME MEDICATIONS :   .             · 	metoprolol succinate ER 25 mg oral tablet: Last Dose Taken:  , 1 tab(s) orally once a day  · 	amLODIPine 5 mg oral tablet: Last Dose Taken:  , 1 tab(s) orally once a day  · 	atorvastatin 80 mg oral tablet: Last Dose Taken:  , 1 tab(s) orally once a day  · 	Entresto 49 mg-51 mg oral tablet: Last Dose Taken:  , 1 tab(s) orally 2 times a day  · 	Brilinta (ticagrelor) 90 mg oral tablet: Last Dose Taken:  , 1 tab(s) orally 2 times a day  · 	oxyCODONE 10 mg oral tablet: Last Dose Taken:  , 1 tab(s) orally 3 times  · 	ALPRAZolam 0.5 mg oral tablet: Last Dose Taken:  , 1 tab(s) orally once (at bedtime), As Needed    REVIEW OF SYSTEMS:    R hip chronic pain , all other systems are reviewed and are negative .     MEDICATIONS  (STANDING):  lactated ringers. 1000 milliLiter(s) (75 mL/Hr) IV Continuous <Continuous>  thiamine Injectable 100 milliGRAM(s) IntraMuscular once    MEDICATIONS  (PRN):  HYDROmorphone  Injectable 0.5 milliGRAM(s) IV Push every 10 minutes PRN Moderate Pain (4 - 6)  LORazepam   Injectable 2 milliGRAM(s) IV Push every 2 hours PRN CIWA-Ar score increase by 2 points and a total score of 7 or less  ondansetron Injectable 4 milliGRAM(s) IV Push once PRN Nausea and/or Vomiting      Vital Signs Last 24 Hrs  T(C): 36.1 (16 Dec 2020 13:39), Max: 36.6 (16 Dec 2020 06:49)  T(F): 97 (16 Dec 2020 13:39), Max: 97.8 (16 Dec 2020 06:49)  HR: 54 (16 Dec 2020 14:15) (54 - 71)  BP: 143/77 (16 Dec 2020 14:15) (104/45 - 153/89)  BP(mean): --  RR: 12 (16 Dec 2020 14:15) (10 - 16)  SpO2: 100% (16 Dec 2020 14:15) (97% - 100%)    PHYSICAL EXAM:    GENERAL: NAD, well-groomed, well-developed  HEAD:  Atraumatic, Normocephalic  EYES: EOMI, PERRLA, conjunctiva and sclera clear  NECK: Supple, No JVD, Normal thyroid  NERVOUS SYSTEM:  Alert & Oriented X3, no focal deficit   CHEST/LUNG: CTA  b/l,  no rales, rhonchi, wheezing, or rubs  HEART: Regular rate and rhythm; No murmurs, rubs, or gallops  ABDOMEN: Soft, Nontender, Nondistended; Bowel sounds present  EXTREMITIES:  2+ Peripheral Pulses, No clubbing, cyanosis, or edema , R hip dressing + , clean and dry   LYMPH: No lymphadenopathy noted  SKIN: No rashes or lesions    LABS: Pending     RADIOLOGY & ADDITIONAL STUDIES:    < from: CT Hip No Cont, Right (10.19.20 @ 13:22) >    EXAM:  CT HIP ONLY RT        PROCEDURE DATE:  10/19/2020           INTERPRETATION:  CT OF THE RIGHT HIP WITHOUT CONTRAST.    CLINICAL INFORMATION: Right hip pain. Plan for arthroplasty.  TECHNIQUE: Axial CT images were obtained of the pelvis and right hip with coronal and sagittal reconstructions. Imaging was performed as per NIRAJ protocol.    FINDINGS:    There is moderate to severe bilateral hip osteoarthrosis with joint space narrowing, sclerosis and osteophyte formation on both sides of each articulation. Small joint effusions are present.    Partially imaged knee articulations are intact.    There is degenerative disease at L5-S1 with minimal degenerative change of the SI joints    Axial calcification is noted.    IMPRESSION:    Moderate to severe bilateral hip osteoarthrosis. Imaging performed as per NIRAJ protocol.               NAYELY MCDOWELL M.D., ATTENDING RADIOLOGIST Phone #: [phone]  This document has been electronically signed. Oct 20 2020 11:09AM    < end of copied text >

## 2020-12-16 NOTE — DISCHARGE NOTE PROVIDER - NSDCFUSCHEDAPPT_GEN_ALL_CORE_FT
WAFER, NAYELY CORTÉS ; 01/05/2021 ; [facility] [place] [facility] [address] [facility]  NAYELY ARECHIGA ; 01/26/2021 ; [facility] [place] [facility]

## 2020-12-16 NOTE — DISCHARGE NOTE PROVIDER - NSDCCPTREATMENT_GEN_ALL_CORE_FT
PRINCIPAL PROCEDURE  Procedure: Robot-assisted total replacement of hip  Findings and Treatment:

## 2020-12-16 NOTE — DISCHARGE NOTE PROVIDER - CARE PROVIDER_API CALL
Jaswnider Marina)  Orthopaedic Surgery  [address] [facility] [facility] [address]  [place] [address]  Phone: [phone]  Fax: [phone]  Follow Up Time:

## 2020-12-16 NOTE — PHYSICAL THERAPY INITIAL EVALUATION ADULT - ADDITIONAL COMMENTS
Pt lives in a house with his wife and son, who able to assist if needed. NO steps to get into the house and 12 steps with in the house (+) sary rails. PT doesn't own no DME. Pt was independent prior admission.

## 2020-12-16 NOTE — DISCHARGE NOTE PROVIDER - NSDCCPCAREPLAN_GEN_ALL_CORE_FT
PRINCIPAL DISCHARGE DIAGNOSIS  Diagnosis: Unilateral primary osteoarthritis, right hip  Assessment and Plan of Treatment:       SECONDARY DISCHARGE DIAGNOSES  Diagnosis: Unilateral primary osteoarthritis, right hip  Assessment and Plan of Treatment:

## 2020-12-16 NOTE — PHYSICAL THERAPY INITIAL EVALUATION ADULT - PERTINENT HX OF CURRENT PROBLEM, REHAB EVAL
Pt is a 64y/o M who presented to [facility] due to " I am having my right hip done". PMH HTN, high cholesterol, CAD and MI with 2 stents (Dec. 2019)

## 2020-12-16 NOTE — CONSULT NOTE ADULT - PROBLEM SELECTOR RECOMMENDATION 6
DVT prophylaxis  - as per ortho protocol  Opioid induced constipation  prophylaxis - bowel regimen DVT prophylaxis  - as per ortho protocol  Opioid induced constipation  prophylaxis - bowel regimen    Problem / Plan - 7: Anxiety - continue Xanax PRN

## 2020-12-16 NOTE — DISCHARGE NOTE PROVIDER - HOSPITAL COURSE
The patient underwent a RIGHT ANTERIOR TOTAL HIP REPLACEMENT on 12/16/2020. The patient received antibiotics consistent with SCIP guidelines. The patient underwent the procedure and had no intra-operative complications. Post-operatively, the patient was seen by medicine and PT. The patient received ASPIRIN for DVTP. The patient received pain medications per orthopedic pain management pathway and the pain was appropriately controlled. Patient was instructed on anterior total hip precautions by PT. The patient did not have any post-operative medical complications. The patient was discharged in stable condition. The patient underwent a RIGHT ANTERIOR TOTAL HIP REPLACEMENT on 12/16/2020. The patient received antibiotics consistent with SCIP guidelines. The patient underwent the procedure and had no intra-operative complications. Post-operatively, the patient was seen by medicine and PT. The patient received ASPIRIN for DVTP. The patient received pain medications per orthopedic pain management pathway and the pain was appropriately controlled. Patient was instructed on anterior total hip precautions by PT. The patient had urinary retention after surgery, which was treated and passed TOV before discharge. The patient was discharged in stable condition.

## 2020-12-16 NOTE — PHYSICAL THERAPY INITIAL EVALUATION ADULT - PRECAUTIONS/LIMITATIONS, REHAB EVAL
fall precautions/right hip precautions/seizure precautions anterior(Dr. Bailey protocol)/fall precautions/right hip precautions/seizure precautions

## 2020-12-16 NOTE — DISCHARGE NOTE PROVIDER - NSDCMRMEDTOKEN_GEN_ALL_CORE_FT
ALPRAZolam 0.5 mg oral tablet: 1 tab(s) orally once (at bedtime), As Needed  amLODIPine 5 mg oral tablet: 1 tab(s) orally once a day  atorvastatin 80 mg oral tablet: 1 tab(s) orally once a day  Brilinta (ticagrelor) 90 mg oral tablet: 1 tab(s) orally 2 times a day  Entresto 49 mg-51 mg oral tablet: 1 tab(s) orally 2 times a day  metoprolol tartrate 25 mg oral tablet: 1 tab(s) orally once a day  oxyCODONE 10 mg oral tablet: 1 tab(s) orally 3 times   acetaminophen 325 mg oral tablet: 3 tab(s) orally every 8 hours  ALPRAZolam 0.5 mg oral tablet: 1 tab(s) orally once (at bedtime), As Needed  amLODIPine 5 mg oral tablet: 1 tab(s) orally once a day  aspirin 325 mg oral delayed release tablet: 1 tab(s) orally 2 times a day x 2 weeks post op  atorvastatin 80 mg oral tablet: 1 tab(s) orally once a day  Brilinta (ticagrelor) 90 mg oral tablet: 1 tab(s) orally 2 times a day, begin after completion of aspirin and plavix  celecoxib 200 mg oral capsule: 1 cap(s) orally 2 times a day  clopidogrel 75 mg oral tablet: 1 tab(s) orally once a day x 2 weeks post op  Entresto 49 mg-51 mg oral tablet: 1 tab(s) orally 2 times a day  metoprolol tartrate 25 mg oral tablet: 1 tab(s) orally once a day  oxyCODONE 15 mg oral tablet: 1 tab(s) orally every 4 to 6 hours, As Needed MDD:6  pantoprazole 40 mg oral delayed release tablet: 1 tab(s) orally once a day (before a meal)  Senna S 50 mg-8.6 mg oral tablet: 2 tab(s) orally once a day (at bedtime)   tamsulosin 0.4 mg oral capsule: 1 cap(s) orally once a day (at bedtime)

## 2020-12-16 NOTE — PHYSICAL THERAPY INITIAL EVALUATION ADULT - GAIT PATTERN USED, PT EVAL
(+) decreased Right heel strike, velocity and step length. Pt requires verbal cueing for upright posture and hand/foot sequencing./3-point gait

## 2020-12-16 NOTE — CONSULT NOTE ADULT - PROBLEM SELECTOR RECOMMENDATION 3
/ MI/ 2 stents ( 12/19 ) - patient takes Brilinta 90 mg BID - on hold as per ortho , continue ASA/ Plavix as per ortho / cardio - restart Brilinta once OK with ortho ,   continue BB / statins / MI/ 2 stents ( 12/19 ) - patient takes , ASA 81 mg daily , Brilinta 90 mg BID , Brilinta on hold as per ortho , continue ASA/ Plavix as per ortho / cardio - restart Brilinta once OK with ortho .   continue BB / statins

## 2020-12-16 NOTE — DISCHARGE NOTE PROVIDER - NSDCFUADDINST_GEN_ALL_CORE_FT
The patient will be seen in the office between 2-3 weeks for wound check.   **Your first post-operative visit has been scheduled prior to your admission. PLEASE CONTACT OFFICE TO CONFIRM THE APPOINTMENT DATE. Tape will be removed at that time.  **  The silver based dressing is to be removed 7 days from the date of surgery(12/23).   ** CONTACT THE OFFICE IF THE FOLLOWING DEVELOP:  - the dressing becomes soiled or saturated  - you develop a fever greater that 101F  - the wound becomes red or you develop blistering around the wound  * Patient may shower after post-op day #3(12/19).   * The patient will continue home PT consistent with anterior total hip replacement protocol and will continue to practice anterior total hip precautions for a minimum of 6 week. Transition to outpatient PT will occur at the time of the first office visit.   * The patient is FULL weight bearing.  * The patient will continue ASPIRIN 325mg twice daily and PLAVIX for 2 weeks after surgery for blood clot prevention. Patient will then resume home dose BRILINTA on 12/31/2020.  *** While on aspirin, the patient will take daily omeprazole or other similar medication to protect the stomach from irritation.   * The patient will take OXYCODONE AND TYLENOL for pain control and adjust according to prescription and patient needs. Contact the office if pain increases while taking prescribed pain medications or related concerns develop.   * Celebrex will be taken twice daily for 3 weeks for pain control and prevention of excessive bone growth. Additional prescription may be requested at your office follow-up visit.  * The patient will take Senna S while taking oxycodone to prevent narcotic associated constipation.  Additionally, increase water intake (drink at least 8 glasses of water daily) and try adding fiber to the diet by eating fruits, vegetables and foods that are rich in grains. If constipation is experienced, contact the medical/primary care provider to discuss further treatment options.   * To avoid injury at home:  - continue use of rolling walker until cleared by physical therapist  - have family or friend remove all throw rug or objects in hallways that may present a trip hazard.  - if you experience any dizziness or medical concerns, call your medical doctor or  911.  * The implant may activate metal detection devices.  The patient will be seen in the office between 2-3 weeks for wound check.   **Your first post-operative visit has been scheduled prior to your admission. PLEASE CONTACT OFFICE TO CONFIRM THE APPOINTMENT DATE. Tape will be removed at that time.  **  The silver based dressing is to be removed 7 days from the date of surgery(12/23).   ** CONTACT THE OFFICE IF THE FOLLOWING DEVELOP:  - the dressing becomes soiled or saturated  - you develop a fever greater that 101F  - the wound becomes red or you develop blistering around the wound  * Patient may shower after post-op day #3(12/19).   * The patient will continue home PT consistent with anterior total hip replacement protocol and will continue to practice anterior total hip precautions for a minimum of 6 week. Transition to outpatient PT will occur at the time of the first office visit.   * The patient is FULL weight bearing.  * The patient will continue ASPIRIN 325mg twice daily and PLAVIX for 2 weeks after surgery for blood clot prevention. Patient will then resume home dose BRILINTA on 12/31/2020.  *** While on aspirin, the patient will take daily omeprazole or other similar medication to protect the stomach from irritation.   * The patient will take OXYCODONE AND TYLENOL for pain control and adjust according to prescription and patient needs. Contact the office if pain increases while taking prescribed pain medications or related concerns develop.  * Follow-up with Pain Management for chronic pain control  * Follow-up with Primary Care Physician within 2 weeks for follow-up with urinary retention  * Celebrex will be taken twice daily for 3 weeks for pain control and prevention of excessive bone growth. Additional prescription may be requested at your office follow-up visit.  * The patient will take Senna S while taking oxycodone to prevent narcotic associated constipation.  Additionally, increase water intake (drink at least 8 glasses of water daily) and try adding fiber to the diet by eating fruits, vegetables and foods that are rich in grains. If constipation is experienced, contact the medical/primary care provider to discuss further treatment options.   * To avoid injury at home:  - continue use of rolling walker until cleared by physical therapist  - have family or friend remove all throw rug or objects in hallways that may present a trip hazard.  - if you experience any dizziness or medical concerns, call your medical doctor or  911.  * The implant may activate metal detection devices.

## 2020-12-16 NOTE — PROGRESS NOTE ADULT - SUBJECTIVE AND OBJECTIVE BOX
Pelvis & hip films reviewed. Implants are in appropriate position. No fracture or dislocation noted. Patient is WBAT of the surgical extremity.

## 2020-12-16 NOTE — DISCHARGE NOTE PROVIDER - NSDCACTIVITY_GEN_ALL_CORE
Do not drive or operate machinery/Do not make important decisions/Walking - Indoors allowed/No heavy lifting/straining

## 2020-12-16 NOTE — PROGRESS NOTE ADULT - SUBJECTIVE AND OBJECTIVE BOX
NAYELY WAFER    [id number]    History: Patient is status post right anterior total hip arthroplasty POD # 0. Patient is doing well and is comfortable. The patient's pain is controlled using the prescribed pain medications. The patient is participating in physical therapy. Denies CP, SOB, dizziness, HA, fever/chills, numbness or tingling. No new complaints.    Vital Signs Last 24 Hrs  T(C): 36.4 (16 Dec 2020 17:40), Max: 36.6 (16 Dec 2020 06:49)  T(F): 97.5 (16 Dec 2020 17:40), Max: 97.8 (16 Dec 2020 06:49)  HR: 89 (16 Dec 2020 17:40) (50 - 89)  BP: 153/93 (16 Dec 2020 17:40) (104/45 - 157/87)  BP(mean): --  RR: 18 (16 Dec 2020 17:40) (10 - 18)  SpO2: 95% (16 Dec 2020 17:40) (95% - 100%)        General: Alert, awake, NAD  Physical exam: The right hip dressing is clean, dry and intact. No drainage, discharge, erythema, blistering or ecchymosis noted. Left proximal hip dressing c/d/i. No drainage or discharge.   The calf is supple nontender b/l.   SILT. The lateral cutaneous nerve is intact.  +AT/GC/EHL/FHL.   2+ DP pulse. BCR. No cyanosis.      Plan:   - DVT prophylaxis as prescribed, including use of compression devices and ankle pumps  - Continue physical therapy  - Weight bearing status of surgical extremity: WBAT  - Incentive spirometry encouraged  - Pain control as clinically indicated.  - Anterior hip precautions reviewed with patient  - Discharge planning – anticipated discharge is Home   - f/u AM labs

## 2020-12-17 ENCOUNTER — TRANSCRIPTION ENCOUNTER (OUTPATIENT)
Age: 65
End: 2020-12-17

## 2020-12-17 VITALS
RESPIRATION RATE: 18 BRPM | SYSTOLIC BLOOD PRESSURE: 120 MMHG | DIASTOLIC BLOOD PRESSURE: 79 MMHG | TEMPERATURE: 98 F | OXYGEN SATURATION: 94 % | HEART RATE: 68 BPM

## 2020-12-17 DIAGNOSIS — T84.84XA PAIN DUE TO INTERNAL ORTHOPEDIC PROSTHETIC DEVICES, IMPLANTS AND GRAFTS, INITIAL ENCOUNTER: ICD-10-CM

## 2020-12-17 DIAGNOSIS — Z79.891 LONG TERM (CURRENT) USE OF OPIATE ANALGESIC: ICD-10-CM

## 2020-12-17 LAB
ALBUMIN SERPL ELPH-MCNC: 3.9 G/DL — SIGNIFICANT CHANGE UP (ref 3.3–5.2)
ALP SERPL-CCNC: 48 U/L — SIGNIFICANT CHANGE UP (ref 40–120)
ALT FLD-CCNC: 22 U/L — SIGNIFICANT CHANGE UP
ANION GAP SERPL CALC-SCNC: 14 MMOL/L — SIGNIFICANT CHANGE UP (ref 5–17)
APPEARANCE UR: CLEAR — SIGNIFICANT CHANGE UP
AST SERPL-CCNC: 18 U/L — SIGNIFICANT CHANGE UP
BACTERIA # UR AUTO: ABNORMAL
BILIRUB DIRECT SERPL-MCNC: 0.1 MG/DL — SIGNIFICANT CHANGE UP (ref 0–0.3)
BILIRUB INDIRECT FLD-MCNC: 0.3 MG/DL — SIGNIFICANT CHANGE UP (ref 0.2–1)
BILIRUB SERPL-MCNC: 0.5 MG/DL — SIGNIFICANT CHANGE UP (ref 0.4–2)
BILIRUB UR-MCNC: NEGATIVE — SIGNIFICANT CHANGE UP
BUN SERPL-MCNC: 13 MG/DL — SIGNIFICANT CHANGE UP (ref 8–20)
CALCIUM SERPL-MCNC: 8.9 MG/DL — SIGNIFICANT CHANGE UP (ref 8.6–10.2)
CHLORIDE SERPL-SCNC: 107 MMOL/L — SIGNIFICANT CHANGE UP (ref 98–107)
CO2 SERPL-SCNC: 26 MMOL/L — SIGNIFICANT CHANGE UP (ref 22–29)
COLOR SPEC: YELLOW — SIGNIFICANT CHANGE UP
CREAT SERPL-MCNC: 0.89 MG/DL — SIGNIFICANT CHANGE UP (ref 0.5–1.3)
DIFF PNL FLD: NEGATIVE — SIGNIFICANT CHANGE UP
EPI CELLS # UR: SIGNIFICANT CHANGE UP
GLUCOSE SERPL-MCNC: 128 MG/DL — HIGH (ref 70–99)
GLUCOSE UR QL: NEGATIVE MG/DL — SIGNIFICANT CHANGE UP
HCT VFR BLD CALC: 39.3 % — SIGNIFICANT CHANGE UP (ref 39–50)
HGB BLD-MCNC: 12.6 G/DL — LOW (ref 13–17)
KETONES UR-MCNC: NEGATIVE — SIGNIFICANT CHANGE UP
LEUKOCYTE ESTERASE UR-ACNC: ABNORMAL
MAGNESIUM SERPL-MCNC: 2.1 MG/DL — SIGNIFICANT CHANGE UP (ref 1.6–2.6)
MCHC RBC-ENTMCNC: 32.1 GM/DL — SIGNIFICANT CHANGE UP (ref 32–36)
MCHC RBC-ENTMCNC: 32.6 PG — SIGNIFICANT CHANGE UP (ref 27–34)
MCV RBC AUTO: 101.8 FL — HIGH (ref 80–100)
NITRITE UR-MCNC: NEGATIVE — SIGNIFICANT CHANGE UP
PH UR: 6.5 — SIGNIFICANT CHANGE UP (ref 5–8)
PHOSPHATE SERPL-MCNC: 3.3 MG/DL — SIGNIFICANT CHANGE UP (ref 2.4–4.7)
PLATELET # BLD AUTO: 132 K/UL — LOW (ref 150–400)
POTASSIUM SERPL-MCNC: 4.6 MMOL/L — SIGNIFICANT CHANGE UP (ref 3.5–5.3)
POTASSIUM SERPL-SCNC: 4.6 MMOL/L — SIGNIFICANT CHANGE UP (ref 3.5–5.3)
PROT SERPL-MCNC: 6.2 G/DL — LOW (ref 6.6–8.7)
PROT UR-MCNC: NEGATIVE MG/DL — SIGNIFICANT CHANGE UP
RBC # BLD: 3.86 M/UL — LOW (ref 4.2–5.8)
RBC # FLD: 13.2 % — SIGNIFICANT CHANGE UP (ref 10.3–14.5)
RBC CASTS # UR COMP ASSIST: SIGNIFICANT CHANGE UP /HPF (ref 0–4)
SODIUM SERPL-SCNC: 147 MMOL/L — HIGH (ref 135–145)
SP GR SPEC: 1.01 — SIGNIFICANT CHANGE UP (ref 1.01–1.02)
UROBILINOGEN FLD QL: NEGATIVE MG/DL — SIGNIFICANT CHANGE UP
WBC # BLD: 7.8 K/UL — SIGNIFICANT CHANGE UP (ref 3.8–10.5)
WBC # FLD AUTO: 7.8 K/UL — SIGNIFICANT CHANGE UP (ref 3.8–10.5)
WBC UR QL: SIGNIFICANT CHANGE UP

## 2020-12-17 PROCEDURE — 97116 GAIT TRAINING THERAPY: CPT

## 2020-12-17 PROCEDURE — 81001 URINALYSIS AUTO W/SCOPE: CPT

## 2020-12-17 PROCEDURE — 99232 SBSQ HOSP IP/OBS MODERATE 35: CPT

## 2020-12-17 PROCEDURE — 84100 ASSAY OF PHOSPHORUS: CPT

## 2020-12-17 PROCEDURE — S2900: CPT

## 2020-12-17 PROCEDURE — 97110 THERAPEUTIC EXERCISES: CPT

## 2020-12-17 PROCEDURE — 72170 X-RAY EXAM OF PELVIS: CPT

## 2020-12-17 PROCEDURE — 82962 GLUCOSE BLOOD TEST: CPT

## 2020-12-17 PROCEDURE — 83735 ASSAY OF MAGNESIUM: CPT

## 2020-12-17 PROCEDURE — 36415 COLL VENOUS BLD VENIPUNCTURE: CPT

## 2020-12-17 PROCEDURE — 97167 OT EVAL HIGH COMPLEX 60 MIN: CPT

## 2020-12-17 PROCEDURE — 80048 BASIC METABOLIC PNL TOTAL CA: CPT

## 2020-12-17 PROCEDURE — C1889: CPT

## 2020-12-17 PROCEDURE — C1776: CPT

## 2020-12-17 PROCEDURE — 80076 HEPATIC FUNCTION PANEL: CPT

## 2020-12-17 PROCEDURE — C1713: CPT

## 2020-12-17 PROCEDURE — 76000 FLUOROSCOPY <1 HR PHYS/QHP: CPT

## 2020-12-17 PROCEDURE — 97163 PT EVAL HIGH COMPLEX 45 MIN: CPT

## 2020-12-17 PROCEDURE — 72170 X-RAY EXAM OF PELVIS: CPT | Mod: 26

## 2020-12-17 PROCEDURE — 85027 COMPLETE CBC AUTOMATED: CPT

## 2020-12-17 PROCEDURE — 73501 X-RAY EXAM HIP UNI 1 VIEW: CPT

## 2020-12-17 RX ORDER — CELECOXIB 200 MG/1
1 CAPSULE ORAL
Qty: 42 | Refills: 0
Start: 2020-12-17 | End: 2021-01-06

## 2020-12-17 RX ORDER — TAMSULOSIN HYDROCHLORIDE 0.4 MG/1
0.4 CAPSULE ORAL ONCE
Refills: 0 | Status: COMPLETED | OUTPATIENT
Start: 2020-12-17 | End: 2020-12-17

## 2020-12-17 RX ORDER — SENNOSIDES/DOCUSATE SODIUM 8.6MG-50MG
2 TABLET ORAL
Qty: 20 | Refills: 0
Start: 2020-12-17 | End: 2020-12-26

## 2020-12-17 RX ORDER — OXYCODONE HYDROCHLORIDE 5 MG/1
15 TABLET ORAL
Refills: 0 | Status: DISCONTINUED | OUTPATIENT
Start: 2020-12-17 | End: 2020-12-17

## 2020-12-17 RX ORDER — OXYCODONE HYDROCHLORIDE 5 MG/1
10 TABLET ORAL EVERY 12 HOURS
Refills: 0 | Status: DISCONTINUED | OUTPATIENT
Start: 2020-12-17 | End: 2020-12-17

## 2020-12-17 RX ORDER — PANTOPRAZOLE SODIUM 20 MG/1
1 TABLET, DELAYED RELEASE ORAL
Qty: 14 | Refills: 0
Start: 2020-12-17 | End: 2020-12-30

## 2020-12-17 RX ORDER — CLOPIDOGREL BISULFATE 75 MG/1
1 TABLET, FILM COATED ORAL
Qty: 14 | Refills: 0
Start: 2020-12-17 | End: 2020-12-30

## 2020-12-17 RX ORDER — TAMSULOSIN HYDROCHLORIDE 0.4 MG/1
1 CAPSULE ORAL
Qty: 14 | Refills: 0
Start: 2020-12-17 | End: 2020-12-30

## 2020-12-17 RX ORDER — ASPIRIN/CALCIUM CARB/MAGNESIUM 324 MG
1 TABLET ORAL
Qty: 28 | Refills: 0
Start: 2020-12-17 | End: 2020-12-30

## 2020-12-17 RX ORDER — OXYCODONE HYDROCHLORIDE 5 MG/1
10 TABLET ORAL ONCE
Refills: 0 | Status: DISCONTINUED | OUTPATIENT
Start: 2020-12-17 | End: 2020-12-17

## 2020-12-17 RX ORDER — TICAGRELOR 90 MG/1
1 TABLET ORAL
Qty: 0 | Refills: 0 | DISCHARGE

## 2020-12-17 RX ORDER — OXYCODONE HYDROCHLORIDE 5 MG/1
1 TABLET ORAL
Qty: 0 | Refills: 0 | DISCHARGE

## 2020-12-17 RX ORDER — ACETAMINOPHEN 500 MG
3 TABLET ORAL
Qty: 0 | Refills: 0 | DISCHARGE
Start: 2020-12-17

## 2020-12-17 RX ORDER — OXYCODONE HYDROCHLORIDE 5 MG/1
1 TABLET ORAL
Qty: 42 | Refills: 0
Start: 2020-12-17

## 2020-12-17 RX ORDER — TAMSULOSIN HYDROCHLORIDE 0.4 MG/1
0.4 CAPSULE ORAL AT BEDTIME
Refills: 0 | Status: DISCONTINUED | OUTPATIENT
Start: 2020-12-17 | End: 2020-12-17

## 2020-12-17 RX ORDER — OXYCODONE HYDROCHLORIDE 5 MG/1
10 TABLET ORAL
Refills: 0 | Status: DISCONTINUED | OUTPATIENT
Start: 2020-12-17 | End: 2020-12-17

## 2020-12-17 RX ADMIN — Medication 975 MILLIGRAM(S): at 12:16

## 2020-12-17 RX ADMIN — OXYCODONE HYDROCHLORIDE 15 MILLIGRAM(S): 5 TABLET ORAL at 12:38

## 2020-12-17 RX ADMIN — Medication 15 MILLIGRAM(S): at 05:39

## 2020-12-17 RX ADMIN — Medication 325 MILLIGRAM(S): at 05:38

## 2020-12-17 RX ADMIN — CELECOXIB 200 MILLIGRAM(S): 200 CAPSULE ORAL at 05:42

## 2020-12-17 RX ADMIN — HYDROMORPHONE HYDROCHLORIDE 4 MILLIGRAM(S): 2 INJECTION INTRAMUSCULAR; INTRAVENOUS; SUBCUTANEOUS at 02:13

## 2020-12-17 RX ADMIN — HYDROMORPHONE HYDROCHLORIDE 4 MILLIGRAM(S): 2 INJECTION INTRAMUSCULAR; INTRAVENOUS; SUBCUTANEOUS at 08:40

## 2020-12-17 RX ADMIN — HYDROMORPHONE HYDROCHLORIDE 4 MILLIGRAM(S): 2 INJECTION INTRAMUSCULAR; INTRAVENOUS; SUBCUTANEOUS at 03:13

## 2020-12-17 RX ADMIN — HYDROMORPHONE HYDROCHLORIDE 4 MILLIGRAM(S): 2 INJECTION INTRAMUSCULAR; INTRAVENOUS; SUBCUTANEOUS at 05:39

## 2020-12-17 RX ADMIN — Medication 15 MILLIGRAM(S): at 05:42

## 2020-12-17 RX ADMIN — HYDROMORPHONE HYDROCHLORIDE 4 MILLIGRAM(S): 2 INJECTION INTRAMUSCULAR; INTRAVENOUS; SUBCUTANEOUS at 00:01

## 2020-12-17 RX ADMIN — HYDROMORPHONE HYDROCHLORIDE 4 MILLIGRAM(S): 2 INJECTION INTRAMUSCULAR; INTRAVENOUS; SUBCUTANEOUS at 09:40

## 2020-12-17 RX ADMIN — PANTOPRAZOLE SODIUM 40 MILLIGRAM(S): 20 TABLET, DELAYED RELEASE ORAL at 05:38

## 2020-12-17 RX ADMIN — Medication 975 MILLIGRAM(S): at 05:42

## 2020-12-17 RX ADMIN — SODIUM CHLORIDE 3 MILLILITER(S): 9 INJECTION INTRAMUSCULAR; INTRAVENOUS; SUBCUTANEOUS at 01:18

## 2020-12-17 RX ADMIN — TAMSULOSIN HYDROCHLORIDE 0.4 MILLIGRAM(S): 0.4 CAPSULE ORAL at 08:15

## 2020-12-17 RX ADMIN — Medication 15 MILLIGRAM(S): at 12:46

## 2020-12-17 RX ADMIN — Medication 25 MILLIGRAM(S): at 05:38

## 2020-12-17 RX ADMIN — Medication 975 MILLIGRAM(S): at 05:38

## 2020-12-17 RX ADMIN — OXYCODONE HYDROCHLORIDE 10 MILLIGRAM(S): 5 TABLET ORAL at 09:55

## 2020-12-17 RX ADMIN — SODIUM CHLORIDE 150 MILLILITER(S): 9 INJECTION, SOLUTION INTRAVENOUS at 05:42

## 2020-12-17 RX ADMIN — OXYCODONE HYDROCHLORIDE 10 MILLIGRAM(S): 5 TABLET ORAL at 10:50

## 2020-12-17 RX ADMIN — CELECOXIB 200 MILLIGRAM(S): 200 CAPSULE ORAL at 05:38

## 2020-12-17 RX ADMIN — OXYCODONE HYDROCHLORIDE 15 MILLIGRAM(S): 5 TABLET ORAL at 11:45

## 2020-12-17 RX ADMIN — HYDROMORPHONE HYDROCHLORIDE 4 MILLIGRAM(S): 2 INJECTION INTRAMUSCULAR; INTRAVENOUS; SUBCUTANEOUS at 06:40

## 2020-12-17 RX ADMIN — Medication 15 MILLIGRAM(S): at 12:15

## 2020-12-17 RX ADMIN — Medication 100 MILLIGRAM(S): at 02:01

## 2020-12-17 RX ADMIN — CLOPIDOGREL BISULFATE 75 MILLIGRAM(S): 75 TABLET, FILM COATED ORAL at 12:16

## 2020-12-17 RX ADMIN — Medication 975 MILLIGRAM(S): at 13:10

## 2020-12-17 RX ADMIN — SODIUM CHLORIDE 3 MILLILITER(S): 9 INJECTION INTRAMUSCULAR; INTRAVENOUS; SUBCUTANEOUS at 05:42

## 2020-12-17 NOTE — DISCHARGE NOTE NURSING/CASE MANAGEMENT/SOCIAL WORK - PATIENT PORTAL LINK FT
You can access the FollowMyHealth Patient Portal offered by [facility] by registering at the following website: [email or web address]. By joining [email or web address]’s FollowMyHealth portal, you will also be able to view your health information using other applications (apps) compatible with our system.

## 2020-12-17 NOTE — PROGRESS NOTE ADULT - ASSESSMENT
65 year old  male with PMH HTN, high cholesterol, CAD and MI with 2 stents (Dec. 2019) .  Patient c/o bilateral hip pain R>L for several years due to congential hip dysplasia. States pain in right hip become progressively worse over the last 4 months, and has severely affected his ADLs and mobility , refused  cortisone injections in the past   He was previously scheduled to have total hip replacement surgery January 2020 but had a heart attack December 2019 and surgery was postponed. Patient states had x-ray and CT scan which showed bone on bone.  Was taking Oxycodone for pain . Now s/p R NIKKO  , robot assisted , EBL - 100 ml .      Problem/Recommendation - 1:  Problem: Unilateral primary osteoarthritis, right hip. Recommendation: s/p R NIKKO ,   PT/OT/pain mgmt  DVT prophylaxis- as per ortho  Abx as per SCIP  Incentive spirometry  Prophylaxis of opioid  induced constipation.     Problem/Recommendation - 2:  ·  Problem: HTN (hypertension).  Recommendation: - continue Amlodipine / Metoprolol with parameters ,   restart Entresto in am if BP / renal function stable.      Problem/Recommendation - 3:  ·  Problem: CAD (coronary artery disease).  Recommendation: / MI/ 2 stents ( 12/19 ) - patient takes , ASA 81 mg daily , Brilinta 90 mg BID , Brilinta on hold as per ortho (was discussed with patient's Cardiologist as per notes) , continue ASA/ Plavix as per ortho  restart Brilinta once OK with ortho .   continue BB / statins.     Problem/Recommendation - 4:  ·  Problem: High cholesterol.  Recommendation: - continue home dose statins.      Problem/Recommendation - 5:  ·  Problem: Back pain.  Recommendation: - Hx of chronic low back pain with L radiculopathy . PAin management consult. Bowel RX for opioid induced constipation ppx     Problem/Recommendation - 6:  Problem: Need for prophylactic measure. Recommendation: DVT prophylaxis  - as per ortho protocol      Problem / Recommendation - 7: Urinary retention.  Recommendation: Start Flomax. Bladder scan post void to check for retention. check UA

## 2020-12-17 NOTE — CONSULT NOTE ADULT - PROBLEM SELECTOR RECOMMENDATION 2
1. Ok to continue Oxycontin 10mg PO BID for baselined analgesia  2. Maximize use of non-opioid analgesics  - Consider Gabapentin 300mg PO BID for neuropathic pain  - Robaxin 750mg PO TID PRN spasms  - Lidoderm Patch over intact skin  3. The patient may follow up with his outpatient pain management provider post facility stay.
- continue Amlodipine / Metoprolol with parameters ,   restart Entresto in am if BP / renal function stable

## 2020-12-17 NOTE — CONSULT NOTE ADULT - ASSESSMENT
65 year old  male with PMH HTN, high cholesterol, CAD and MI with 2 stents (Dec. 2019) .  Patient c/o bilateral hip pain R>L for several years due to congential hip dysplasia. States pain in right hip become progressively worse over the last 4 months, and has severely affected his ADLs and mobility , refused  cortisone injections in the past   He was previously scheduled to have total hip replacement surgery January 2020 but had a heart attack December 2019 and surgery was postponed. Patient states had x-ray and CT scan which showed bone on bone.  Was taking Oxycodone for pain . Now s/p R NIKKO  , robot assisted , EBL - 100 ml . 
65y old opioid tolerant Male now POD #1 NIKKO. He was found AA&Ox3, NAD, sitting up in chair. Postoperative pain appreciated. Still, he appears fairly comfortable at rest. Discussed resuming Oxycodone as used at home, with an alternatively higher dose of 15mg PRN. A baseline analgesic may also be used to increase duration of relief. The patient verbalizes understanding. Team updated on plan.

## 2020-12-17 NOTE — PROGRESS NOTE ADULT - ATTENDING COMMENTS
pt seen and examined at bedside  66yo male s/p Rt hip replacement, with c/o rt hip pain and pain while urinating. He required morales catheter for urinary retention in PACU, this was discontinued this am, since then has had pain, denies any burning/hematuria   RS: CTAB, CVS - S1,S2 normal. vitals - reviewed.   Agree with above   Bladder scan to check PVR   if patient able to void without any issues and pain well controlled, may be able to go home later today   will follow

## 2020-12-17 NOTE — PROGRESS NOTE ADULT - SUBJECTIVE AND OBJECTIVE BOX
CC: NIKKO      INTERVAL HPI/OVERNIGHT EVENTS: Patient seen and examined. Straight cathed in PACU yesterday after inability to void. Has bee able to void this morning. Clear cynthia urine noted in urinal at bedside. C/O [ain with each urination since. Reports normal BM yesterday. Denies chest pain, SOB, dizziness, nausea,  vomiting , fever, chills. Hip pain not  controlled on current RX, pain management consulted.    Vital Signs Last 24 Hrs  T(C): 36.8 (17 Dec 2020 04:49), Max: 36.8 (17 Dec 2020 04:49)  T(F): 98.2 (17 Dec 2020 04:49), Max: 98.2 (17 Dec 2020 04:49)  HR: 68 (17 Dec 2020 04:49) (50 - 89)  BP: 147/87 (17 Dec 2020 04:49) (104/45 - 157/87)  BP(mean): --  RR: 18 (17 Dec 2020 04:49) (10 - 18)  SpO2: 93% (17 Dec 2020 04:49) (93% - 100%)    PHYSICAL EXAM:    GENERAL: NAD, well-groomed, well-developed  HEAD:  Atraumatic, Normocephalic  EYES: EOMI, PERRLA, conjunctiva and sclera clear  NECK: Supple, No JVD, Normal thyroid  NERVOUS SYSTEM:  Alert & Oriented X3, no focal deficit   CHEST/LUNG: CTA  b/l,  no rales, rhonchi, wheezing, or rubs  HEART: Regular rate and rhythm; No murmurs, rubs, or gallops  ABDOMEN: Soft, Nontender, Nondistended; Bowel sounds present  EXTREMITIES:  2+ Peripheral Pulses, No clubbing, cyanosis, or edema , R hip dressing + , clean and dry   LYMPH: No lymphadenopathy noted  SKIN: No rashes or lesions        I&O's Detail    16 Dec 2020 07:01  -  17 Dec 2020 07:00  --------------------------------------------------------  IN:    Lactated Ringers: 1800 mL  Total IN: 1800 mL    OUT:    Intermittent Catheterization - Urethral (mL): 1175 mL    Voided (mL): 200 mL  Total OUT: 1375 mL    Total NET: 425 mL                                    12.6   7.80  )-----------( 132      ( 17 Dec 2020 06:21 )             39.3     17 Dec 2020 06:21    147    |  107    |  13.0   ----------------------------<  128    4.6     |  26.0   |  0.89     Ca    8.9        17 Dec 2020 06:21  Phos  3.3       17 Dec 2020 06:21  Mg     2.1       17 Dec 2020 06:21    TPro  6.2    /  Alb  3.9    /  TBili  0.5    /  DBili  0.1    /  AST  18     /  ALT  22     /  AlkPhos  48     17 Dec 2020 06:21      CAPILLARY BLOOD GLUCOSE      POCT Blood Glucose.: 110 mg/dL (16 Dec 2020 13:28)    LIVER FUNCTIONS - ( 17 Dec 2020 06:21 )  Alb: 3.9 g/dL / Pro: 6.2 g/dL / ALK PHOS: 48 U/L / ALT: 22 U/L / AST: 18 U/L / GGT: x               MEDICATIONS  (STANDING):  acetaminophen   Tablet .. 975 milliGRAM(s) Oral every 8 hours  aspirin enteric coated 325 milliGRAM(s) Oral two times a day  atorvastatin 80 milliGRAM(s) Oral at bedtime  celecoxib 200 milliGRAM(s) Oral two times a day  clopidogrel Tablet 75 milliGRAM(s) Oral daily  ketorolac   Injectable 15 milliGRAM(s) IV Push every 6 hours  lactated ringers. 1000 milliLiter(s) (150 mL/Hr) IV Continuous <Continuous>  metoprolol tartrate 25 milliGRAM(s) Oral daily  oxyCODONE  ER Tablet 10 milliGRAM(s) Oral every 12 hours  pantoprazole    Tablet 40 milliGRAM(s) Oral before breakfast  polyethylene glycol 3350 17 Gram(s) Oral at bedtime  senna 2 Tablet(s) Oral at bedtime  sodium chloride 0.9% lock flush 3 milliLiter(s) IV Push every 8 hours  tamsulosin 0.4 milliGRAM(s) Oral once  tamsulosin 0.4 milliGRAM(s) Oral at bedtime  thiamine Injectable 100 milliGRAM(s) IntraMuscular once    MEDICATIONS  (PRN):  ALPRAZolam 0.5 milliGRAM(s) Oral at bedtime PRN anxiety  HYDROmorphone   Tablet 4 milliGRAM(s) Oral every 3 hours PRN Severe Pain (7 - 10)  HYDROmorphone  Injectable 0.5 milliGRAM(s) IV Push every 4 hours PRN breakthru  LORazepam   Injectable 2 milliGRAM(s) IV Push every 2 hours PRN CIWA-Ar score increase by 2 points and a total score of 7 or less  magnesium hydroxide Suspension 30 milliLiter(s) Oral daily PRN Constipation  ondansetron Injectable 4 milliGRAM(s) IV Push every 6 hours PRN Nausea and/or Vomiting  oxyCODONE    IR 5 milliGRAM(s) Oral every 3 hours PRN Mild Pain (1 - 3)  oxyCODONE    IR 10 milliGRAM(s) Oral every 3 hours PRN Moderate Pain (4 - 6)      RADIOLOGY & ADDITIONAL TESTS:

## 2020-12-17 NOTE — CONSULT NOTE ADULT - CONSULT REASON
s/p R NIKKO , POD # 0 ,   postop medical mgmt requested
HPI:  65 year old  male with PMH HTN, high cholesterol, CAD and MI with 2 stents (Dec. 2019) present today for PST. Patient c/o bilateral hip pain R>L for several years due to congential hip dysplasia. States pain in right hip become progressively worse over the last 4 months, and has severely affected his ADLs and mobility.  Describes pain as constant, sharp and achy 8/10 without pain medication that radiates on occasion down leg to knee. States pain medication helps bring pain level down 3/10.  Patient states he has to "I crawl out of bed every morning and unable to walk or move until I take my pain medication". Aggravating factors- driving, sitting too long, prolonged standing, donning and doffing socks and shoes. Alleviating factors- pain medication and ice to joint.  States as a child diagnosed with congential hip dysplasia, and at age 5 had a body cast from mid chest to ankles for a few months. States growing up informed he would eventually need hip replacement as an older adult. 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 States  "I live my life on pain medication and I hate it". He is followed by pain management and Dr. Vogt recommended cortisone injections but patient refused.  He was previously scheduled to have total hip replacement surgery January 2020 but had a heart attack December 2019 and surgery was postponed. Patient states had x-ray and CT scan which showed bone on bone.  He is now scheduled for right total hip replacement, direct anterior approach with NIRAJ robot on 12/16 with Dr. Marina pending medical and cardiology clearance. (25 Nov 2020 11:02)    Pain service:  The patient is now POD #1. The team requests assistance with pain management, as he is opioid tolerant. He is being offered Oxycodone and oral Dilaudid PRN, with insufficient analgesia. Per MAR, he used 1 dose of Oxycodone 10mg and 5 doses of Dilaudid 4mg tablets since yesterday.

## 2020-12-17 NOTE — OCCUPATIONAL THERAPY INITIAL EVALUATION ADULT - ADDITIONAL COMMENTS
Pt lives in house with no TAMICA and 12 steps inside up to bedroom and bathroom. Bathroom has bathtub with curtains. Pt does not own any DME. Pt is left handed. Pt drives. Pt's wife is able to assist upon discharge. Pt's daughter lives local and is able to assist upon discharge.

## 2020-12-17 NOTE — CONSULT NOTE ADULT - SUBJECTIVE AND OBJECTIVE BOX
VERBAL REPORT: "I don't think what they're giving me is doing anything."  The patient reports ongoing post surgical hip pain. He denies any difference in his symptoms with Dilaudid tablets, last administered ~1 hour ago. He reports use of Oxycodone 10mg PO TID at home. He believes that the Oxycodone administered yesterday provided better analgesia.  PAIN SCORE:  5-6/10                 SCALE USED: VNRS    Allergies  No Known Allergies      PAST MEDICAL & SURGICAL HISTORY:  Hip arthritis  H/O heart artery stent  CAD (coronary artery disease)  Back pain  Left lumbar radiculopathy  Myocardial infarct  Dec 2019  High cholesterol  HTN (hypertension)  Presence of stent in coronary artery  2 stents Dec 2019  H/O hernia repair  inquinal      MEDICATIONS  (STANDING):  acetaminophen   Tablet .. 975 milliGRAM(s) Oral every 8 hours  aspirin enteric coated 325 milliGRAM(s) Oral two times a day  atorvastatin 80 milliGRAM(s) Oral at bedtime  celecoxib 200 milliGRAM(s) Oral two times a day  clopidogrel Tablet 75 milliGRAM(s) Oral daily  ketorolac   Injectable 15 milliGRAM(s) IV Push every 6 hours  lactated ringers. 1000 milliLiter(s) (150 mL/Hr) IV Continuous <Continuous>  metoprolol tartrate 25 milliGRAM(s) Oral daily  oxyCODONE  ER Tablet 10 milliGRAM(s) Oral once  oxyCODONE  ER Tablet 10 milliGRAM(s) Oral every 12 hours  pantoprazole    Tablet 40 milliGRAM(s) Oral before breakfast  polyethylene glycol 3350 17 Gram(s) Oral at bedtime  senna 2 Tablet(s) Oral at bedtime  sodium chloride 0.9% lock flush 3 milliLiter(s) IV Push every 8 hours  tamsulosin 0.4 milliGRAM(s) Oral at bedtime  thiamine Injectable 100 milliGRAM(s) IntraMuscular once    MEDICATIONS  (PRN):  ALPRAZolam 0.5 milliGRAM(s) Oral at bedtime PRN anxiety  HYDROmorphone   Tablet 4 milliGRAM(s) Oral every 3 hours PRN Severe Pain (7 - 10)  HYDROmorphone  Injectable 0.5 milliGRAM(s) IV Push every 4 hours PRN breakthru  LORazepam   Injectable 2 milliGRAM(s) IV Push every 2 hours PRN CIWA-Ar score increase by 2 points and a total score of 7 or less  magnesium hydroxide Suspension 30 milliLiter(s) Oral daily PRN Constipation  ondansetron Injectable 4 milliGRAM(s) IV Push every 6 hours PRN Nausea and/or Vomiting  oxyCODONE    IR 10 milliGRAM(s) Oral every 3 hours PRN Mild Pain (1 - 3)  oxyCODONE    IR 15 milliGRAM(s) Oral every 3 hours PRN Moderate Pain (4 - 6)      PHYSICAL EXAM:  GENERAL: NAD,well-developed  HEAD:  Atraumatic, Normocephalic  EYES: EOMI, PERRLA, conjunctiva and sclera clear  NERVOUS SYSTEM:  Alert & Oriented X3, Good concentration; Motor Strength 5/5 B/L upper and L lower extremities  CHEST/LUNG: Clear speech, no SOB at rest  ABDOMEN: Nontender, Nondistended  EXTREMITIES:  No clubbing, cyanosis, or edema      Vital Signs Last 24 Hrs  T(C): 36.8 (17 Dec 2020 09:26), Max: 36.8 (17 Dec 2020 04:49)  T(F): 98.3 (17 Dec 2020 09:26), Max: 98.3 (17 Dec 2020 09:26)  HR: 68 (17 Dec 2020 09:26) (50 - 89)  BP: 120/79 (17 Dec 2020 09:26) (104/45 - 157/87)  BP(mean): --  RR: 18 (17 Dec 2020 09:26) (10 - 18)  SpO2: 94% (17 Dec 2020 09:26) (93% - 100%)                          12.6   7.80  )-----------( 132      ( 17 Dec 2020 06:21 )             39.3       LIVER FUNCTIONS - ( 17 Dec 2020 06:21 )  Alb: 3.9 g/dL / Pro: 6.2 g/dL / ALK PHOS: 48 U/L / ALT: 22 U/L / AST: 18 U/L / GGT: x             Pain Service   Text Page: [phone]

## 2020-12-17 NOTE — PROGRESS NOTE ADULT - SUBJECTIVE AND OBJECTIVE BOX
Patient seen and examined at bedside. comfortable in bed, c/o mild-mod pain at operative site. Patient states he sees a pain management specialist outpatient, who prescribes him oxycodone 5mg PO, 6x a day. Patient denies fever/chills, SOB/chest pain, abdominal pain, numbness/tingling. Patient also c/o pain with urination after straight cath last night.    Vital Signs Last 24 Hrs  T(C): 36.8 (17 Dec 2020 04:49), Max: 36.8 (17 Dec 2020 04:49)  T(F): 98.2 (17 Dec 2020 04:49), Max: 98.2 (17 Dec 2020 04:49)  HR: 68 (17 Dec 2020 04:49) (50 - 89)  BP: 147/87 (17 Dec 2020 04:49) (104/45 - 157/87)  BP(mean): --  RR: 18 (17 Dec 2020 04:49) (10 - 18)  SpO2: 93% (17 Dec 2020 04:49) (93% - 100%)    RLE: Mepilex dressing C/D/I. SILT. + dorsi/plantarflexion. DP 2+. calf soft NT B/L  left sided dressing C/D/I                          12.6   7.80  )-----------( 132      ( 17 Dec 2020 06:21 )             39.3     A/P: 65 y.o M s/p right anterior NIKKO POD #1  - WBAT, anterior hip precautions  - D/W Dr. Marina, pain meds adjusted. Pain management called  - retention/pain with urination management as per medicine  - d/c planning - home today if cleared by PT and medicine, pending PM recs Patient seen and examined at bedside. comfortable in bed, c/o mild-mod pain at operative site. Patient states he sees a pain management specialist outpatient, who prescribes him oxycodone 5mg PO, 6x a day. Patient denies fever/chills, SOB/chest pain, abdominal pain, numbness/tingling. Patient also c/o pain with urination after straight cath last night.    Vital Signs Last 24 Hrs  T(C): 36.8 (17 Dec 2020 04:49), Max: 36.8 (17 Dec 2020 04:49)  T(F): 98.2 (17 Dec 2020 04:49), Max: 98.2 (17 Dec 2020 04:49)  HR: 68 (17 Dec 2020 04:49) (50 - 89)  BP: 147/87 (17 Dec 2020 04:49) (104/45 - 157/87)  BP(mean): --  RR: 18 (17 Dec 2020 04:49) (10 - 18)  SpO2: 93% (17 Dec 2020 04:49) (93% - 100%)    RLE: Mepilex dressing C/D/I. SILT. + dorsi/plantarflexion. DP 2+. calf soft NT B/L  left sided dressing C/D/I                          12.6   7.80  )-----------( 132      ( 17 Dec 2020 06:21 )             39.3     A/P: 65 y.o M s/p right anterior NIKKO POD #1  - WBAT, anterior hip precautions  - D/W Dr. Marina, pain meds adjusted. Pain management called  - retention/pain with urination management as per medicine  - d/c planning - home today if cleared by PT and medicine, pending PM recs    ADDENDUM 12/17 12:40 PM  S/W Jay (pain management) - recommends d/c patient on oxycodone 15mg PO

## 2020-12-17 NOTE — CONSULT NOTE ADULT - PROBLEM SELECTOR RECOMMENDATION 9
1. Continue Tylenol 975mg PO q8hrs for mild pain  2. Continue Celebrex 200mg PO BID for inflammation  3. Discontinue Dilaudid tablets  - Offer Oxycodone IR 10/15mg PO q3hrs PRN moderate/severe pain, respectively
s/p R NIKKO ,   PT/OT/pain mgmt  DVT prophylaxis- as per ortho  Abx as per SCIP  Incentive spirometry  Prophylaxis of opioid  induced constipation.

## 2020-12-18 ENCOUNTER — NON-APPOINTMENT (OUTPATIENT)
Age: 65
End: 2020-12-18

## 2020-12-21 LAB — GLUCOSE BLDC GLUCOMTR-MCNC: 128 MG/DL — HIGH (ref 70–99)

## 2020-12-30 ENCOUNTER — APPOINTMENT (OUTPATIENT)
Dept: PHYSICAL MEDICINE AND REHAB | Facility: CLINIC | Age: 65
End: 2020-12-30
Payer: MEDICAID

## 2020-12-30 VITALS
HEIGHT: 70 IN | HEART RATE: 66 BPM | BODY MASS INDEX: 27.92 KG/M2 | DIASTOLIC BLOOD PRESSURE: 88 MMHG | SYSTOLIC BLOOD PRESSURE: 144 MMHG | TEMPERATURE: 97.2 F | WEIGHT: 195 LBS

## 2020-12-30 PROCEDURE — 99213 OFFICE O/P EST LOW 20 MIN: CPT

## 2020-12-30 PROCEDURE — 99072 ADDL SUPL MATRL&STAF TM PHE: CPT

## 2020-12-30 NOTE — PHYSICAL EXAM
[FreeTextEntry1] : NAD. \par A&Ox3\par Overweight but not obese\par L-spine ROM - near full forward flexion w/o pain; 10-15' passive extension w/o pain\par RIGHT Hip -  surgical dressing applied; C/D/I\par Pelvic tilt ++ LEFT (not right)\par Seated slump - neg\par SEAN's - deferred\par MMT: deferred\par DTR's 2+ knees/ankles. \par SILT. \par

## 2020-12-30 NOTE — ASSESSMENT
[FreeTextEntry1] : 65 y.o. M w/ h/o chronic right hip pain 2' advanced b/l femoroacetabular OA s/p R NIKKO (12/16/20) doing well post-op.  We discussed slow wean off his narcotic analgesics by decreasing one tab oxycodone 5 mg/week.  I again reviewed R/B/A of narcotic analgesics including but not limited to respiratory depression, CNS depression, N/V/C, physiological and psychological addiction and untimely death if not used as directed. Pt. was again instructed not to take narcotic analgesics w/ benzodiazepines (prescribed from his PMD). Pt. also understands that NSAIDs remain absolutely contraindicated given his recent cardiac history.  May consider US guided LEFT HIP CSI in few months.  Will eRx next week and pt. will RTC 2 weeks for f/u. \par

## 2020-12-30 NOTE — HISTORY OF PRESENT ILLNESS
[FreeTextEntry1] : 65 y.o. M w/ h/o chronic right hip pain 2' advanced b/l femoroacetabular OA returns to office for f/u s/p R INKKO (12/16/20) w/ Dr. Marina.  Pt. reports no complications during surgery.  Discharged following day w/ RW -> SC.  \par Pain worse in early AM and gets better throughout day.  Pt. will see Dr. Marina on 1/5/21.  Doing home P.T. now.  I eRx'd patient's oxycodone 5 mg 2 days ago.

## 2021-01-05 ENCOUNTER — APPOINTMENT (OUTPATIENT)
Dept: ORTHOPEDIC SURGERY | Facility: CLINIC | Age: 66
End: 2021-01-05
Payer: MEDICAID

## 2021-01-05 VITALS — WEIGHT: 195 LBS | BODY MASS INDEX: 27.92 KG/M2 | HEIGHT: 70 IN

## 2021-01-05 DIAGNOSIS — Z47.1 AFTERCARE FOLLOWING JOINT REPLACEMENT SURGERY: ICD-10-CM

## 2021-01-05 PROCEDURE — 73502 X-RAY EXAM HIP UNI 2-3 VIEWS: CPT | Mod: RT

## 2021-01-05 PROCEDURE — 99024 POSTOP FOLLOW-UP VISIT: CPT

## 2021-01-05 RX ORDER — METOPROLOL SUCCINATE 25 MG/1
25 TABLET, EXTENDED RELEASE ORAL
Qty: 90 | Refills: 0 | Status: ACTIVE | COMMUNITY
Start: 2020-10-07

## 2021-01-05 RX ORDER — AMLODIPINE BESYLATE 5 MG/1
5 TABLET ORAL
Qty: 90 | Refills: 0 | Status: ACTIVE | COMMUNITY
Start: 2020-10-07

## 2021-01-05 NOTE — ADDENDUM
[FreeTextEntry1] : This note was authored by Paul Garibay working as a medical scribe for Dr. Jaswinder Marina. The note was reviewed, edited, and revised by Dr. Jaswinder Marina whom is in agreement with the exam findings, imaging findings, and treatment plan. 01/05/2021.

## 2021-01-05 NOTE — HISTORY OF PRESENT ILLNESS
[Doing Well] : is doing well [Excellent Pain Control] : has excellent pain control [No Sign of Infection] : is showing no signs of infection [Erythema] : not erythematous [Discharge] : absent of discharge [Dehiscence] : not dehisced [de-identified] : S/P Right robotic-assisted anterior approach THR with Rober, DOS: 12/16/20. [de-identified] : The patient is a 65 year old male 3 weeks s/p right anterior THR. He is ambulating and transferring well without assistive devices. He reports occasional groin discomfort, but no significant pain. He has concluded home therapy at this time. For DVT prophylaxis he is on aspirin twice per day. Pain is controlled well with oxycodone and Tylenol. He denies systemic symptoms of fever or chills. He denies drainage from the incision site.  [de-identified] : Exam of the left hip shows contralateral iliac crest incisions are healing well. \par Exam of the right hip shows a well healing incision. SLR without difficulty, hip flexion of 100 degrees, hip external rotation of 35 degrees, internal rotation of 15 degrees, no lower leg swelling. 5/5 motor strength bilaterally distally. Sensation intact distally. LFCN intact.  [de-identified] : Xray- AP pelvis and 2 views of the right hip shows a hip replacement in stable position, without sign of fracture or dislocation.  [de-identified] : The patient is doing very well 3 weeks after right anterior total hip replacement. The patient will be transitioned to outpatient physical therapy and a prescription was given for that. The patient will continue aspirin 325 mg twice per day for DVT prophylaxis for the next 3 weeks. Anterior hip precautions were reinforced. Overall the patient is very happy with their outcome so far. Followup in 3 weeks with repeat x-rays.

## 2021-01-07 ENCOUNTER — APPOINTMENT (OUTPATIENT)
Dept: PHYSICAL MEDICINE AND REHAB | Facility: CLINIC | Age: 66
End: 2021-01-07
Payer: MEDICAID

## 2021-01-07 VITALS
HEIGHT: 70 IN | DIASTOLIC BLOOD PRESSURE: 93 MMHG | BODY MASS INDEX: 27.92 KG/M2 | HEART RATE: 70 BPM | WEIGHT: 195 LBS | SYSTOLIC BLOOD PRESSURE: 152 MMHG

## 2021-01-07 PROCEDURE — 99072 ADDL SUPL MATRL&STAF TM PHE: CPT

## 2021-01-07 PROCEDURE — 99213 OFFICE O/P EST LOW 20 MIN: CPT

## 2021-01-07 NOTE — PHYSICAL EXAM
[FreeTextEntry1] : NAD. \par A&Ox3\par Overweight but not obese\par L-spine ROM - near full forward flexion w/o pain; 10-15' passive extension w/o pain\par RIGHT Hip -  improved ROM IR/ER w/o pain; LEFT HIP - restricted IR/ER w/ pain \par Pelvic tilt ++ LEFT (not right)\par Seated slump - neg\par SEAN's - deferred\par MMT: 5/5 b/l HF/KE/DF/PF\par DTR's 2+ knees/ankles\par SILT\par

## 2021-01-07 NOTE — ASSESSMENT
[FreeTextEntry1] : 65 y.o. M w/ h/o chronic right hip pain 2' advanced b/l femoroacetabular OA s/p R NIKKO (12/16/20) doing well post-op.  We discussed slow wean off his narcotic analgesics by decreasing one tab oxycodone 5 mg/week which he will begin soon.  Advised to c/w P.T. for gentle ROM and strengthening exercises.  Will wean from SC once pain is better.   I again reviewed R/B/A of narcotic analgesics including but not limited to respiratory depression, CNS depression, N/V/C, physiological and psychological addiction and untimely death if not used as directed. Pt. was again instructed not to take narcotic analgesics w/ benzodiazepines (prescribed from his PMD).  Pt. also understands that NSAIDs remain absolutely contraindicated given his recent cardiac history.  May consider US guided LEFT HIP CSI in few months.  Will eRx next week and pt. will RTC 2 weeks for f/u. \par

## 2021-01-21 ENCOUNTER — APPOINTMENT (OUTPATIENT)
Dept: PHYSICAL MEDICINE AND REHAB | Facility: CLINIC | Age: 66
End: 2021-01-21
Payer: MEDICAID

## 2021-01-21 DIAGNOSIS — Z96.649 PRESENCE OF UNSPECIFIED ARTIFICIAL HIP JOINT: ICD-10-CM

## 2021-01-21 DIAGNOSIS — M16.11 UNILATERAL PRIMARY OSTEOARTHRITIS, RIGHT HIP: ICD-10-CM

## 2021-01-21 PROCEDURE — 99212 OFFICE O/P EST SF 10 MIN: CPT

## 2021-01-21 PROCEDURE — 99072 ADDL SUPL MATRL&STAF TM PHE: CPT

## 2021-01-21 NOTE — HISTORY OF PRESENT ILLNESS
[FreeTextEntry1] : 65 y.o. M w/ h/o chronic right hip pain 2' advanced b/l femoroacetabular OA s/p R NIKKO (12/16/20) returns to office for medication f/u.  Rx oxycodone 5 mg eRx'd #6 on 1/19/21 to get patient back on regular prescribing schedule to coincide w/ office visits q 2 weeks alternating w/ eRx when not in office.  Pt. is still taking 2 tabs tid which he tolerates well.  He is going to P.T. TIW.  Pain is worse in AM going down stairs.  Pain still wakes patient out of sleep.  He is complaint w/ HEP.  He is c/o more left sided hip pain.  He has spoken with Dr. Marina about doing left NIKKO.

## 2021-01-21 NOTE — ASSESSMENT
[FreeTextEntry1] : 65 y.o. M w/ h/o chronic right hip pain 2' advanced femoroacetabular OA s/p R NIKKO (12/16/20).  Very weak right hip abductors.  Advised patient to c/w P.T. for strengthening and stabilization exercises.  We discussed slow wean off his narcotic analgesics but patient is still struggling in P.T.   I again reviewed R/B/A of narcotic analgesics including but not limited to respiratory depression, CNS depression, N/V/C, physiological and psychological addiction and untimely death if not used as directed. Pt. was again instructed not to take narcotic analgesics w/ benzodiazepines (prescribed from his PMD).  Pt. also understands that NSAIDs remain absolutely contraindicated given his recent cardiac history.  Discussed R/B/A to US guided LEFT HIP CSI to decrease his reliance on narcotic analgesics which he will consider.  Will eRx oxycodone 5 mg; 1-2 tabs po tid (#42) now and pt. will RTC 2 weeks for f/u. I-STOP database checked.  \par

## 2021-01-21 NOTE — PHYSICAL EXAM
[FreeTextEntry1] : NAD. \par A&Ox3\par Overweight but not obese\par No significant change in today's [id number]\par L-spine ROM - near full forward flexion w/o pain; 10-15' passive extension w/o pain\par RIGHT Hip -  improved ROM IR/ER w/o pain\par LEFT HIP - restricted IR/ER w/ pain \par Pelvic tilt ++ LEFT (not right)\par Seated slump - neg\par SEAN's - deferred\par MMT: 5/5 b/l HF/KE/DF/PF; 3+/5 RIGHT HIP ABDUCTORS\par DTR's 2+ knees/ankles\par SILT\par

## 2021-01-26 ENCOUNTER — APPOINTMENT (OUTPATIENT)
Dept: FAMILY MEDICINE | Facility: CLINIC | Age: 66
End: 2021-01-26
Payer: MEDICAID

## 2021-01-26 ENCOUNTER — APPOINTMENT (OUTPATIENT)
Dept: ORTHOPEDIC SURGERY | Facility: CLINIC | Age: 66
End: 2021-01-26
Payer: MEDICAID

## 2021-01-26 VITALS
BODY MASS INDEX: 27.92 KG/M2 | OXYGEN SATURATION: 98 % | HEIGHT: 70 IN | TEMPERATURE: 97.2 F | DIASTOLIC BLOOD PRESSURE: 90 MMHG | HEART RATE: 68 BPM | SYSTOLIC BLOOD PRESSURE: 160 MMHG | WEIGHT: 195 LBS

## 2021-01-26 VITALS — DIASTOLIC BLOOD PRESSURE: 84 MMHG | SYSTOLIC BLOOD PRESSURE: 136 MMHG

## 2021-01-26 VITALS
DIASTOLIC BLOOD PRESSURE: 100 MMHG | WEIGHT: 195 LBS | HEIGHT: 70 IN | SYSTOLIC BLOOD PRESSURE: 160 MMHG | HEART RATE: 75 BPM | BODY MASS INDEX: 27.92 KG/M2

## 2021-01-26 DIAGNOSIS — M25.559 PAIN IN UNSPECIFIED HIP: ICD-10-CM

## 2021-01-26 PROCEDURE — 99214 OFFICE O/P EST MOD 30 MIN: CPT

## 2021-01-26 PROCEDURE — 73502 X-RAY EXAM HIP UNI 2-3 VIEWS: CPT | Mod: RT

## 2021-01-26 PROCEDURE — 99072 ADDL SUPL MATRL&STAF TM PHE: CPT

## 2021-01-26 PROCEDURE — 99024 POSTOP FOLLOW-UP VISIT: CPT

## 2021-01-26 RX ORDER — SILDENAFIL 50 MG/1
50 TABLET ORAL
Qty: 5 | Refills: 0 | Status: DISCONTINUED | COMMUNITY
Start: 2019-08-30 | End: 2021-01-26

## 2021-01-26 NOTE — HISTORY OF PRESENT ILLNESS
[Doing Well] : is doing well [Excellent Pain Control] : has excellent pain control [No Sign of Infection] : is showing no signs of infection [Erythema] : not erythematous [Discharge] : absent of discharge [Dehiscence] : not dehisced [de-identified] : S/P Right robotic-assisted anterior approach THR with Rober, DOS: 12/16/20. \par  [de-identified] : The patient is a 65 year old male 6 weeks s/p right anterior THR. He reports over the last few weeks having lower buttock and lateral thigh pain. He reports minimal groin pain at this time. He is ambulating and transferring well without assistive devices but does limp at times.  He is not using any assistive devices. He reports attending outpatient physical therapy 3 times weekly with good improvement of strength. He reports utilizing pain medication twice per day. He reports returning to daily activities, but notes increased pain with activity. He denies symptoms of fever, chills, or discharge from the incision site.  [de-identified] : Exam of the right hip shows a healing incision with a small residual scab at the distal third.  No erythema.  Mild persistent swelling of the soft tissues.  SLR with moderate difficulty, hip external rotation of 45 degrees, internal rotation of 25 degrees, hip flexion of 100 degrees.  No pain with passive hip range of motion.  5/5 motor strength bilaterally distally. Sensation intact distally. LFCN intact.  [de-identified] : Xray- AP pelvis and 2 views of the right hip shows a hip replacement in stable position, without sign of fracture or dislocation.  No evidence of loosening or component migration.  No fractures. [de-identified] : The patient is doing well 6 weeks following right anterior total hip replacement.  I think he is having some symptoms of sciatica.  I recommended he use a cane to try to decrease load to the right hip as he is likely overloading it because of his severely arthritic left hip.  Celebrex was renewed.  Anterior hip precautions were reviewed and recommended to be followed for another 6 weeks. The patient will continue outpatient physical therapy and gradually transition to a home exercise program over the next 6 weeks.  I recommend that he decrease the amount of therapy he is doing until he starts feeling better.  The patient may stop taking full strength aspirin at this point. Dental prophylaxis was reviewed.  I would like to see him back in 3 weeks for close follow-up.

## 2021-01-26 NOTE — ADDENDUM
[FreeTextEntry1] : This note was authored by Paul Garibay working as a medical scribe for Dr. Jaswinder Marina. The note was reviewed, edited, and revised by Dr. Jaswinder Marina whom is in agreement with the exam findings, imaging findings, and treatment plan. 01/26/2021.

## 2021-02-05 ENCOUNTER — APPOINTMENT (OUTPATIENT)
Dept: PHYSICAL MEDICINE AND REHAB | Facility: CLINIC | Age: 66
End: 2021-02-05
Payer: MEDICAID

## 2021-02-05 VITALS
BODY MASS INDEX: 27.92 KG/M2 | WEIGHT: 195 LBS | TEMPERATURE: 97.2 F | DIASTOLIC BLOOD PRESSURE: 109 MMHG | OXYGEN SATURATION: 98 % | RESPIRATION RATE: 16 BRPM | HEIGHT: 70 IN | HEART RATE: 75 BPM | SYSTOLIC BLOOD PRESSURE: 186 MMHG

## 2021-02-05 DIAGNOSIS — M25.551 PAIN IN RIGHT HIP: ICD-10-CM

## 2021-02-05 PROCEDURE — 99072 ADDL SUPL MATRL&STAF TM PHE: CPT

## 2021-02-05 PROCEDURE — 99212 OFFICE O/P EST SF 10 MIN: CPT

## 2021-02-05 NOTE — ASSESSMENT
[FreeTextEntry1] : 65 y.o. M w/ h/o chronic right hip pain 2' advanced femoroacetabular OA s/p R NIKKO (12/16/20).  Very weak right hip abductors.  Advised patient to c/w P.T. for strengthening and stabilization exercises.  We again discussed slow wean off his narcotic analgesics but patient is still struggling in P.T.  He does not want to change doctors and consult a chronic pain management MD.  I again reviewed R/B/A of narcotic analgesics including but not limited to respiratory depression, CNS depression, N/V/C, physiological and psychological addiction and untimely death if not used as directed. Pt. was again instructed not to take narcotic analgesics w/ the benzodiazepines prescribed by his PMD.  Pt. also understands that NSAIDs remain absolutely contraindicated given his recent cardiac history.  Again, discussed R/B/A to US guided LEFT HIP CSI to decrease his reliance on narcotic analgesics which he has agreed to.  Will eRx oxycodone 5 mg; 1-2 tabs po tid (#42) now and pt. will RTC 2 weeks for f/u and US guided left hip CSI. I-STOP database checked.  \par

## 2021-02-05 NOTE — HISTORY OF PRESENT ILLNESS
[FreeTextEntry1] : 65 y.o. M w/ h/o chronic right hip pain 2' advanced femoroacetabular OA s/p R NIKKO (12/16/20) returns to office for f/u.  Pt. is still taking 2 tabs oxycodone 5 mg tid which he tolerates well.  Pain level is 6-7/10 worse in AM.  Pain is in both hips (can't determine which hip is more painful).  He describes intermittent LBP radiating into both buttocks and proximal posterior thighs.  He is still participating in P.T. as tolerated (can make his pain worse).  Pt. is walking limited distances w/ SC.

## 2021-02-05 NOTE — PHYSICAL EXAM
[FreeTextEntry1] : NAD. \par A&Ox3\par Overweight but not obese\par No significant change in today's [id number]\par L-spine ROM - near full forward flexion w/o pain; 10-15' passive extension w/o pain\par RIGHT Hip -  improved ROM IR/ER w/o pain\par LEFT HIP - restricted IR/ER w/ pain \par Pelvic tilt ++ LEFT (not right)\par Seated slump - neg\par SEAN's - deferred\par MMT: 5/5 b/l HF/KE/DF/PF; 3+/5 RIGHT HIP ABDUCTORS\par DTR's 2+ knees/ankles\par SILT\par

## 2021-02-12 ENCOUNTER — NON-APPOINTMENT (OUTPATIENT)
Age: 66
End: 2021-02-12

## 2021-02-18 ENCOUNTER — APPOINTMENT (OUTPATIENT)
Dept: PHYSICAL MEDICINE AND REHAB | Facility: CLINIC | Age: 66
End: 2021-02-18
Payer: MEDICAID

## 2021-02-18 VITALS
HEIGHT: 70 IN | BODY MASS INDEX: 27.92 KG/M2 | HEART RATE: 75 BPM | DIASTOLIC BLOOD PRESSURE: 101 MMHG | SYSTOLIC BLOOD PRESSURE: 148 MMHG | WEIGHT: 195 LBS

## 2021-02-18 VITALS — TEMPERATURE: 98.1 F

## 2021-02-18 PROCEDURE — 99212 OFFICE O/P EST SF 10 MIN: CPT

## 2021-02-18 PROCEDURE — 99072 ADDL SUPL MATRL&STAF TM PHE: CPT

## 2021-02-18 NOTE — PHYSICAL EXAM
[FreeTextEntry1] : NAD. \par A&Ox3\par Overweight but not obese\par No significant change in today's [id number]\par L-spine ROM - near full forward flexion w/o pain; 10-15' passive extension w/o pain\par RIGHT Hip -  improved ROM IR/ER w/o pain\par LEFT HIP - restricted IR/ER w/ pain \par Pelvic tilt ++ LEFT (not right)\par Seated slump - neg\par SEAN's - deferred\par MMT: 5/5 b/l HF/KE/DF/PF; 3+/5 RIGHT HIP ABDUCTORS\par DTR's 2+ knees/ankles\par SILT\par

## 2021-02-18 NOTE — HISTORY OF PRESENT ILLNESS
[FreeTextEntry1] : 65 y.o. M w/ h/o chronic right hip pain 2' advanced femoroacetabular OA s/p R NIKKO (12/16/20) returns to office for f/u.  He continues on oxycodone 5 mg; 2 tabs po tid which he tolerates well.  Denies GI or CNS side effects.  Pain level is about 5/10 on average.  Pending left hip US guided CSI next week.  Pt. stopped P.T. but will resume next Monday.  Uses stationary bike at home.  Of note, pt's PMD Rx benzo which patient uses intermittently for sleep but states that he does not use together w/ oxycodone.   He is still AC on ASA + Brilinta.

## 2021-02-24 ENCOUNTER — APPOINTMENT (OUTPATIENT)
Dept: PHYSICAL MEDICINE AND REHAB | Facility: CLINIC | Age: 66
End: 2021-02-24
Payer: MEDICAID

## 2021-02-24 PROCEDURE — 20611 DRAIN/INJ JOINT/BURSA W/US: CPT | Mod: LT

## 2021-02-24 PROCEDURE — 99072 ADDL SUPL MATRL&STAF TM PHE: CPT

## 2021-03-02 ENCOUNTER — APPOINTMENT (OUTPATIENT)
Dept: ORTHOPEDIC SURGERY | Facility: CLINIC | Age: 66
End: 2021-03-02

## 2021-03-04 ENCOUNTER — APPOINTMENT (OUTPATIENT)
Dept: FAMILY MEDICINE | Facility: CLINIC | Age: 66
End: 2021-03-04

## 2021-03-09 ENCOUNTER — APPOINTMENT (OUTPATIENT)
Dept: PHYSICAL MEDICINE AND REHAB | Facility: CLINIC | Age: 66
End: 2021-03-09
Payer: MEDICAID

## 2021-03-09 VITALS
WEIGHT: 195 LBS | DIASTOLIC BLOOD PRESSURE: 118 MMHG | TEMPERATURE: 97.8 F | SYSTOLIC BLOOD PRESSURE: 189 MMHG | HEIGHT: 70 IN | BODY MASS INDEX: 27.92 KG/M2 | OXYGEN SATURATION: 99 % | RESPIRATION RATE: 16 BRPM | HEART RATE: 79 BPM

## 2021-03-09 VITALS — TEMPERATURE: 97.8 F

## 2021-03-09 PROCEDURE — 99072 ADDL SUPL MATRL&STAF TM PHE: CPT

## 2021-03-09 PROCEDURE — 99212 OFFICE O/P EST SF 10 MIN: CPT

## 2021-03-09 NOTE — ASSESSMENT
[FreeTextEntry1] : 65 y.o. M w/ h/o chronic bilateral hip pain 2' advanced femoroacetabular OA s/p R NIKKO (12/16/20) now s/p US guided LEFT hip CSI w/ good relief of sxs.  Pt. still with very weak right hip abductors.  Advised patient to c/w HEP for strengthening and stabilization exercises.  We again discussed slow wean off his narcotic analgesics but patient is hesitant.  He is thinking of undergoing left NIKKO in Nov or Dec 2021.   He does not want to change doctors and consult a chronic pain management MD.  I again reviewed R/B/A of narcotic analgesics including but not limited to respiratory depression, CNS depression, N/V/C, physiological and psychological addiction, paradoxical hyperalgesia and untimely death if not used as directed.  Pt. was again instructed not to take narcotic analgesics w/ the benzodiazepines prescribed by his PMD.  Pt. also understands that NSAIDs remain absolutely contraindicated given his recent cardiac history.   Further advised him to f/u w/ PMD re: his HTN as his BP was elevated at today's visit.   Will eRx oxycodone 5 mg; 1-2 tabs po tid (#42)  and pt. will RTC 2 weeks.   I-STOP database checked.  \par

## 2021-03-09 NOTE — HISTORY OF PRESENT ILLNESS
[FreeTextEntry1] : 65 y.o. M w/ h/o chronic bilateral hip pain 2' advanced femoroacetabular OA s/p R NIKKO (12/16/20) returns to office for f/u s/p LEFT hip US guided CSI (2/24/21).  Pt. reports about 60% improvement in painful sxs post-procedure.  He is able to stand and move for longer periods throughout the day.  His pain regimen is unchanged (2 tabs oxycodone 5 mg po tid) but he would like to start his wean in few weeks time.  He is tolerating the medication well without AEs.  Pt. stopped his P.T. but is compliant w/ his HEP.

## 2021-03-23 ENCOUNTER — APPOINTMENT (OUTPATIENT)
Dept: PHYSICAL MEDICINE AND REHAB | Facility: CLINIC | Age: 66
End: 2021-03-23
Payer: MEDICAID

## 2021-03-23 VITALS — TEMPERATURE: 97.8 F

## 2021-03-23 PROCEDURE — 99072 ADDL SUPL MATRL&STAF TM PHE: CPT

## 2021-03-23 PROCEDURE — 99214 OFFICE O/P EST MOD 30 MIN: CPT

## 2021-03-23 NOTE — ASSESSMENT
[FreeTextEntry1] : 65 y.o. M w/ h/o chronic bilateral hip pain 2' advanced femoroacetabular OA s/p R NIKKO (12/16/20) now s/p US guided LEFT hip CSI.  Pt. still with very weak right hip abductors.  Advised patient to c/w HEP for strengthening and stabilization exercises.  We again discussed slow wean off his narcotic analgesics but patient is still hesitant.  I recommended taking 5 mg off his total daily dose and see how he does.  He would like to wait a few more weeks.  He is thinking of undergoing left NIKKO in Nov or Dec 2021.   I also discussed option of buprenorphine but he would need to go to pain management MD who takes his insurance.  I again reviewed R/B/A of narcotic analgesics including but not limited to respiratory depression, CNS depression, N/V/C, physiological and psychological addiction, paradoxical hyperalgesia and untimely death if not used as directed.  Pt. was again instructed not to take narcotic analgesics w/ the benzodiazepines prescribed by his PMD.  Will check urine tox.  Pt. also understands that NSAIDs remain absolutely contraindicated given his recent cardiac history.   Will eRx oxycodone 5 mg; 1-2 tabs po tid (#42)  and pt. will RTC 2 weeks.   I-STOP database checked.  \par

## 2021-03-23 NOTE — PHYSICAL EXAM
[FreeTextEntry1] : NAD. \par A&Ox3\par Overweight but not obese\par No significant change in today's [id number]\par L-spine ROM - near full forward flexion w/o pain; 10-15' passive extension w/o pain\par RIGHT Hip -  improved ROM IR/ER w/o pain\par LEFT HIP - restricted IR/ER w/ pain (stable)\par Pelvic tilt ++ LEFT (not right)\par Seated slump - neg\par SEAN's - deferred\par MMT: 5/5 b/l HF/KE/DF/PF; 4-/5 RIGHT HIP ABDUCTORS - stable\par DTR's 2+ knees/ankles\par SILT\par

## 2021-04-06 ENCOUNTER — APPOINTMENT (OUTPATIENT)
Dept: PHYSICAL MEDICINE AND REHAB | Facility: CLINIC | Age: 66
End: 2021-04-06
Payer: MEDICAID

## 2021-04-06 VITALS
HEART RATE: 64 BPM | TEMPERATURE: 98 F | BODY MASS INDEX: 27.92 KG/M2 | DIASTOLIC BLOOD PRESSURE: 100 MMHG | WEIGHT: 195 LBS | SYSTOLIC BLOOD PRESSURE: 164 MMHG | RESPIRATION RATE: 14 BRPM | HEIGHT: 70 IN | OXYGEN SATURATION: 99 %

## 2021-04-06 VITALS — TEMPERATURE: 98.4 F

## 2021-04-06 PROCEDURE — 99072 ADDL SUPL MATRL&STAF TM PHE: CPT

## 2021-04-06 PROCEDURE — 99214 OFFICE O/P EST MOD 30 MIN: CPT

## 2021-04-06 NOTE — PHYSICAL EXAM
[FreeTextEntry1] : NAD. \par A&Ox3\par Overweight but not obese\par No significant change in today's [id number]\par L-spine ROM - near full forward flexion w/o pain; 10-15' passive extension w/o pain\par RIGHT Hip -  improved ROM IR/ER w/o pain\par LEFT HIP - restricted IR/ER w/ pain (stable)\par Pelvic tilt ++ LEFT (not right)\par Seated slump - neg\par SEAN's - deferred\par MMT: 5/5 b/l HF/KE/DF/PF; 4-/5 RIGHT HIP ABDUCTORS - stable\par DTR's 2+ knees/ankles\par SILT\par

## 2021-04-06 NOTE — ASSESSMENT
[FreeTextEntry1] : 65 y.o. M w/ h/o chronic bilateral hip pain 2' advanced femoroacetabular OA s/p R NIKKO (12/16/20) now s/p US guided LEFT hip CSI.  Pt. still with very weak right hip abductors.  Advised patient to c/w HEP for strengthening and stabilization exercises.  Pt. would like to begin slow wean off his narcotic analgesics.  I recommended taking 5 mg off his total daily dose and see how he does.  Will eRx oxycodone 5 mg; 1-2 tabs po tid (#35).   I-STOP database checked.  I also discussed option of buprenorphine but he would need to go to pain management MD who takes his insurance.  I again reviewed R/B/A of narcotic analgesics including but not limited to respiratory depression, CNS depression, N/V/C, physiological and psychological addiction, paradoxical hyperalgesia and untimely death if not used as directed.  Pt. was again instructed not to take narcotic analgesics w/ the benzodiazepines prescribed by his PMD.   He is thinking of undergoing left NIKKO in Nov or Dec 2021.  Pt. also understands that NSAIDs remain absolutely contraindicated given his recent cardiac history.   RTC 2 weeks.    \par

## 2021-04-06 NOTE — HISTORY OF PRESENT ILLNESS
[FreeTextEntry1] : 65 y.o. M w/ h/o chronic bilateral hip pain 2' advanced femoroacetabular OA s/p R NIKKO (12/16/20) returns to office for f/u.  Pt. believes that his right sided hip pain is improving.  Has some persistent right sided lateral hip numbness. Pt. states that his left pain has improved s/p US guided left hip CSI (2/24/21).  Utox obtained last visit appropriate.  Pt. is maintained on oxycodone 5 mg; 2 tabs po tid which he tolerates well.

## 2021-04-16 ENCOUNTER — APPOINTMENT (OUTPATIENT)
Dept: FAMILY MEDICINE | Facility: CLINIC | Age: 66
End: 2021-04-16
Payer: MEDICAID

## 2021-04-16 VITALS
BODY MASS INDEX: 27.92 KG/M2 | OXYGEN SATURATION: 97 % | DIASTOLIC BLOOD PRESSURE: 80 MMHG | WEIGHT: 195 LBS | TEMPERATURE: 97.4 F | HEART RATE: 82 BPM | HEIGHT: 70 IN | SYSTOLIC BLOOD PRESSURE: 164 MMHG

## 2021-04-16 DIAGNOSIS — G47.00 INSOMNIA, UNSPECIFIED: ICD-10-CM

## 2021-04-16 DIAGNOSIS — F41.1 GENERALIZED ANXIETY DISORDER: ICD-10-CM

## 2021-04-16 DIAGNOSIS — N52.9 MALE ERECTILE DYSFUNCTION, UNSPECIFIED: ICD-10-CM

## 2021-04-16 PROCEDURE — 99072 ADDL SUPL MATRL&STAF TM PHE: CPT

## 2021-04-16 PROCEDURE — 99213 OFFICE O/P EST LOW 20 MIN: CPT

## 2021-04-16 RX ORDER — TADALAFIL 5 MG/1
5 TABLET ORAL
Qty: 30 | Refills: 0 | Status: DISCONTINUED | COMMUNITY
Start: 2021-01-26 | End: 2021-04-16

## 2021-04-16 RX ORDER — TADALAFIL 10 MG/1
10 TABLET, FILM COATED ORAL
Qty: 30 | Refills: 1 | Status: ACTIVE | COMMUNITY
Start: 2021-04-16 | End: 1900-01-01

## 2021-04-20 ENCOUNTER — APPOINTMENT (OUTPATIENT)
Dept: PHYSICAL MEDICINE AND REHAB | Facility: CLINIC | Age: 66
End: 2021-04-20
Payer: MEDICAID

## 2021-04-20 VITALS
BODY MASS INDEX: 27.92 KG/M2 | HEART RATE: 78 BPM | RESPIRATION RATE: 14 BRPM | HEIGHT: 70 IN | DIASTOLIC BLOOD PRESSURE: 106 MMHG | TEMPERATURE: 98.6 F | OXYGEN SATURATION: 98 % | SYSTOLIC BLOOD PRESSURE: 191 MMHG | WEIGHT: 195 LBS

## 2021-04-20 VITALS — TEMPERATURE: 98.6 F

## 2021-04-20 PROCEDURE — 99072 ADDL SUPL MATRL&STAF TM PHE: CPT

## 2021-04-20 PROCEDURE — 99214 OFFICE O/P EST MOD 30 MIN: CPT

## 2021-04-20 NOTE — PHYSICAL EXAM
[FreeTextEntry1] : NAD. \par A&Ox3\par Overweight but not obese\par No significant change in today's [id number]\par L-spine ROM - near full forward flexion w/o pain; 10-15' passive extension w/o pain\par RIGHT Hip -  improved ROM IR/ER w/o pain\par LEFT HIP - restricted IR/ER w/ pain (stable)\par Pelvic tilt ++ LEFT (not right)\par Seated slump - neg\par SEAN's - deferred\par MMT: 5/5 b/l HF/KE/DF/PF; 4-/5 RIGHT HIP ABDUCTORS - stable\par DTR's 2+ knees/ankles\par SILT\par

## 2021-04-20 NOTE — HISTORY OF PRESENT ILLNESS
[FreeTextEntry1] : 66 y.o. M w/ h/o chronic bilateral hip pain 2' advanced femoroacetabular OA s/p R NIKKO (12/16/20) returns to office for f/u and medication refill.  Pt. believes that his right sided hip pain continues to improve. Has some persistent right sided lateral hip numbness. Pt. states that his left pain has improved s/p US guided left hip CSI (2/24/21) but still feels it. Utox obtained last visit appropriate. Pt. is maintained on oxycodone 5 mg; 2 tabs po tid which he tolerates well.  He was Rx'd 5 tabs oxycodone last visit but finds himself taking 4-6 tabs OTC Tylenol per day.  Pain is worse when driving and working going up and down ladders.  \par

## 2021-04-20 NOTE — ASSESSMENT
[FreeTextEntry1] : 66 y.o. M w/ h/o chronic bilateral hip pain 2' advanced femoroacetabular OA s/p R NIKKO (12/16/20).  Pt. will continue w/ slow wean off his narcotic analgesics.  Will eRx oxycodone 5 mg; 1-2 tabs po tid (#35).   I-STOP database checked.  I also discussed option of buprenorphine again but he would need to go to pain management MD who takes his insurance.  I again reviewed R/B/A of narcotic analgesics including but not limited to respiratory depression, CNS depression, N/V/C, physiological and psychological addiction, paradoxical hyperalgesia and untimely death if not used as directed.  Pt. was again instructed not to take narcotic analgesics w/ the benzodiazepines prescribed by his PMD.   Pt. also understands that NSAIDs remain absolutely contraindicated given his recent cardiac history.   I further advised him to keep his dose of acetaminophen between 2-3 g/day.  Pt. still with very weak right hip abductors.   Advised patient to c/w HEP for strengthening and stabilization exercises.  He is thinking of undergoing left NIKKO in Nov or Dec 2021.  RTC 2 weeks.    \par

## 2021-05-04 ENCOUNTER — APPOINTMENT (OUTPATIENT)
Dept: PHYSICAL MEDICINE AND REHAB | Facility: CLINIC | Age: 66
End: 2021-05-04
Payer: MEDICAID

## 2021-05-04 ENCOUNTER — EMERGENCY (EMERGENCY)
Facility: HOSPITAL | Age: 66
LOS: 1 days | Discharge: HOME | End: 2021-05-04
Attending: EMERGENCY MEDICINE
Payer: MEDICAID

## 2021-05-04 VITALS
HEART RATE: 78 BPM | OXYGEN SATURATION: 99 % | DIASTOLIC BLOOD PRESSURE: 129 MMHG | WEIGHT: 210.1 LBS | TEMPERATURE: 98 F | RESPIRATION RATE: 20 BRPM | SYSTOLIC BLOOD PRESSURE: 210 MMHG | HEIGHT: 70 IN

## 2021-05-04 VITALS
OXYGEN SATURATION: 98 % | HEART RATE: 72 BPM | SYSTOLIC BLOOD PRESSURE: 180 MMHG | RESPIRATION RATE: 16 BRPM | DIASTOLIC BLOOD PRESSURE: 105 MMHG

## 2021-05-04 VITALS
OXYGEN SATURATION: 98 % | HEIGHT: 70 IN | SYSTOLIC BLOOD PRESSURE: 212 MMHG | HEART RATE: 73 BPM | TEMPERATURE: 98 F | WEIGHT: 195 LBS | DIASTOLIC BLOOD PRESSURE: 116 MMHG | BODY MASS INDEX: 27.92 KG/M2 | RESPIRATION RATE: 14 BRPM

## 2021-05-04 DIAGNOSIS — Z96.641 PRESENCE OF RIGHT ARTIFICIAL HIP JOINT: ICD-10-CM

## 2021-05-04 DIAGNOSIS — M47.816 SPONDYLOSIS W/OUT MYELOPATHY OR RADICULOPATHY, LUMBAR REGION: ICD-10-CM

## 2021-05-04 DIAGNOSIS — M25.559 PAIN IN UNSPECIFIED HIP: ICD-10-CM

## 2021-05-04 DIAGNOSIS — M16.0 BILATERAL PRIMARY OSTEOARTHRITIS OF HIP: ICD-10-CM

## 2021-05-04 DIAGNOSIS — Z95.5 PRESENCE OF CORONARY ANGIOPLASTY IMPLANT AND GRAFT: Chronic | ICD-10-CM

## 2021-05-04 DIAGNOSIS — Z98.890 OTHER SPECIFIED POSTPROCEDURAL STATES: Chronic | ICD-10-CM

## 2021-05-04 PROCEDURE — 99284 EMERGENCY DEPT VISIT MOD MDM: CPT

## 2021-05-04 PROCEDURE — 99214 OFFICE O/P EST MOD 30 MIN: CPT

## 2021-05-04 PROCEDURE — 93005 ELECTROCARDIOGRAM TRACING: CPT

## 2021-05-04 PROCEDURE — 93010 ELECTROCARDIOGRAM REPORT: CPT

## 2021-05-04 PROCEDURE — 99072 ADDL SUPL MATRL&STAF TM PHE: CPT

## 2021-05-04 PROCEDURE — 99283 EMERGENCY DEPT VISIT LOW MDM: CPT | Mod: 25

## 2021-05-04 PROCEDURE — 96372 THER/PROPH/DIAG INJ SC/IM: CPT

## 2021-05-04 RX ORDER — METOPROLOL TARTRATE 50 MG
25 TABLET ORAL ONCE
Refills: 0 | Status: COMPLETED | OUTPATIENT
Start: 2021-05-04 | End: 2021-05-04

## 2021-05-04 RX ORDER — KETOROLAC TROMETHAMINE 30 MG/ML
30 SYRINGE (ML) INJECTION ONCE
Refills: 0 | Status: DISCONTINUED | OUTPATIENT
Start: 2021-05-04 | End: 2021-05-04

## 2021-05-04 RX ORDER — AMLODIPINE BESYLATE 2.5 MG/1
5 TABLET ORAL ONCE
Refills: 0 | Status: COMPLETED | OUTPATIENT
Start: 2021-05-04 | End: 2021-05-04

## 2021-05-04 RX ORDER — SACUBITRIL AND VALSARTAN 24; 26 MG/1; MG/1
1 TABLET, FILM COATED ORAL ONCE
Refills: 0 | Status: COMPLETED | OUTPATIENT
Start: 2021-05-04 | End: 2021-05-04

## 2021-05-04 RX ORDER — OXYCODONE HYDROCHLORIDE 5 MG/1
10 TABLET ORAL ONCE
Refills: 0 | Status: DISCONTINUED | OUTPATIENT
Start: 2021-05-04 | End: 2021-05-04

## 2021-05-04 RX ADMIN — SACUBITRIL AND VALSARTAN 1 TABLET(S): 24; 26 TABLET, FILM COATED ORAL at 14:54

## 2021-05-04 RX ADMIN — Medication 25 MILLIGRAM(S): at 14:24

## 2021-05-04 RX ADMIN — OXYCODONE HYDROCHLORIDE 10 MILLIGRAM(S): 5 TABLET ORAL at 14:24

## 2021-05-04 RX ADMIN — AMLODIPINE BESYLATE 5 MILLIGRAM(S): 2.5 TABLET ORAL at 14:26

## 2021-05-04 RX ADMIN — Medication 30 MILLIGRAM(S): at 14:27

## 2021-05-04 NOTE — ED PROVIDER NOTE - PATIENT PORTAL LINK FT
You can access the FollowMyHealth Patient Portal offered by [facility] [place] by registering at the following website: [email or web address]. By joining [facility]’s FollowMyHealth portal, you will also be able to view your health information using other applications (apps) compatible with our system.

## 2021-05-04 NOTE — ED PROVIDER NOTE - CLINICAL SUMMARY MEDICAL DECISION MAKING FREE TEXT BOX
66 year old  male with PMH HTN, high cholesterol, CAD and MI with 2 stents (Dec. 2019)., Anxiety, right hip replacement, sent from routine orthopedics visit to ED for elevated BP reading. Asymptomatic. Given home meds that he missed.  Patient's BP improved and H&P, EKG is unremarkable for any emergent process.   Patient/family was given full return precautions.  Patient was counseled on red flag syndromes such as fever, severe pain, or focal deficits and advised to return to the ED for these reasons or any reason that was concerning to them.  Patient/ family advised to make close follow up with their primary care provider and specialty clinics (as applicable) to follow up with this visit and continue investigation/treatment.  All questions were answered. Patient/family has shown adequate competence and understanding. Patient/family is agreeable to the plan.

## 2021-05-04 NOTE — ED ADULT TRIAGE NOTE - CHIEF COMPLAINT QUOTE
pt was seen in outpatient ortho, pt states he forgot to take his medication today and yesterday. pt aox4, no complaints at this time. denies dizziness, chest pain, SOB

## 2021-05-04 NOTE — PHYSICAL EXAM
[FreeTextEntry1] : NAD. \par A&Ox3\par Overweight but not obese\par No significant change in today's [id number]\par L-spine ROM - near full forward flexion w/o pain; 10-15' passive extension w/o pain\par RIGHT Hip -  improved ROM IR/ER w/o pain\par LEFT HIP - restricted IR/ER w/ pain (stable)\par Pelvic tilt ++ LEFT (not right)\par Seated slump - neg\par SEAN's - deferred\par MMT: 5/5 b/l HF/KE/DF/PF; 4-/5 RIGHT HIP ABDUCTORS - stable\par DTR's 2+ knees/ankles\par SILT\par

## 2021-05-04 NOTE — ED ADULT NURSE NOTE - OBJECTIVE STATEMENT
Pt sent from pain management MD for evaluation of /130, pt denies any symptoms at this time. Pt did not take his BP meds last night or this morning. pt has a hx of cardiac arrest with 2 stents. Breathing is even and unlabored, denies SOB, numbing/ tingling, NV. Denies GI/ symptoms. Pt reports being up 3 nights in a row due to increased hip pain.

## 2021-05-04 NOTE — ASSESSMENT
[FreeTextEntry1] : 66 y.o. M w/ h/o chronic bilateral hip pain 2' advanced femoroacetabular OA s/p R NIKKO (12/16/20).  I spent most of today's visit (25 min) discussing further non-operative vs. operative management of the patient's painful MSK condition.  Most importantly, I expressed my concern regarding the patient's dangerously high BP which places him at increased risk for 2nd MI, CVA or clot.  I advised pt. to go to [facility] ED immediately after leaving office (we will call ambulance).  My office will contact his PMD to appraise him of the situation.  As I have been unable to manage the patient's pain successfully since his NIKKO, I believe that it is in the patient's best interest to consult one of my colleagues in chronic pain management.  We have previously discussed buprenorphine which may an appropriate option for him given some aberrant drug seeking behavior.  I will prescribe him a short course of Percocet 10/325 mg; one tab po tid prn # 21 to hold him over until he can be seen by chronic pain MD.  I-STOP database checked.  I again reviewed R/B/A of narcotic analgesics including but not limited to respiratory depression, CNS depression, N/V/C, physiological and psychological addiction, paradoxical hyperalgesia and untimely death if not used as directed.  Pt. was again instructed not to take narcotic analgesics w/ the benzodiazepines prescribed by his PMD.  Pt. also understands that NSAIDs remain absolutely contraindicated given his cardiac history.  I again advised him to keep his dose of acetaminophen between 2-3 g/day.  RTC prn.  \par

## 2021-05-04 NOTE — ED ADULT NURSE NOTE - NS ED NURSE RECORD ANOTHER HT AND WT
Yes
Review of Systems  Constitutional:  No fever, chills.  MS:  (+) left foot pain  Skin:  No skin rash, pruritis, jaundice, or lesions.  Neuro:  No headache, dizziness, loss of sensation, or focal weakness.  No change in mental status.   Endocrine: No history of thyroid disease or diabetes.

## 2021-05-04 NOTE — HISTORY OF PRESENT ILLNESS
[FreeTextEntry1] : 66 y.o. M w/ h/o chronic bilateral hip pain 2' advanced femoroacetabular OA s/p R NIKKO (12/16/20) returns to office for f/u.  Pt. states that he has not slept for 3 days.  He describes increasing left sided hip pain "much worse than right hip has ever been".  He ran out of his oxycodone and took few pills Percocet from his wife.  Pt. is maintained on oxycodone 5 mg; 2 tabs po tid which he has tolerated well but now reporting dyspepsia.  He was Rx'd 5 tabs oxycodone last visit but finds himself taking 8-9 tabs OTC Tylenol per day. Pain is worse when driving and working and going up and down ladders. \par

## 2021-05-04 NOTE — ED PROVIDER NOTE - OBJECTIVE STATEMENT
66 year old  male with PMH HTN, high cholesterol, CAD and MI with 2 stents (Dec. 2019)., Anxiety, right hip replacement, sent from routine orthopedics visit to ED for elevated BP reading.  Patient admits he did not take his meds this AM or last night.  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  He reports some left hip pain which was the reason for the ortho visit, otherwise he denies any symptoms. Otherwise denies head pain, bleeding, SOB, chest pain, abdominal pain or fevers.  Denies other pertinent medical problems.  Denies any overt substance use.

## 2021-05-04 NOTE — ED PROVIDER NOTE - ATTENDING CONTRIBUTION TO CARE
seen with resident: solomon, adult male with asymptomatic hypertension noted on outpt ortho visit for hip osteoarthritiswith assoc pain, poor compliance with meds last 2 days; on exam, NAD, well appearing, no jvd; normal heart and lung sounds; abd soft nt nd; no pedal edema; ECG unchanged; given normal home dose of meds with improvement in BP; ok for d/c with outpt f/u, encouraged to continue meds as prescribed

## 2021-05-07 ENCOUNTER — NON-APPOINTMENT (OUTPATIENT)
Age: 66
End: 2021-05-07

## 2021-05-10 RX ORDER — OXYCODONE AND ACETAMINOPHEN 10; 325 MG/1; MG/1
10-325 TABLET ORAL
Qty: 42 | Refills: 0 | Status: ACTIVE | COMMUNITY
Start: 2021-05-04 | End: 1900-01-01

## 2021-05-18 ENCOUNTER — APPOINTMENT (OUTPATIENT)
Dept: PHYSICAL MEDICINE AND REHAB | Facility: CLINIC | Age: 66
End: 2021-05-18

## 2021-05-25 ENCOUNTER — APPOINTMENT (OUTPATIENT)
Dept: ORTHOPEDIC SURGERY | Facility: CLINIC | Age: 66
End: 2021-05-25
Payer: MEDICAID

## 2021-05-25 VITALS
HEIGHT: 70 IN | DIASTOLIC BLOOD PRESSURE: 122 MMHG | SYSTOLIC BLOOD PRESSURE: 200 MMHG | HEART RATE: 67 BPM | WEIGHT: 195 LBS | BODY MASS INDEX: 27.92 KG/M2

## 2021-05-25 DIAGNOSIS — M16.12 UNILATERAL PRIMARY OSTEOARTHRITIS, LEFT HIP: ICD-10-CM

## 2021-05-25 PROCEDURE — 73502 X-RAY EXAM HIP UNI 2-3 VIEWS: CPT | Mod: LT

## 2021-05-25 PROCEDURE — 99215 OFFICE O/P EST HI 40 MIN: CPT

## 2021-05-25 PROCEDURE — 99072 ADDL SUPL MATRL&STAF TM PHE: CPT

## 2021-05-25 RX ORDER — OXYCODONE 5 MG/1
5 TABLET ORAL
Qty: 42 | Refills: 0 | Status: DISCONTINUED | COMMUNITY
Start: 2020-12-23 | End: 2021-05-25

## 2021-05-25 RX ORDER — OXYCODONE 10 MG/1
10 TABLET ORAL TWICE DAILY
Qty: 12 | Refills: 0 | Status: DISCONTINUED | COMMUNITY
Start: 2019-12-10 | End: 2021-05-25

## 2021-05-25 RX ORDER — OXYCODONE 10 MG/1
10 TABLET ORAL
Qty: 21 | Refills: 0 | Status: DISCONTINUED | COMMUNITY
Start: 2019-11-26 | End: 2021-05-25

## 2021-05-25 RX ORDER — OXYCODONE 5 MG/1
5 TABLET ORAL
Qty: 35 | Refills: 0 | Status: DISCONTINUED | COMMUNITY
Start: 2019-11-12 | End: 2021-05-25

## 2021-05-25 NOTE — DISCUSSION/SUMMARY
[de-identified] : Mike is a 66 year old male who presents with severe arthritis of the left hip.  His right hip is doing well.  He is status post right hip replacement in December 2020.  Based upon the patients continued symptoms and failure to respond to conservative treatment I have recommended a left total hip replacement for the patient. A discussion was had with the patient regarding a left total hip replacement. Anterior and posterior exposures were discussed. The patient would like to proceed with an anterior approach. A long discussion was had with the patient as what the total joint replacement would entail. A model was used to demonstrate the operation and to discuss bearing surfaces of the implants. The hospitalization and rehabilitation were discussed. The use of perioperative antibiotics and DVT prophylaxis were discussed. The risks, benefits and alternatives to surgical intervention were discussed at length with the patient. Specific risks discussed included: infection, wound breakdown, numbness and damage to nerves, tendon, muscle, arteries or other blood vessels, and the possibility of leg length discrepancy. The possibility of recurrent pain, no improvement in pain and actual worsening of the pain were also mentioned in conversation with the patient. Medical complications related to the patient's general medical health including deep vein thrombosis, pulmonary embolism, heart attack, stroke, death and other complications from anesthesia were discussed as well. The patient was told that we will take steps to minimize these risks by using sterile technique, antibiotics and DVT prophylaxis when appropriate and following the patient postoperatively in the clinic setting to monitor progress. The benefits of surgery were discussed with the patient including the potential to improve the current clinical condition through operative intervention. Alternatives to surgical intervention include continued conservative management which may yield less than optimal results in this particular patient. All questions were answered to the satisfaction of the patient. Models were used as an educational tool. We did discuss implant choices and fixation, with shared decision making with the patient.\par  \par We are planning for robotic assistance for the total hip replacement. We discussed that this will require placement of iliac crest pins in the contralateral iliac crest for pelvic bone registration to allow for robotic guidance for the surgery. We will utilize robotic assistance to improve accuracy of the implants, and accurate restoration of both leg lengths and femoral offset.

## 2021-05-25 NOTE — HISTORY OF PRESENT ILLNESS
[de-identified] : Patient 66-year-old male status post right total hip replacement 12/16/2020, presenting for worsening left hip pain.  His left hip pain is localized to left groin is worse with weightbearing typically walking distance, rising position, and using stairs.  He notes worsening stiffness of the hip which is now interfering with ADLs such as putting on socks and shoes.  He has been diagnosed with left hip osteoarthritis in the past and treated conservatively thus far.  Has tried therapy and exercise without relief.  He takes Percocet 10/3/2025 3 times daily for pain as per pain management.  He is seeing Dr. Vogt, who recommended an ultrasound-guided intra-articular injection to the left hip in February 2021.  Patient went for this and states he received only mild temporary relief but now this is worn off.  He is aware he needs electrolyte replacement presents today to discuss this further.\par Past medical history includes MI in December 2019 resulting in 2 stents, now on Brilinta and aspirin.

## 2021-05-25 NOTE — REASON FOR VISIT
[Follow-Up Visit] : a follow-up visit for [Other: ____] : [unfilled] [FreeTextEntry2] : S/P Right robotic-assisted anterior approach THR with Rober, DOS: 12/16/20. \par  \par

## 2021-05-25 NOTE — PHYSICAL EXAM
[de-identified] : The patient appears well nourished  and in no apparent distress.  The patient is alert and oriented to person, place, and time.   Affect and mood appear normal. The head is normocephalic and atraumatic.  The eyes reveal normal sclera and extra ocular muscles are intact. The tongue is midline with no apparent lesions.  Skin shows normal turgor with no evidence of eczema or psoriasis.  No respiratory distress noted.  Sensation grossly intact.  [de-identified] : Pain limits his left hip exam.  Hip external rotation of 25 degrees, internal rotation of 10 degrees, hip flexion of 80 degrees, all with extreme pain. [de-identified] : X-ray AP pelvis and 2 views left hip shows end-stage bone-on-bone left hip arthritis.  Right hip replacement appears well fixed on the AP pelvis view.

## 2021-05-30 NOTE — HISTORY OF PRESENT ILLNESS
[FreeTextEntry1] : 64 y.o. M w/ advanced hip OA returns to office for f/u.  At last visit, pt's BP was very elevated.  He states that he suffered an MI w/i 2 hours after he left office.  Pt. was BIBA to [facility] w/ ? cardiac arrest.  He underwent PCI w/ 2 stents.  Pt. was diagnosed w/ HF and started on Entresto, carvedilol, spironolactone, Brilinta and ASA 81 mg.  Cardiology f/u w/ Harry Santana.  Still on oxycodone 10 mg; one tabl po bid.  Tolerating medication well.  \par  biba for allergic reaction x 30 min. Patient has known allergy to walnuts and peanuts. No tongue or lip swelling, no hives. Admits to tingling sensation in the tongue.

## 2021-06-01 ENCOUNTER — APPOINTMENT (OUTPATIENT)
Dept: PHYSICAL MEDICINE AND REHAB | Facility: CLINIC | Age: 66
End: 2021-06-01

## 2021-07-09 ENCOUNTER — APPOINTMENT (OUTPATIENT)
Dept: FAMILY MEDICINE | Facility: CLINIC | Age: 66
End: 2021-07-09

## 2021-07-22 ENCOUNTER — OUTPATIENT (OUTPATIENT)
Dept: OUTPATIENT SERVICES | Facility: HOSPITAL | Age: 66
LOS: 1 days | End: 2021-07-22
Payer: MEDICAID

## 2021-07-22 VITALS
DIASTOLIC BLOOD PRESSURE: 104 MMHG | HEIGHT: 70 IN | TEMPERATURE: 97 F | RESPIRATION RATE: 16 BRPM | WEIGHT: 210.98 LBS | HEART RATE: 60 BPM | SYSTOLIC BLOOD PRESSURE: 150 MMHG

## 2021-07-22 DIAGNOSIS — Z96.641 PRESENCE OF RIGHT ARTIFICIAL HIP JOINT: Chronic | ICD-10-CM

## 2021-07-22 DIAGNOSIS — I10 ESSENTIAL (PRIMARY) HYPERTENSION: ICD-10-CM

## 2021-07-22 DIAGNOSIS — M16.12 UNILATERAL PRIMARY OSTEOARTHRITIS, LEFT HIP: ICD-10-CM

## 2021-07-22 DIAGNOSIS — Z98.890 OTHER SPECIFIED POSTPROCEDURAL STATES: Chronic | ICD-10-CM

## 2021-07-22 DIAGNOSIS — Z29.9 ENCOUNTER FOR PROPHYLACTIC MEASURES, UNSPECIFIED: ICD-10-CM

## 2021-07-22 DIAGNOSIS — Z01.818 ENCOUNTER FOR OTHER PREPROCEDURAL EXAMINATION: ICD-10-CM

## 2021-07-22 DIAGNOSIS — Z95.5 PRESENCE OF CORONARY ANGIOPLASTY IMPLANT AND GRAFT: Chronic | ICD-10-CM

## 2021-07-22 LAB
A1C WITH ESTIMATED AVERAGE GLUCOSE RESULT: 5.8 % — HIGH (ref 4–5.6)
ALBUMIN SERPL ELPH-MCNC: 4.7 G/DL — SIGNIFICANT CHANGE UP (ref 3.3–5.2)
ALP SERPL-CCNC: 65 U/L — SIGNIFICANT CHANGE UP (ref 40–120)
ALT FLD-CCNC: 28 U/L — SIGNIFICANT CHANGE UP
ANION GAP SERPL CALC-SCNC: 12 MMOL/L — SIGNIFICANT CHANGE UP (ref 5–17)
APTT BLD: 30 SEC — SIGNIFICANT CHANGE UP (ref 27.5–35.5)
AST SERPL-CCNC: 29 U/L — SIGNIFICANT CHANGE UP
BASOPHILS # BLD AUTO: 0.05 K/UL — SIGNIFICANT CHANGE UP (ref 0–0.2)
BASOPHILS NFR BLD AUTO: 0.8 % — SIGNIFICANT CHANGE UP (ref 0–2)
BILIRUB SERPL-MCNC: 0.4 MG/DL — SIGNIFICANT CHANGE UP (ref 0.4–2)
BLD GP AB SCN SERPL QL: SIGNIFICANT CHANGE UP
BUN SERPL-MCNC: 14.4 MG/DL — SIGNIFICANT CHANGE UP (ref 8–20)
CALCIUM SERPL-MCNC: 9.6 MG/DL — SIGNIFICANT CHANGE UP (ref 8.6–10.2)
CHLORIDE SERPL-SCNC: 101 MMOL/L — SIGNIFICANT CHANGE UP (ref 98–107)
CO2 SERPL-SCNC: 27 MMOL/L — SIGNIFICANT CHANGE UP (ref 22–29)
CREAT SERPL-MCNC: 0.82 MG/DL — SIGNIFICANT CHANGE UP (ref 0.5–1.3)
EOSINOPHIL # BLD AUTO: 0.13 K/UL — SIGNIFICANT CHANGE UP (ref 0–0.5)
EOSINOPHIL NFR BLD AUTO: 2 % — SIGNIFICANT CHANGE UP (ref 0–6)
ESTIMATED AVERAGE GLUCOSE: 120 MG/DL — HIGH (ref 68–114)
GLUCOSE SERPL-MCNC: 139 MG/DL — HIGH (ref 70–99)
HCT VFR BLD CALC: 43.6 % — SIGNIFICANT CHANGE UP (ref 39–50)
HGB BLD-MCNC: 14.3 G/DL — SIGNIFICANT CHANGE UP (ref 13–17)
IMM GRANULOCYTES NFR BLD AUTO: 0.3 % — SIGNIFICANT CHANGE UP (ref 0–1.5)
INR BLD: 0.96 RATIO — SIGNIFICANT CHANGE UP (ref 0.88–1.16)
LYMPHOCYTES # BLD AUTO: 1.16 K/UL — SIGNIFICANT CHANGE UP (ref 1–3.3)
LYMPHOCYTES # BLD AUTO: 17.7 % — SIGNIFICANT CHANGE UP (ref 13–44)
MCHC RBC-ENTMCNC: 32.8 GM/DL — SIGNIFICANT CHANGE UP (ref 32–36)
MCHC RBC-ENTMCNC: 32.9 PG — SIGNIFICANT CHANGE UP (ref 27–34)
MCV RBC AUTO: 100.5 FL — HIGH (ref 80–100)
MONOCYTES # BLD AUTO: 0.62 K/UL — SIGNIFICANT CHANGE UP (ref 0–0.9)
MONOCYTES NFR BLD AUTO: 9.5 % — SIGNIFICANT CHANGE UP (ref 2–14)
MRSA PCR RESULT.: SIGNIFICANT CHANGE UP
NEUTROPHILS # BLD AUTO: 4.57 K/UL — SIGNIFICANT CHANGE UP (ref 1.8–7.4)
NEUTROPHILS NFR BLD AUTO: 69.7 % — SIGNIFICANT CHANGE UP (ref 43–77)
PLATELET # BLD AUTO: 201 K/UL — SIGNIFICANT CHANGE UP (ref 150–400)
POTASSIUM SERPL-MCNC: 4.5 MMOL/L — SIGNIFICANT CHANGE UP (ref 3.5–5.3)
POTASSIUM SERPL-SCNC: 4.5 MMOL/L — SIGNIFICANT CHANGE UP (ref 3.5–5.3)
PROT SERPL-MCNC: 7.8 G/DL — SIGNIFICANT CHANGE UP (ref 6.6–8.7)
PROTHROM AB SERPL-ACNC: 11.2 SEC — SIGNIFICANT CHANGE UP (ref 10.6–13.6)
RBC # BLD: 4.34 M/UL — SIGNIFICANT CHANGE UP (ref 4.2–5.8)
RBC # FLD: 12.9 % — SIGNIFICANT CHANGE UP (ref 10.3–14.5)
S AUREUS DNA NOSE QL NAA+PROBE: SIGNIFICANT CHANGE UP
SODIUM SERPL-SCNC: 140 MMOL/L — SIGNIFICANT CHANGE UP (ref 135–145)
WBC # BLD: 6.55 K/UL — SIGNIFICANT CHANGE UP (ref 3.8–10.5)
WBC # FLD AUTO: 6.55 K/UL — SIGNIFICANT CHANGE UP (ref 3.8–10.5)

## 2021-07-22 PROCEDURE — 93005 ELECTROCARDIOGRAM TRACING: CPT

## 2021-07-22 PROCEDURE — 93010 ELECTROCARDIOGRAM REPORT: CPT

## 2021-07-22 PROCEDURE — G0463: CPT

## 2021-07-22 RX ORDER — AMLODIPINE BESYLATE 2.5 MG/1
1 TABLET ORAL
Qty: 0 | Refills: 0 | DISCHARGE

## 2021-07-22 RX ORDER — METOPROLOL TARTRATE 50 MG
1 TABLET ORAL
Qty: 0 | Refills: 0 | DISCHARGE

## 2021-07-22 RX ORDER — ATORVASTATIN CALCIUM 80 MG/1
1 TABLET, FILM COATED ORAL
Qty: 0 | Refills: 0 | DISCHARGE

## 2021-07-22 NOTE — H&P PST ADULT - ASSESSMENT
67 yo M PMH of HTN, HLD, CAD, MI in 2019 with 2 stents (on Brilinta, aspirin), OA on bilateral hips s/p right THR 2020, presents with c/o worsening left hip pain. His left hip pain is localized to left groin is worse with weightbearing typically walking distance, rising position, and using stairs. He notes worsening stiffness of the hip which is now interfering with ADLs such as putting on socks and shoes. He has been diagnosed with left hip osteoarthritis in the past and treated conservatively thus far. Has tried therapy and exercise without relief. He takes Percocet 10/3/2025 3 times daily for pain as per pain management. He had an ultrasound-guided intra-articular injection to the left hip in 2021 with patient reporting only mild temporary relief that has worn off. Preop assessment prior to left THR w/Dr Marina on 21    Pt was educated on preop preparation with written and verbal instructions. Pt was informed to obtain clearances >3 days before surgery. Pt will ask his cardiologist when to stop aspirin and Brilinta. Pt will review medications with PCP. Pt was educated on NSAIDs, multivitamins and herbals that increase the risk of bleeding and need to be stopped 7 days before procedure. Pt was educated on covid testing and covid prevention, i.e. social distancing, handwashing, mask wearing. Pt verbalized understanding of the above.     OPIOID RISK TOOL    QIANA EACH BOX THAT APPLIES AND ADD TOTALS AT THE END    FAMILY HISTORY OF SUBSTANCE ABUSE                 FEMALE         MALE                                                Alcohol                             [  ]1 pt          [  ]3pts                                               Illegal Durgs                     [  ]2 pts        [  ]3pts                                               Rx Drugs                           [  ]4 pts        [  ]4 pts    PERSONAL HISTORY OF SUBSTANCE ABUSE                                                                                          Alcohol                             [  ]3 pts       [  ]3 pts                                               Illegal Drugs                     [  ]4 pts        [  ]4 pts                                               Rx Drugs                           [  ]5 pts        [  ]5 pts    AGE BETWEEN 16-45 YEARS                                      [  ]1 pt         [  ]1 pt    HISTORY OF PREADOLESCENT   SEXUAL ABUSE                                                             [  ]3 pts        [  ]0pts    PSYCHOLOGICAL DISEASE                     ADD, OCD, Bipolar, Schizophrenia        [  ]2 pts         [  ]2 pts                      Depression                                               [  ]1 pt           [  ]1 pt           SCORING TOTAL   (add numbers and type here)              ( 0 )                                     A score of 3 or lower indicated LOW risk for future opioid abuse  A score of 4 to 7 indicated moderate risk for future opioid abuse  A score of 8 or higher indicates a high risk for opioid abuse    CAPRINI VTE 2.0 SCORE [CLOT updated 2019]    AGE RELATED RISK FACTORS                                                       MOBILITY RELATED FACTORS  [ ] Age 41-60 years                                            (1 Point)                    [ ] Bed rest                                                        (1 Point)  [x ] Age: 61-74 years                                           (2 Points)                  [ ] Plaster cast                                                   (2 Points)  [ ] Age= 75 years                                              (3 Points)                    [ ] Bed bound for more than 72 hours                 (2 Points)    DISEASE RELATED RISK FACTORS                                               GENDER SPECIFIC FACTORS  [ ] Edema in the lower extremities                       (1 Point)              [ ] Pregnancy                                                     (1 Point)  [ ] Varicose veins                                               (1 Point)                     [ ] Post-partum < 6 weeks                                   (1 Point)             [ x] BMI > 25 Kg/m2                                            (1 Point)                     [ ] Hormonal therapy  or oral contraception          (1 Point)                 [ ] Sepsis (in the previous month)                        (1 Point)               [ ] History of pregnancy complications                 (1 point)  [ ] Pneumonia or serious lung disease                                               [ ] Unexplained or recurrent                     (1 Point)           (in the previous month)                               (1 Point)  [ ] Abnormal pulmonary function test                     (1 Point)                 SURGERY RELATED RISK FACTORS  [ ] Acute myocardial infarction                              (1 Point)               [ ]  Section                                             (1 Point)  [ ] Congestive heart failure (in the previous month)  (1 Point)      [ ] Minor surgery                                                  (1 Point)   [ ] Inflammatory bowel disease                             (1 Point)               [ ] Arthroscopic surgery                                        (2 Points)  [ ] Central venous access                                      (2 Points)                [ ] General surgery lasting more than 45 minutes (2 points)  [ ] Malignancy- Present or previous                   (2 Points)                [x ] Elective arthroplasty                                         (5 points)    [ ] Stroke (in the previous month)                          (5 Points)                                                                                                                                                           HEMATOLOGY RELATED FACTORS                                                 TRAUMA RELATED RISK FACTORS  [ ] Prior episodes of VTE                                     (3 Points)                [ ] Fracture of the hip, pelvis, or leg                       (5 Points)  [ ] Positive family history for VTE                         (3 Points)             [ ] Acute spinal cord injury (in the previous month)  (5 Points)  [ ] Prothrombin 53051 A                                     (3 Points)               [ ] Paralysis  (less than 1 month)                             (5 Points)  [ ] Factor V Leiden                                             (3 Points)                  [ ] Multiple Trauma within 1 month                        (5 Points)  [ ] Lupus anticoagulants                                     (3 Points)                                                           [ ] Anticardiolipin antibodies                               (3 Points)                                                       [ ] High homocysteine in the blood                      (3 Points)                                             [ ] Other congenital or acquired thrombophilia      (3 Points)                                                [ ] Heparin induced thrombocytopenia                  (3 Points)                                     Total Score [     8     ] 65 yo M PMH of HTN, HLD, CAD, MI in 2019 with 2 stents (on Brilinta), OA in bilateral hips s/p right THR 2020, presents with c/o worsening left hip pain. His left hip pain is localized to left groin is worse with weightbearing typically walking distance, rising position, and using stairs. He notes worsening stiffness of the hip which is now interfering with ADLs such as putting on socks and shoes. He has been diagnosed with left hip osteoarthritis in the past and treated conservatively thus far. Has tried therapy and exercise without relief. He takes Percocet 3 times daily for pain as per pain management. He had an ultrasound-guided intra-articular injection to the left hip in 2021 with patient reporting only mild temporary relief that has worn off. He started ambulating with a cane a few days ago. Preop assessment prior to left THR w/Dr Marina on 21    Pt was educated on preop preparation with written and verbal instructions. Pt was informed to obtain clearances >3 days before surgery. Pt will ask his cardiologist when to stop Brilinta. Pt will review medications with PCP. Pt was educated on NSAIDs, multivitamins and herbals that increase the risk of bleeding and need to be stopped 7 days before procedure. Pt was educated on covid testing and covid prevention, i.e. social distancing, handwashing, mask wearing. Pt verbalized understanding of the above.     OPIOID RISK TOOL    QIANA EACH BOX THAT APPLIES AND ADD TOTALS AT THE END    FAMILY HISTORY OF SUBSTANCE ABUSE                 FEMALE         MALE                                                Alcohol                             [  ]1 pt          [  ]3pts                                               Illegal Durgs                     [  ]2 pts        [  ]3pts                                               Rx Drugs                           [  ]4 pts        [  ]4 pts    PERSONAL HISTORY OF SUBSTANCE ABUSE                                                                                          Alcohol                             [  ]3 pts       [  ]3 pts                                               Illegal Drugs                     [  ]4 pts        [  ]4 pts                                               Rx Drugs                           [  ]5 pts        [  ]5 pts    AGE BETWEEN 16-45 YEARS                                      [  ]1 pt         [  ]1 pt    HISTORY OF PREADOLESCENT   SEXUAL ABUSE                                                             [  ]3 pts        [  ]0pts    PSYCHOLOGICAL DISEASE                     ADD, OCD, Bipolar, Schizophrenia        [  ]2 pts         [  ]2 pts                      Depression                                               [  ]1 pt           [  ]1 pt           SCORING TOTAL   (add numbers and type here)              ( 0 )                                     A score of 3 or lower indicated LOW risk for future opioid abuse  A score of 4 to 7 indicated moderate risk for future opioid abuse  A score of 8 or higher indicates a high risk for opioid abuse    CAPRINI VTE 2.0 SCORE [CLOT updated 2019]    AGE RELATED RISK FACTORS                                                       MOBILITY RELATED FACTORS  [ ] Age 41-60 years                                            (1 Point)                    [ ] Bed rest                                                        (1 Point)  [x ] Age: 61-74 years                                           (2 Points)                  [ ] Plaster cast                                                   (2 Points)  [ ] Age= 75 years                                              (3 Points)                    [ ] Bed bound for more than 72 hours                 (2 Points)    DISEASE RELATED RISK FACTORS                                               GENDER SPECIFIC FACTORS  [ ] Edema in the lower extremities                       (1 Point)              [ ] Pregnancy                                                     (1 Point)  [ ] Varicose veins                                               (1 Point)                     [ ] Post-partum < 6 weeks                                   (1 Point)             [ x] BMI > 25 Kg/m2                                            (1 Point)                     [ ] Hormonal therapy  or oral contraception          (1 Point)                 [ ] Sepsis (in the previous month)                        (1 Point)               [ ] History of pregnancy complications                 (1 point)  [ ] Pneumonia or serious lung disease                                               [ ] Unexplained or recurrent                     (1 Point)           (in the previous month)                               (1 Point)  [ ] Abnormal pulmonary function test                     (1 Point)                 SURGERY RELATED RISK FACTORS  [ ] Acute myocardial infarction                              (1 Point)               [ ]  Section                                             (1 Point)  [ ] Congestive heart failure (in the previous month)  (1 Point)      [ ] Minor surgery                                                  (1 Point)   [ ] Inflammatory bowel disease                             (1 Point)               [ ] Arthroscopic surgery                                        (2 Points)  [ ] Central venous access                                      (2 Points)                [ ] General surgery lasting more than 45 minutes (2 points)  [ ] Malignancy- Present or previous                   (2 Points)                [x ] Elective arthroplasty                                         (5 points)    [ ] Stroke (in the previous month)                          (5 Points)                                                                                                                                                           HEMATOLOGY RELATED FACTORS                                                 TRAUMA RELATED RISK FACTORS  [ ] Prior episodes of VTE                                     (3 Points)                [ ] Fracture of the hip, pelvis, or leg                       (5 Points)  [ ] Positive family history for VTE                         (3 Points)             [ ] Acute spinal cord injury (in the previous month)  (5 Points)  [ ] Prothrombin 62086 A                                     (3 Points)               [ ] Paralysis  (less than 1 month)                             (5 Points)  [ ] Factor V Leiden                                             (3 Points)                  [ ] Multiple Trauma within 1 month                        (5 Points)  [ ] Lupus anticoagulants                                     (3 Points)                                                           [ ] Anticardiolipin antibodies                               (3 Points)                                                       [ ] High homocysteine in the blood                      (3 Points)                                             [ ] Other congenital or acquired thrombophilia      (3 Points)                                                [ ] Heparin induced thrombocytopenia                  (3 Points)                                     Total Score [     8     ]

## 2021-07-22 NOTE — H&P PST ADULT - NSICDXFAMILYHX_GEN_ALL_CORE_FT
FAMILY HISTORY:  Alcoholism in family member, brother  FH: liver cancer, brother  FH: pancreatic cancer, father     FAMILY HISTORY:  Alcoholism in family member, brother  FH: liver cancer, brother  FH: pancreatic cancer, father    Father  Still living? Unknown  Family history of bone cancer, Age at diagnosis: Age Unknown

## 2021-07-22 NOTE — H&P PST ADULT - NSICDXPASTMEDICALHX_GEN_ALL_CORE_FT
PAST MEDICAL HISTORY:  Back pain     CAD (coronary artery disease)     H/O heart artery stent     High cholesterol     Hip arthritis     HTN (hypertension)     Left lumbar radiculopathy     Myocardial infarct 12/2019, stents x2    Unilateral primary osteoarthritis, left hip

## 2021-07-22 NOTE — H&P PST ADULT - NSICDXPASTSURGICALHX_GEN_ALL_CORE_FT
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

## 2021-07-22 NOTE — H&P PST ADULT - EKG AND INTERPRETATION
NSR, HR , official reading pending sinus bradycardia, HR 51, inferior infarct age undetermined, official reading pending

## 2021-07-22 NOTE — H&P PST ADULT - HISTORY OF PRESENT ILLNESS
65 yo M PMH of HTN, HLD, CAD, MI in 2019 with 2 stents (on Brilinta, aspirin), OA on bilateral hips s/p right THR 12/16/2020, presents with c/o worsening left hip pain. His left hip pain is localized to left groin is worse with weightbearing typically walking distance, rising position, and using stairs. He notes worsening stiffness of the hip which is now interfering with ADLs such as putting on socks and shoes. He has been diagnosed with left hip osteoarthritis in the past and treated conservatively thus far. Has tried therapy and exercise without relief. He takes Percocet 10/3/2025 3 times daily for pain as per pain management. He had an ultrasound-guided intra-articular injection to the left hip in February 2021 with patient reporting only mild temporary relief that has worn off. Preop assessment prior to left THR w/Dr Marina on 8/11/21    Completed COVID 19 vaccination: Pfizer x2 (3/20/21)           67 yo M PMH of HTN, HLD, CAD, MI in 2019 with 2 stents (on Brilinta), OA in bilateral hips s/p right THR 12/16/2020, presents with c/o worsening left hip pain. His left hip pain is localized to left groin is worse with weightbearing typically walking distance, rising position, and using stairs. He notes worsening stiffness of the hip which is now interfering with ADLs such as putting on socks and shoes. He has been diagnosed with left hip osteoarthritis in the past and treated conservatively thus far. Has tried therapy and exercise without relief. He takes Percocet 3 times daily for pain as per pain management. He had an ultrasound-guided intra-articular injection to the left hip in February 2021 with patient reporting only mild temporary relief that has worn off. He started ambulating with a cane a few days ago. Preop assessment prior to left THR w/Dr Marina on 8/11/21    Completed COVID 19 vaccination: Pfizer x2 (3/20/21)

## 2021-07-22 NOTE — PATIENT PROFILE ADULT - NSPREOP1_INFOGIVENTO_GEN_A_NUR
Additional Notes: Well-healed scar without evidence of recurrence.\\nNo clinical LAD.\\nRec annual dilated eye exam, dental exam, and FBSE.\\nRTC 6 months.
patient

## 2021-07-22 NOTE — H&P PST ADULT - NSICDXPROBLEM_GEN_ALL_CORE_FT
PROBLEM DIAGNOSES  Problem: HTN (hypertension)  Assessment and Plan: preop assessment, follow with PCP, medical and cardiac clearance pending     Problem: Unilateral primary osteoarthritis, left hip  Assessment and Plan: preop assessment, medical and cardiac clearance pending, left THR on 8/11/21    Problem: Need for prophylactic measure  Assessment and Plan: caprini score 8, high risk for dvt, SCD ordered, surgical team to assess for dvt prophylaxis

## 2021-07-27 ENCOUNTER — APPOINTMENT (OUTPATIENT)
Dept: CT IMAGING | Facility: CLINIC | Age: 66
End: 2021-07-27
Payer: MEDICAID

## 2021-07-27 ENCOUNTER — OUTPATIENT (OUTPATIENT)
Dept: OUTPATIENT SERVICES | Facility: HOSPITAL | Age: 66
LOS: 1 days | End: 2021-07-27
Payer: MEDICAID

## 2021-07-27 DIAGNOSIS — Z98.890 OTHER SPECIFIED POSTPROCEDURAL STATES: Chronic | ICD-10-CM

## 2021-07-27 DIAGNOSIS — Z96.641 PRESENCE OF RIGHT ARTIFICIAL HIP JOINT: Chronic | ICD-10-CM

## 2021-07-27 DIAGNOSIS — Z95.5 PRESENCE OF CORONARY ANGIOPLASTY IMPLANT AND GRAFT: Chronic | ICD-10-CM

## 2021-07-27 DIAGNOSIS — Z00.8 ENCOUNTER FOR OTHER GENERAL EXAMINATION: ICD-10-CM

## 2021-07-27 PROBLEM — I21.9 ACUTE MYOCARDIAL INFARCTION, UNSPECIFIED: Chronic | Status: ACTIVE | Noted: 2020-11-25

## 2021-07-27 PROBLEM — M16.12 UNILATERAL PRIMARY OSTEOARTHRITIS, LEFT HIP: Chronic | Status: ACTIVE | Noted: 2021-07-22

## 2021-07-27 PROCEDURE — 73700 CT LOWER EXTREMITY W/O DYE: CPT | Mod: 26,LT

## 2021-07-27 PROCEDURE — 73700 CT LOWER EXTREMITY W/O DYE: CPT

## 2021-07-29 ENCOUNTER — APPOINTMENT (OUTPATIENT)
Dept: FAMILY MEDICINE | Facility: CLINIC | Age: 66
End: 2021-07-29
Payer: MEDICAID

## 2021-07-29 VITALS
BODY MASS INDEX: 27.92 KG/M2 | TEMPERATURE: 98.1 F | DIASTOLIC BLOOD PRESSURE: 84 MMHG | HEART RATE: 62 BPM | HEIGHT: 70 IN | WEIGHT: 195 LBS | OXYGEN SATURATION: 99 % | SYSTOLIC BLOOD PRESSURE: 140 MMHG

## 2021-07-29 DIAGNOSIS — M25.552 PAIN IN LEFT HIP: ICD-10-CM

## 2021-07-29 DIAGNOSIS — I10 ESSENTIAL (PRIMARY) HYPERTENSION: ICD-10-CM

## 2021-07-29 DIAGNOSIS — I25.10 ATHEROSCLEROTIC HEART DISEASE OF NATIVE CORONARY ARTERY W/OUT ANGINA PECTORIS: ICD-10-CM

## 2021-07-29 DIAGNOSIS — Z95.5 PRESENCE OF CORONARY ANGIOPLASTY IMPLANT AND GRAFT: ICD-10-CM

## 2021-07-29 PROCEDURE — 99214 OFFICE O/P EST MOD 30 MIN: CPT

## 2021-07-30 RX ORDER — ALPRAZOLAM 0.5 MG/1
0.5 TABLET ORAL
Qty: 30 | Refills: 0 | Status: ACTIVE | COMMUNITY
Start: 2020-01-21 | End: 1900-01-01

## 2021-08-01 PROBLEM — M25.552 LEFT HIP PAIN: Status: ACTIVE | Noted: 2021-05-25

## 2021-08-01 PROBLEM — Z95.5 CORONARY STENT PATENT: Status: ACTIVE | Noted: 2020-01-21

## 2021-08-01 PROBLEM — I25.10 CAD (CORONARY ARTERY DISEASE): Status: ACTIVE | Noted: 2020-01-21

## 2021-08-01 NOTE — HISTORY OF PRESENT ILLNESS
[Coronary Artery Disease] : coronary artery disease [No Pertinent Pulmonary History] : no history of asthma, COPD, sleep apnea, or smoking [No Adverse Anesthesia Reaction] : no adverse anesthesia reaction in self or family member [(Patient denies any chest pain, claudication, dyspnea on exertion, orthopnea, palpitations or syncope)] : Patient denies any chest pain, claudication, dyspnea on exertion, orthopnea, palpitations or syncope [Aortic Stenosis] : no aortic stenosis [Atrial Fibrillation] : no atrial fibrillation [Recent Myocardial Infarction] : no recent myocardial infarction [Implantable Device/Pacemaker] : no implantable device/pacemaker [Chronic Anticoagulation] : no chronic anticoagulation [Chronic Kidney Disease] : no chronic kidney disease [Diabetes] : no diabetes [FreeTextEntry1] : LEFT HIP REPLACEMENT [FreeTextEntry2] : 08/11/2021 [FreeTextEntry3] : DR. CRANDALL [FreeTextEntry4] : [facility] FAX # [phone] [FreeTextEntry7] : Pt will be cleared by cardiology

## 2021-08-01 NOTE — PLAN
[FreeTextEntry1] : Medically cleared for proposed surgery and anesthesia\par To have cardiology clearance

## 2021-08-07 DIAGNOSIS — Z01.818 ENCOUNTER FOR OTHER PREPROCEDURAL EXAMINATION: ICD-10-CM

## 2021-08-08 ENCOUNTER — APPOINTMENT (OUTPATIENT)
Dept: DISASTER EMERGENCY | Facility: CLINIC | Age: 66
End: 2021-08-08

## 2021-08-09 LAB — SARS-COV-2 N GENE NPH QL NAA+PROBE: NOT DETECTED

## 2021-08-10 ENCOUNTER — TRANSCRIPTION ENCOUNTER (OUTPATIENT)
Age: 66
End: 2021-08-10

## 2021-08-11 ENCOUNTER — TRANSCRIPTION ENCOUNTER (OUTPATIENT)
Age: 66
End: 2021-08-11

## 2021-08-11 ENCOUNTER — INPATIENT (INPATIENT)
Facility: HOSPITAL | Age: 66
LOS: 0 days | Discharge: HOME | DRG: 470 | End: 2021-08-12
Attending: ORTHOPAEDIC SURGERY | Admitting: ORTHOPAEDIC SURGERY
Payer: MEDICAID

## 2021-08-11 ENCOUNTER — NON-APPOINTMENT (OUTPATIENT)
Age: 66
End: 2021-08-11

## 2021-08-11 ENCOUNTER — APPOINTMENT (OUTPATIENT)
Dept: ORTHOPEDIC SURGERY | Facility: HOSPITAL | Age: 66
End: 2021-08-11

## 2021-08-11 VITALS
HEART RATE: 56 BPM | DIASTOLIC BLOOD PRESSURE: 105 MMHG | SYSTOLIC BLOOD PRESSURE: 150 MMHG | WEIGHT: 210.98 LBS | HEIGHT: 70 IN | OXYGEN SATURATION: 99 % | RESPIRATION RATE: 16 BRPM | TEMPERATURE: 98 F

## 2021-08-11 DIAGNOSIS — M16.12 UNILATERAL PRIMARY OSTEOARTHRITIS, LEFT HIP: ICD-10-CM

## 2021-08-11 DIAGNOSIS — Z95.5 PRESENCE OF CORONARY ANGIOPLASTY IMPLANT AND GRAFT: Chronic | ICD-10-CM

## 2021-08-11 DIAGNOSIS — Z96.641 PRESENCE OF RIGHT ARTIFICIAL HIP JOINT: Chronic | ICD-10-CM

## 2021-08-11 DIAGNOSIS — Z98.890 OTHER SPECIFIED POSTPROCEDURAL STATES: Chronic | ICD-10-CM

## 2021-08-11 LAB
GLUCOSE BLDC GLUCOMTR-MCNC: 128 MG/DL — HIGH (ref 70–99)
GLUCOSE BLDC GLUCOMTR-MCNC: 135 MG/DL — HIGH (ref 70–99)
GLUCOSE BLDC GLUCOMTR-MCNC: 93 MG/DL — SIGNIFICANT CHANGE UP (ref 70–99)

## 2021-08-11 PROCEDURE — 27130 TOTAL HIP ARTHROPLASTY: CPT | Mod: AS,LT

## 2021-08-11 PROCEDURE — 27130 TOTAL HIP ARTHROPLASTY: CPT | Mod: LT

## 2021-08-11 PROCEDURE — 73502 X-RAY EXAM HIP UNI 2-3 VIEWS: CPT | Mod: 26,LT

## 2021-08-11 PROCEDURE — 0055T BONE SRGRY CMPTR CT/MRI IMAG: CPT | Mod: LT

## 2021-08-11 RX ORDER — ACETAMINOPHEN 500 MG
975 TABLET ORAL ONCE
Refills: 0 | Status: COMPLETED | OUTPATIENT
Start: 2021-08-11 | End: 2021-08-11

## 2021-08-11 RX ORDER — HYDROMORPHONE HYDROCHLORIDE 2 MG/ML
0.5 INJECTION INTRAMUSCULAR; INTRAVENOUS; SUBCUTANEOUS EVERY 6 HOURS
Refills: 0 | Status: DISCONTINUED | OUTPATIENT
Start: 2021-08-11 | End: 2021-08-12

## 2021-08-11 RX ORDER — APREPITANT 80 MG/1
40 CAPSULE ORAL ONCE
Refills: 0 | Status: COMPLETED | OUTPATIENT
Start: 2021-08-11 | End: 2021-08-11

## 2021-08-11 RX ORDER — ACETAMINOPHEN 500 MG
975 TABLET ORAL EVERY 8 HOURS
Refills: 0 | Status: DISCONTINUED | OUTPATIENT
Start: 2021-08-11 | End: 2021-08-12

## 2021-08-11 RX ORDER — SENNA PLUS 8.6 MG/1
2 TABLET ORAL AT BEDTIME
Refills: 0 | Status: DISCONTINUED | OUTPATIENT
Start: 2021-08-11 | End: 2021-08-12

## 2021-08-11 RX ORDER — OXYCODONE HYDROCHLORIDE 5 MG/1
15 TABLET ORAL
Refills: 0 | Status: DISCONTINUED | OUTPATIENT
Start: 2021-08-11 | End: 2021-08-12

## 2021-08-11 RX ORDER — SODIUM CHLORIDE 9 MG/ML
1000 INJECTION, SOLUTION INTRAVENOUS
Refills: 0 | Status: DISCONTINUED | OUTPATIENT
Start: 2021-08-11 | End: 2021-08-11

## 2021-08-11 RX ORDER — CARVEDILOL PHOSPHATE 80 MG/1
1 CAPSULE, EXTENDED RELEASE ORAL
Qty: 0 | Refills: 0 | DISCHARGE

## 2021-08-11 RX ORDER — CELECOXIB 200 MG/1
400 CAPSULE ORAL ONCE
Refills: 0 | Status: COMPLETED | OUTPATIENT
Start: 2021-08-11 | End: 2021-08-11

## 2021-08-11 RX ORDER — ONDANSETRON 8 MG/1
4 TABLET, FILM COATED ORAL EVERY 6 HOURS
Refills: 0 | Status: DISCONTINUED | OUTPATIENT
Start: 2021-08-11 | End: 2021-08-11

## 2021-08-11 RX ORDER — ALPRAZOLAM 0.25 MG
0.5 TABLET ORAL DAILY
Refills: 0 | Status: DISCONTINUED | OUTPATIENT
Start: 2021-08-11 | End: 2021-08-12

## 2021-08-11 RX ORDER — CARVEDILOL PHOSPHATE 80 MG/1
12.5 CAPSULE, EXTENDED RELEASE ORAL EVERY 12 HOURS
Refills: 0 | Status: DISCONTINUED | OUTPATIENT
Start: 2021-08-12 | End: 2021-08-12

## 2021-08-11 RX ORDER — ONDANSETRON 8 MG/1
4 TABLET, FILM COATED ORAL EVERY 6 HOURS
Refills: 0 | Status: DISCONTINUED | OUTPATIENT
Start: 2021-08-11 | End: 2021-08-12

## 2021-08-11 RX ORDER — OXYCODONE HYDROCHLORIDE 5 MG/1
5 TABLET ORAL ONCE
Refills: 0 | Status: DISCONTINUED | OUTPATIENT
Start: 2021-08-11 | End: 2021-08-11

## 2021-08-11 RX ORDER — CEFAZOLIN SODIUM 1 G
2000 VIAL (EA) INJECTION
Refills: 0 | Status: COMPLETED | OUTPATIENT
Start: 2021-08-11 | End: 2021-08-12

## 2021-08-11 RX ORDER — SODIUM CHLORIDE 9 MG/ML
1000 INJECTION INTRAMUSCULAR; INTRAVENOUS; SUBCUTANEOUS
Refills: 0 | Status: DISCONTINUED | OUTPATIENT
Start: 2021-08-12 | End: 2021-08-12

## 2021-08-11 RX ORDER — SACUBITRIL AND VALSARTAN 24; 26 MG/1; MG/1
1 TABLET, FILM COATED ORAL
Qty: 0 | Refills: 0 | DISCHARGE

## 2021-08-11 RX ORDER — CELECOXIB 200 MG/1
200 CAPSULE ORAL EVERY 12 HOURS
Refills: 0 | Status: DISCONTINUED | OUTPATIENT
Start: 2021-08-13 | End: 2021-08-12

## 2021-08-11 RX ORDER — SACUBITRIL AND VALSARTAN 24; 26 MG/1; MG/1
1 TABLET, FILM COATED ORAL
Refills: 0 | Status: DISCONTINUED | OUTPATIENT
Start: 2021-08-13 | End: 2021-08-12

## 2021-08-11 RX ORDER — ALPRAZOLAM 0.25 MG
1 TABLET ORAL
Qty: 0 | Refills: 0 | DISCHARGE

## 2021-08-11 RX ORDER — KETOROLAC TROMETHAMINE 30 MG/ML
15 SYRINGE (ML) INJECTION EVERY 6 HOURS
Refills: 0 | Status: COMPLETED | OUTPATIENT
Start: 2021-08-11 | End: 2021-08-12

## 2021-08-11 RX ORDER — OXYCODONE HYDROCHLORIDE 5 MG/1
10 TABLET ORAL
Refills: 0 | Status: DISCONTINUED | OUTPATIENT
Start: 2021-08-11 | End: 2021-08-12

## 2021-08-11 RX ORDER — SODIUM CHLORIDE 9 MG/ML
500 INJECTION INTRAMUSCULAR; INTRAVENOUS; SUBCUTANEOUS ONCE
Refills: 0 | Status: DISCONTINUED | OUTPATIENT
Start: 2021-08-11 | End: 2021-08-12

## 2021-08-11 RX ORDER — MAGNESIUM HYDROXIDE 400 MG/1
30 TABLET, CHEWABLE ORAL DAILY
Refills: 0 | Status: DISCONTINUED | OUTPATIENT
Start: 2021-08-11 | End: 2021-08-12

## 2021-08-11 RX ORDER — SODIUM CHLORIDE 9 MG/ML
3 INJECTION INTRAMUSCULAR; INTRAVENOUS; SUBCUTANEOUS EVERY 8 HOURS
Refills: 0 | Status: DISCONTINUED | OUTPATIENT
Start: 2021-08-11 | End: 2021-08-11

## 2021-08-11 RX ORDER — CEFAZOLIN SODIUM 1 G
2000 VIAL (EA) INJECTION ONCE
Refills: 0 | Status: DISCONTINUED | OUTPATIENT
Start: 2021-08-11 | End: 2021-08-11

## 2021-08-11 RX ORDER — OXYCODONE HYDROCHLORIDE 5 MG/1
5 TABLET ORAL
Refills: 0 | Status: DISCONTINUED | OUTPATIENT
Start: 2021-08-11 | End: 2021-08-12

## 2021-08-11 RX ORDER — PANTOPRAZOLE SODIUM 20 MG/1
40 TABLET, DELAYED RELEASE ORAL
Refills: 0 | Status: DISCONTINUED | OUTPATIENT
Start: 2021-08-11 | End: 2021-08-12

## 2021-08-11 RX ORDER — FENTANYL CITRATE 50 UG/ML
50 INJECTION INTRAVENOUS
Refills: 0 | Status: DISCONTINUED | OUTPATIENT
Start: 2021-08-11 | End: 2021-08-11

## 2021-08-11 RX ADMIN — FENTANYL CITRATE 50 MICROGRAM(S): 50 INJECTION INTRAVENOUS at 17:21

## 2021-08-11 RX ADMIN — APREPITANT 40 MILLIGRAM(S): 80 CAPSULE ORAL at 09:26

## 2021-08-11 RX ADMIN — Medication 975 MILLIGRAM(S): at 21:03

## 2021-08-11 RX ADMIN — Medication 15 MILLIGRAM(S): at 17:58

## 2021-08-11 RX ADMIN — SENNA PLUS 2 TABLET(S): 8.6 TABLET ORAL at 21:03

## 2021-08-11 RX ADMIN — Medication 15 MILLIGRAM(S): at 18:35

## 2021-08-11 RX ADMIN — OXYCODONE HYDROCHLORIDE 5 MILLIGRAM(S): 5 TABLET ORAL at 16:40

## 2021-08-11 RX ADMIN — Medication 100 MILLIGRAM(S): at 21:03

## 2021-08-11 RX ADMIN — FENTANYL CITRATE 50 MICROGRAM(S): 50 INJECTION INTRAVENOUS at 16:40

## 2021-08-11 RX ADMIN — OXYCODONE HYDROCHLORIDE 15 MILLIGRAM(S): 5 TABLET ORAL at 20:14

## 2021-08-11 RX ADMIN — Medication 975 MILLIGRAM(S): at 09:26

## 2021-08-11 RX ADMIN — HYDROMORPHONE HYDROCHLORIDE 0.5 MILLIGRAM(S): 2 INJECTION INTRAMUSCULAR; INTRAVENOUS; SUBCUTANEOUS at 18:07

## 2021-08-11 RX ADMIN — OXYCODONE HYDROCHLORIDE 15 MILLIGRAM(S): 5 TABLET ORAL at 23:30

## 2021-08-11 RX ADMIN — HYDROMORPHONE HYDROCHLORIDE 0.5 MILLIGRAM(S): 2 INJECTION INTRAMUSCULAR; INTRAVENOUS; SUBCUTANEOUS at 18:35

## 2021-08-11 RX ADMIN — OXYCODONE HYDROCHLORIDE 15 MILLIGRAM(S): 5 TABLET ORAL at 19:47

## 2021-08-11 RX ADMIN — Medication 975 MILLIGRAM(S): at 22:13

## 2021-08-11 RX ADMIN — CELECOXIB 400 MILLIGRAM(S): 200 CAPSULE ORAL at 09:26

## 2021-08-11 RX ADMIN — Medication 15 MILLIGRAM(S): at 23:29

## 2021-08-11 NOTE — DISCHARGE NOTE PROVIDER - CARE PROVIDER_API CALL
Jaswinder Marina)  Orthopaedic Surgery  [address] [facility] [facility] [address]  [place] [address]  Phone: [phone]  Fax: [phone]  Follow Up Time:

## 2021-08-11 NOTE — DISCHARGE NOTE PROVIDER - NSDCMRMEDTOKEN_GEN_ALL_CORE_FT
acetaminophen 325 mg oral tablet: 3 tab(s) orally every 8 hours  ALPRAZolam 0.5 mg oral tablet: 1 tab(s) orally once (at bedtime), As Needed  Brilinta (ticagrelor) 90 mg oral tablet: 1 tab(s) orally 2 times a day, begin after completion of aspirin and plavix  carvedilol 12.5 mg oral tablet: 1 tab(s) orally 2 times a day  Entresto 49 mg-51 mg oral tablet: 1 tab(s) orally 2 times a day  oxyCODONE 15 mg oral tablet: 1 tab(s) orally every 4 to 6 hours, As Needed MDD:6   ALPRAZolam 0.5 mg oral tablet: 1 tab(s) orally once (at bedtime), As Needed  Aspirin Low Strength 81 mg oral delayed release tablet: 1 tab(s) orally once a day   Brilinta (ticagrelor) 90 mg oral tablet: 1 tab(s) orally 2 times a day, resume after lovenox is complete (8/19/2021)  carvedilol 12.5 mg oral tablet: 1 tab(s) orally 2 times a day  celecoxib 200 mg oral capsule: 1 cap(s) orally every 12 hours  Entresto 49 mg-51 mg oral tablet: 1 tab(s) orally 2 times a day  Lovenox 40 mg/0.4 mL injectable solution: 40 milligram(s) subcutaneously once a day   omeprazole 40 mg oral delayed release capsule: 1 cap(s) orally once a day   oxyCODONE 15 mg oral tablet: 1 tab(s) orally every 4 to 6 hours, As Needed MDD:6  oxyCODONE 5 mg oral tablet: 1 tab(s) orally every 4 hours while awake, As Needed - Pain  MDD:6  Senna S 50 mg-8.6 mg oral tablet: 2 tab(s) orally once a day (at bedtime)

## 2021-08-11 NOTE — DISCHARGE NOTE PROVIDER - CARE PROVIDERS DIRECT ADDRESSES
[email or web address]

## 2021-08-11 NOTE — DISCHARGE NOTE PROVIDER - HOSPITAL COURSE
The patient underwent a LEFT ANTERIOR TOTAL HIP REPLACEMENT on 8/11. The patient received antibiotics consistent with SCIP guidelines. The patient underwent the procedure and had no intra-operative complications. Post-operatively, the patient was seen by medicine and PT. The patient received LOVENOX for DVTP. The patient received pain medications per orthopedic pain management pathway and the pain was appropriately controlled. Patient was instructed on anterior total hip precautions by PT. The patient did not have any post-operative medical complications. The patient was discharged in stable condition.

## 2021-08-11 NOTE — PHYSICAL THERAPY INITIAL EVALUATION ADULT - ADDITIONAL COMMENTS
Pt lives in a house with his wife and son, who able to assist if needed. NO steps to get into the house and 12 steps with in the house (+) sary rails. pt owns a RW and SAC but does not use them. Pt was independent prior admission.

## 2021-08-11 NOTE — DISCHARGE NOTE PROVIDER - NSDCFUADDINST_GEN_ALL_CORE_FT
The patient will be seen in the office between 2-3 weeks for wound check.   **Your first post-operative visit has been scheduled prior to your admission. PLEASE CONTACT OFFICE TO CONFIRM THE APPOINTMENT DATE.   **  The silver based dressing is to be removed 7 days from the date of surgery (8/18).   ** CONTACT THE OFFICE IF THE FOLLOWING DEVELOP:  - the dressing becomes soiled or saturated  - you develop a fever greater that 101F  - the wound becomes red or you develop blistering around the wound  * Patient may shower after post-op day #3 (8/14).   * The patient will continue home PT consistent with anterior total hip replacement protocol and will continue to practice anterior total hip precautions for a minimum of 6 week. Transition to outpatient PT will occur at the time of the first office visit.   * The patient is FULL weight bearing.  * The patient will continue LOVENOX for 1 week and then resume hose dose of Brilinta for blood clot prevention.    * The patient will take OXYCODONE AND TYLENOL for pain control and adjust according to prescription and patient needs. Contact the office if pain increases while taking prescribed pain medications or related concerns develop.   * Celebrex will be taken twice daily for 3 weeks for pain control and prevention of excessive bone growth. Additional prescription may be requested at your office follow-up visit.  * The patient will take Senna S while taking oxycodone to prevent narcotic associated constipation.  Additionally, increase water intake (drink at least 8 glasses of water daily) and try adding fiber to the diet by eating fruits, vegetables and foods that are rich in grains. If constipation is experienced, contact the medical/primary care provider to discuss further treatment options.  * To avoid injury at home:  - continue use of rolling walker until cleared by physical therapist  - have family or friend remove all throw rug or objects in hallways that may present a trip hazard.  - if you experience any dizziness or medical concerns, call your medical doctor or 911.  * The implant may activate metal detection devices.

## 2021-08-11 NOTE — DISCHARGE NOTE NURSING/CASE MANAGEMENT/SOCIAL WORK - PATIENT PORTAL LINK FT
You can access the FollowMyHealth Patient Portal offered by [facility] by registering at the following website: [email or web address]. By joining [employer]’s FollowMyHealth portal, you will also be able to view your health information using other applications (apps) compatible with our system.

## 2021-08-11 NOTE — DISCHARGE NOTE PROVIDER - NSDCFUSCHEDAPPT_GEN_ALL_CORE_FT
GENI, NAYELY CORTÉS ; 08/31/2021 ; [facility] [address] [facility]  NAYELY ARECHIGA ; 09/21/2021 ; [facility] [address] [facility]

## 2021-08-12 VITALS
HEART RATE: 75 BPM | SYSTOLIC BLOOD PRESSURE: 155 MMHG | RESPIRATION RATE: 18 BRPM | DIASTOLIC BLOOD PRESSURE: 82 MMHG | OXYGEN SATURATION: 97 %

## 2021-08-12 DIAGNOSIS — I25.10 ATHEROSCLEROTIC HEART DISEASE OF NATIVE CORONARY ARTERY WITHOUT ANGINA PECTORIS: ICD-10-CM

## 2021-08-12 DIAGNOSIS — E78.5 HYPERLIPIDEMIA, UNSPECIFIED: ICD-10-CM

## 2021-08-12 DIAGNOSIS — I42.9 CARDIOMYOPATHY, UNSPECIFIED: ICD-10-CM

## 2021-08-12 LAB
ANION GAP SERPL CALC-SCNC: 10 MMOL/L — SIGNIFICANT CHANGE UP (ref 5–17)
BUN SERPL-MCNC: 13.1 MG/DL — SIGNIFICANT CHANGE UP (ref 8–20)
CALCIUM SERPL-MCNC: 8.7 MG/DL — SIGNIFICANT CHANGE UP (ref 8.6–10.2)
CHLORIDE SERPL-SCNC: 106 MMOL/L — SIGNIFICANT CHANGE UP (ref 98–107)
CO2 SERPL-SCNC: 25 MMOL/L — SIGNIFICANT CHANGE UP (ref 22–29)
COVID-19 SPIKE DOMAIN AB INTERP: POSITIVE
COVID-19 SPIKE DOMAIN ANTIBODY RESULT: 210 U/ML — HIGH
CREAT SERPL-MCNC: 0.97 MG/DL — SIGNIFICANT CHANGE UP (ref 0.5–1.3)
GLUCOSE SERPL-MCNC: 134 MG/DL — HIGH (ref 70–99)
HCT VFR BLD CALC: 37.7 % — LOW (ref 39–50)
HGB BLD-MCNC: 12.3 G/DL — LOW (ref 13–17)
MAGNESIUM SERPL-MCNC: 1.9 MG/DL — SIGNIFICANT CHANGE UP (ref 1.6–2.6)
MCHC RBC-ENTMCNC: 32.6 GM/DL — SIGNIFICANT CHANGE UP (ref 32–36)
MCHC RBC-ENTMCNC: 32.7 PG — SIGNIFICANT CHANGE UP (ref 27–34)
MCV RBC AUTO: 100.3 FL — HIGH (ref 80–100)
PLATELET # BLD AUTO: 144 K/UL — LOW (ref 150–400)
POTASSIUM SERPL-MCNC: 4.4 MMOL/L — SIGNIFICANT CHANGE UP (ref 3.5–5.3)
POTASSIUM SERPL-SCNC: 4.4 MMOL/L — SIGNIFICANT CHANGE UP (ref 3.5–5.3)
RBC # BLD: 3.76 M/UL — LOW (ref 4.2–5.8)
RBC # FLD: 12.6 % — SIGNIFICANT CHANGE UP (ref 10.3–14.5)
SARS-COV-2 IGG+IGM SERPL QL IA: 210 U/ML — HIGH
SARS-COV-2 IGG+IGM SERPL QL IA: POSITIVE
SODIUM SERPL-SCNC: 141 MMOL/L — SIGNIFICANT CHANGE UP (ref 135–145)
WBC # BLD: 7.08 K/UL — SIGNIFICANT CHANGE UP (ref 3.8–10.5)
WBC # FLD AUTO: 7.08 K/UL — SIGNIFICANT CHANGE UP (ref 3.8–10.5)

## 2021-08-12 PROCEDURE — 82962 GLUCOSE BLOOD TEST: CPT

## 2021-08-12 PROCEDURE — 80048 BASIC METABOLIC PNL TOTAL CA: CPT

## 2021-08-12 PROCEDURE — 97163 PT EVAL HIGH COMPLEX 45 MIN: CPT

## 2021-08-12 PROCEDURE — 97166 OT EVAL MOD COMPLEX 45 MIN: CPT

## 2021-08-12 PROCEDURE — 83735 ASSAY OF MAGNESIUM: CPT

## 2021-08-12 PROCEDURE — 86769 SARS-COV-2 COVID-19 ANTIBODY: CPT

## 2021-08-12 PROCEDURE — S2900: CPT

## 2021-08-12 PROCEDURE — 36415 COLL VENOUS BLD VENIPUNCTURE: CPT

## 2021-08-12 PROCEDURE — 73502 X-RAY EXAM HIP UNI 2-3 VIEWS: CPT

## 2021-08-12 PROCEDURE — 85027 COMPLETE CBC AUTOMATED: CPT

## 2021-08-12 PROCEDURE — 76000 FLUOROSCOPY <1 HR PHYS/QHP: CPT

## 2021-08-12 PROCEDURE — 99222 1ST HOSP IP/OBS MODERATE 55: CPT

## 2021-08-12 PROCEDURE — C1713: CPT

## 2021-08-12 PROCEDURE — 97116 GAIT TRAINING THERAPY: CPT

## 2021-08-12 PROCEDURE — C1776: CPT

## 2021-08-12 PROCEDURE — C1889: CPT

## 2021-08-12 RX ORDER — TICAGRELOR 90 MG/1
1 TABLET ORAL
Qty: 0 | Refills: 0 | DISCHARGE

## 2021-08-12 RX ORDER — OMEPRAZOLE 10 MG/1
1 CAPSULE, DELAYED RELEASE ORAL
Qty: 30 | Refills: 2
Start: 2021-08-12 | End: 2021-11-09

## 2021-08-12 RX ORDER — ASPIRIN/CALCIUM CARB/MAGNESIUM 324 MG
81 TABLET ORAL DAILY
Refills: 0 | Status: DISCONTINUED | OUTPATIENT
Start: 2021-08-12 | End: 2021-08-12

## 2021-08-12 RX ORDER — SENNOSIDES/DOCUSATE SODIUM 8.6MG-50MG
2 TABLET ORAL
Qty: 30 | Refills: 0
Start: 2021-08-12

## 2021-08-12 RX ORDER — ASPIRIN/CALCIUM CARB/MAGNESIUM 324 MG
1 TABLET ORAL
Qty: 90 | Refills: 0
Start: 2021-08-12 | End: 2021-09-10

## 2021-08-12 RX ORDER — OXYCODONE HYDROCHLORIDE 5 MG/1
1 TABLET ORAL
Qty: 30 | Refills: 0
Start: 2021-08-12

## 2021-08-12 RX ORDER — CELECOXIB 200 MG/1
1 CAPSULE ORAL
Qty: 42 | Refills: 0
Start: 2021-08-12

## 2021-08-12 RX ORDER — ENOXAPARIN SODIUM 100 MG/ML
40 INJECTION SUBCUTANEOUS
Qty: 6 | Refills: 0
Start: 2021-08-12 | End: 2021-08-17

## 2021-08-12 RX ORDER — ENOXAPARIN SODIUM 100 MG/ML
40 INJECTION SUBCUTANEOUS DAILY
Refills: 0 | Status: DISCONTINUED | OUTPATIENT
Start: 2021-08-12 | End: 2021-08-12

## 2021-08-12 RX ADMIN — ENOXAPARIN SODIUM 40 MILLIGRAM(S): 100 INJECTION SUBCUTANEOUS at 10:48

## 2021-08-12 RX ADMIN — Medication 15 MILLIGRAM(S): at 00:13

## 2021-08-12 RX ADMIN — Medication 100 MILLIGRAM(S): at 05:14

## 2021-08-12 RX ADMIN — CARVEDILOL PHOSPHATE 12.5 MILLIGRAM(S): 80 CAPSULE, EXTENDED RELEASE ORAL at 05:14

## 2021-08-12 RX ADMIN — OXYCODONE HYDROCHLORIDE 15 MILLIGRAM(S): 5 TABLET ORAL at 06:24

## 2021-08-12 RX ADMIN — Medication 975 MILLIGRAM(S): at 05:14

## 2021-08-12 RX ADMIN — Medication 15 MILLIGRAM(S): at 05:15

## 2021-08-12 RX ADMIN — OXYCODONE HYDROCHLORIDE 15 MILLIGRAM(S): 5 TABLET ORAL at 00:15

## 2021-08-12 RX ADMIN — OXYCODONE HYDROCHLORIDE 15 MILLIGRAM(S): 5 TABLET ORAL at 09:30

## 2021-08-12 RX ADMIN — OXYCODONE HYDROCHLORIDE 15 MILLIGRAM(S): 5 TABLET ORAL at 08:51

## 2021-08-12 RX ADMIN — Medication 975 MILLIGRAM(S): at 06:24

## 2021-08-12 RX ADMIN — Medication 15 MILLIGRAM(S): at 06:24

## 2021-08-12 RX ADMIN — OXYCODONE HYDROCHLORIDE 15 MILLIGRAM(S): 5 TABLET ORAL at 05:15

## 2021-08-12 RX ADMIN — PANTOPRAZOLE SODIUM 40 MILLIGRAM(S): 20 TABLET, DELAYED RELEASE ORAL at 05:16

## 2021-08-12 NOTE — CONSULT NOTE ADULT - ATTENDING COMMENTS
Patient is s/p L NIKKO , POD # 1 ,   Pain well controlled , no n/v , voiding without difficulty,   participating with physical therapy .      Vital Signs Last 24 Hrs  T(C): 36.8 (12 Aug 2021 08:54), Max: 36.9 (11 Aug 2021 23:13)  T(F): 98.2 (12 Aug 2021 08:54), Max: 98.4 (11 Aug 2021 23:13)  HR: 64 (12 Aug 2021 08:54) (50 - 68)  BP: 115/68 (12 Aug 2021 08:54) (115/68 - 198/103)  BP(mean): --  ABP: --  ABP(mean): --  RR: 18 (12 Aug 2021 08:54) (12 - 18)  SpO2: 98% (12 Aug 2021 08:54) (95% - 100%)    Lungs: CTA B/L   CVS: S1S2 , no rubs , no murmur   Abd: soft , bs+   L hip dressing + , clean and dry   < from: Xray Hip w/ Pelvis 2 or 3 Views, Left (08.11.21 @ 14:54) >       EXAM:  XR HIP WITH PELV 2-3V LT                          PROCEDURE DATE:  08/11/2021          INTERPRETATION:  Clinical history: 66-year-old male, OR.    AP view the pelvis and left hip demonstrate that the patient is post total arthroplasty, new on the left. No acute findings.    IMPRESSION:  Total left hip arthroplasty, unremarkable postoperative images    QIANA FREED DO; Attending Radiologist                          12.3   7.08  )-----------( 144      ( 12 Aug 2021 07:26 )             37.7   08-12    141  |  106  |  13.1  ----------------------------<  134<H>  4.4   |  25.0  |  0.97    Ca    8.7      12 Aug 2021 07:26  Mg     1.9     08-12    Above noted and reviewed with NP ,   agree with above ,   dvt prophylaxis and antiplatelet as per ortho and cardio .   Thank you for the courtesy of this consult .   Patient is medically optimized for discharge ,   will sigh off , please recall if any issue .

## 2021-08-12 NOTE — CONSULT NOTE ADULT - SUBJECTIVE AND OBJECTIVE BOX
HPI:  67 yo M PMH of HTN, HLD, CAD, MI in 2019 with 2 stents (on Brilinta), OA in bilateral hips s/p right THR 12/16/2020, presented with c/o worsening left hip pain. His left hip pain is localized to left groin is worse with weightbearing typically walking distance, rising position, and using stairs. He notes worsening stiffness of the hip which is now interfering with ADLs, now S/P left THR w/Dr Marina on 8/11/21        PAST MEDICAL & SURGICAL HISTORY:  HTN (hypertension)    High cholesterol    Myocardial infarct  12/2019, stents x2    Left lumbar radiculopathy    Back pain    CAD (coronary artery disease)    H/O heart artery stent    Hip arthritis    Unilateral primary osteoarthritis, left hip    H/O hernia repair  inquinal    Presence of stent in coronary artery  2 stents 12/2019    History of total right hip replacement  12/2020        MEDICATIONS  (STANDING):  acetaminophen   Tablet .. 975 milliGRAM(s) Oral every 8 hours  carvedilol 12.5 milliGRAM(s) Oral every 12 hours  ketorolac   Injectable 15 milliGRAM(s) IV Push every 6 hours  pantoprazole    Tablet 40 milliGRAM(s) Oral before breakfast  senna 2 Tablet(s) Oral at bedtime  sodium chloride 0.9% Bolus 500 milliLiter(s) IV Bolus once  sodium chloride 0.9%. 1000 milliLiter(s) (125 mL/Hr) IV Continuous <Continuous>    MEDICATIONS  (PRN):  ALPRAZolam 0.5 milliGRAM(s) Oral daily PRN anxiety  aluminum hydroxide/magnesium hydroxide/simethicone Suspension 30 milliLiter(s) Oral four times a day PRN Indigestion  HYDROmorphone  Injectable 0.5 milliGRAM(s) IV Push every 6 hours PRN breakthrough  magnesium hydroxide Suspension 30 milliLiter(s) Oral daily PRN Constipation  ondansetron Injectable 4 milliGRAM(s) IV Push every 6 hours PRN Nausea and/or Vomiting  oxyCODONE    IR 15 milliGRAM(s) Oral every 3 hours PRN Severe Pain (7 - 10)  oxyCODONE    IR 5 milliGRAM(s) Oral every 3 hours PRN Mild Pain (1 - 3)  oxyCODONE    IR 10 milliGRAM(s) Oral every 3 hours PRN Moderate Pain (4 - 6)      Allergies    No Known Allergies    Intolerances        SOCIAL HISTORY:  Social ETOH  Former smoker     Denies IVDA    FAMILY HISTORY:  FH: pancreatic cancer  father    FH: liver cancer  brother    Alcoholism in family member  brother    Family history of bone cancer (Father)        Vital Signs Last 24 Hrs  T(C): 36.9 (12 Aug 2021 05:13), Max: 36.9 (11 Aug 2021 23:13)  T(F): 98.4 (12 Aug 2021 05:13), Max: 98.4 (11 Aug 2021 23:13)  HR: 68 (12 Aug 2021 05:13) (50 - 68)  BP: 157/92 (12 Aug 2021 05:13) (123/76 - 198/103)  BP(mean): --  RR: 18 (12 Aug 2021 05:13) (12 - 18)  SpO2: 99% (12 Aug 2021 05:13) (95% - 100%)    LABS:                  RADIOLOGY & ADDITIONAL STUDIES: HPI:  67 yo M PMH of HTN, HLD, CAD, MI in 2019 with 2 stents (on Brilinta), OA in bilateral hips s/p right THR 12/16/2020, presented with c/o worsening left hip pain. His left hip pain is localized to left groin is worse with weightbearing typically walking distance, rising position, and using stairs. He notes worsening stiffness of the hip which is now interfering with ADLs, now S/P left THR w/Dr Marina on 8/11/21. Patient seen and examined POD#1. Pain controlled on current RX. Ambulated without difficulty with PT. C/O "not sleeping well" overnight. BP elevated this am but states that it's always high in the morning but is better after taking his medications. Taking po well, no nausea, vomiting.         PAST MEDICAL & SURGICAL HISTORY:  HTN (hypertension)    High cholesterol    Myocardial infarct  12/2019, stents x2    Left lumbar radiculopathy    Back pain    CAD (coronary artery disease)    H/O heart artery stent    Hip arthritis    Unilateral primary osteoarthritis, left hip    H/O hernia repair  inquinal    Presence of stent in coronary artery  2 stents 12/2019    History of total right hip replacement  12/2020        MEDICATIONS  (STANDING):  acetaminophen   Tablet .. 975 milliGRAM(s) Oral every 8 hours  carvedilol 12.5 milliGRAM(s) Oral every 12 hours  ketorolac   Injectable 15 milliGRAM(s) IV Push every 6 hours  pantoprazole    Tablet 40 milliGRAM(s) Oral before breakfast  senna 2 Tablet(s) Oral at bedtime  sodium chloride 0.9% Bolus 500 milliLiter(s) IV Bolus once  sodium chloride 0.9%. 1000 milliLiter(s) (125 mL/Hr) IV Continuous <Continuous>    MEDICATIONS  (PRN):  ALPRAZolam 0.5 milliGRAM(s) Oral daily PRN anxiety  aluminum hydroxide/magnesium hydroxide/simethicone Suspension 30 milliLiter(s) Oral four times a day PRN Indigestion  HYDROmorphone  Injectable 0.5 milliGRAM(s) IV Push every 6 hours PRN breakthrough  magnesium hydroxide Suspension 30 milliLiter(s) Oral daily PRN Constipation  ondansetron Injectable 4 milliGRAM(s) IV Push every 6 hours PRN Nausea and/or Vomiting  oxyCODONE    IR 15 milliGRAM(s) Oral every 3 hours PRN Severe Pain (7 - 10)  oxyCODONE    IR 5 milliGRAM(s) Oral every 3 hours PRN Mild Pain (1 - 3)  oxyCODONE    IR 10 milliGRAM(s) Oral every 3 hours PRN Moderate Pain (4 - 6)      Allergies    No Known Allergies    Intolerances        SOCIAL HISTORY:  Social ETOH  Former smoker     Denies IVDA    FAMILY HISTORY:  FH: pancreatic cancer  father    FH: liver cancer  brother    Alcoholism in family member  brother    Family history of bone cancer (Father)        Vital Signs Last 24 Hrs  T(C): 36.9 (12 Aug 2021 05:13), Max: 36.9 (11 Aug 2021 23:13)  T(F): 98.4 (12 Aug 2021 05:13), Max: 98.4 (11 Aug 2021 23:13)  HR: 68 (12 Aug 2021 05:13) (50 - 68)  BP: 157/92 (12 Aug 2021 05:13) (123/76 - 198/103)  BP(mean): --  RR: 18 (12 Aug 2021 05:13) (12 - 18)  SpO2: 99% (12 Aug 2021 05:13) (95% - 100%)    CM: No events overnight.     ROS:  Constitutional, Eyes, ENT, Cardiovascular, Respiratory,  Gastrointestinal, Genitourinary, Musculoskeletal, Neurological,  Integumentary, Psychiatric, Endocrine, Heme/Lymph are otherwise negative.    PHYSICAL EXAM:    General: Well developed; well nourished; in no acute distress  Eyes: PERRLA, EOMI; conjunctiva and sclera clear  Head: Normocephalic; atraumatic  ENMT: No nasal discharge; airway clear  Neck: Supple; non tender; no JVD  Respiratory: No wheezes, rales or rhonchi  Cardiovascular: Regular rate and rhythm. S1 and S2 Normal; No murmurs, gallops or rubs  Gastrointestinal: Soft non-tender non-distended; Normal bowel sounds  Extremities: Left hip dressing C/D/I.  No clubbing, cyanosis or edema  Vascular: Peripheral pulses palpable 2+ bilaterally  Neurological: Alert and oriented x4, no focal deficits  Skin: Warm and dry. No acute rash  Psychiatric: Cooperative and appropriate    LABS:                  RADIOLOGY & ADDITIONAL STUDIES:

## 2021-08-12 NOTE — CONSULT NOTE ADULT - SUBJECTIVE AND OBJECTIVE BOX
CC: hip pain    HPI: per chart review:   65 yo M PMH of HTN, HLD, CAD, MI in 2019 with 2 stents (on Brilinta), OA in bilateral hips s/p right THR 12/16/2020, presents with c/o worsening left hip pain. His left hip pain is localized to left groin is worse with weightbearing typically walking distance, rising position, and using stairs. He notes worsening stiffness of the hip which is now interfering with ADLs such as putting on socks and shoes. He has been diagnosed with left hip osteoarthritis in the past and treated conservatively thus far. Has tried therapy and exercise without relief. He takes Percocet 3 times daily for pain as per pain management. He had an ultrasound-guided intra-articular injection to the left hip in February 2021 with patient reporting only mild temporary relief that has worn off.Total left hip replacement 11-Aug-2021    .seen       T(C): 36.9 (08-12-21 @ 05:13), Max: 36.9 (08-11-21 @ 23:13)  HR: 68 (08-12-21 @ 05:13) (50 - 68)  BP: 157/92 (08-12-21 @ 05:13) (123/76 - 198/103)  RR: 18 (08-12-21 @ 05:13) (12 - 18)  SpO2: 99% (08-12-21 @ 05:13) (95% - 100%)      08-11-21 @ 07:01  -  08-12-21 @ 07:00  --------------------------------------------------------  IN: 0 mL / OUT: 500 mL / NET: -500 mL        acetaminophen   Tablet .. 975 milliGRAM(s) Oral every 8 hours  ALPRAZolam 0.5 milliGRAM(s) Oral daily PRN  aluminum hydroxide/magnesium hydroxide/simethicone Suspension 30 milliLiter(s) Oral four times a day PRN  carvedilol 12.5 milliGRAM(s) Oral every 12 hours  HYDROmorphone  Injectable 0.5 milliGRAM(s) IV Push every 6 hours PRN  ketorolac   Injectable 15 milliGRAM(s) IV Push every 6 hours  magnesium hydroxide Suspension 30 milliLiter(s) Oral daily PRN  ondansetron Injectable 4 milliGRAM(s) IV Push every 6 hours PRN  oxyCODONE    IR 15 milliGRAM(s) Oral every 3 hours PRN  oxyCODONE    IR 5 milliGRAM(s) Oral every 3 hours PRN  oxyCODONE    IR 10 milliGRAM(s) Oral every 3 hours PRN  pantoprazole    Tablet 40 milliGRAM(s) Oral before breakfast  senna 2 Tablet(s) Oral at bedtime  sodium chloride 0.9% Bolus 500 milliLiter(s) IV Bolus once                          12.3   7.08  )-----------( 144      ( 12 Aug 2021 07:26 )             37.7     08-12    141  |  106  |  13.1  ----------------------------<  134<H>  4.4   |  25.0  |  0.97    Ca    8.7      12 Aug 2021 07:26            Pain Service   [phone] CC: hip pain    HPI: per chart review:   67 yo M PMH of HTN, HLD, CAD, MI in 2019 with 2 stents (on Brilinta), OA in bilateral hips s/p right THR 12/16/2020, presents with c/o worsening left hip pain. His left hip pain is localized to left groin is worse with weightbearing typically walking distance, rising position, and using stairs. He notes worsening stiffness of the hip which is now interfering with ADLs such as putting on socks and shoes. He has been diagnosed with left hip osteoarthritis in the past and treated conservatively thus far. Has tried therapy and exercise without relief. He takes Percocet 3 times daily for pain as per pain management. He had an ultrasound-guided intra-articular injection to the left hip in February 2021 with patient reporting only mild temporary relief that has worn off.Total left hip replacement 11-Aug-2021    Interval Hx:  Patient seen during rounds  pain controlled   due for DC today  Patient denies sedation with medications          T(C): 36.9 (08-12-21 @ 05:13), Max: 36.9 (08-11-21 @ 23:13)  HR: 68 (08-12-21 @ 05:13) (50 - 68)  BP: 157/92 (08-12-21 @ 05:13) (123/76 - 198/103)  RR: 18 (08-12-21 @ 05:13) (12 - 18)  SpO2: 99% (08-12-21 @ 05:13) (95% - 100%)      08-11-21 @ 07:01  -  08-12-21 @ 07:00  --------------------------------------------------------  IN: 0 mL / OUT: 500 mL / NET: -500 mL        acetaminophen   Tablet .. 975 milliGRAM(s) Oral every 8 hours  ALPRAZolam 0.5 milliGRAM(s) Oral daily PRN  aluminum hydroxide/magnesium hydroxide/simethicone Suspension 30 milliLiter(s) Oral four times a day PRN  carvedilol 12.5 milliGRAM(s) Oral every 12 hours  HYDROmorphone  Injectable 0.5 milliGRAM(s) IV Push every 6 hours PRN  ketorolac   Injectable 15 milliGRAM(s) IV Push every 6 hours  magnesium hydroxide Suspension 30 milliLiter(s) Oral daily PRN  ondansetron Injectable 4 milliGRAM(s) IV Push every 6 hours PRN  oxyCODONE    IR 15 milliGRAM(s) Oral every 3 hours PRN  oxyCODONE    IR 5 milliGRAM(s) Oral every 3 hours PRN  oxyCODONE    IR 10 milliGRAM(s) Oral every 3 hours PRN  pantoprazole    Tablet 40 milliGRAM(s) Oral before breakfast  senna 2 Tablet(s) Oral at bedtime  sodium chloride 0.9% Bolus 500 milliLiter(s) IV Bolus once                          12.3   7.08  )-----------( 144      ( 12 Aug 2021 07:26 )             37.7     08-12    141  |  106  |  13.1  ----------------------------<  134<H>  4.4   |  25.0  |  0.97    Ca    8.7      12 Aug 2021 07:26            Pain Service   [phone]

## 2021-08-12 NOTE — PROGRESS NOTE ADULT - SUBJECTIVE AND OBJECTIVE BOX
Discussed with patient regarding his pain medication and the fact he has 90 tabs of percocet prescribed by his outside doctor at home. Considering 15 mg of oxycodone as he states works best for him at controlling his postop pain, I will prescribed 5mg oxycodone for him to add to the percocet at home. We agreed on this pain and I instructed him not to take Tylenol in addition to the percocet since percocet already has tylenol in it. Also spoke with patients cardiologist Dr. Engel who confirms it is ok to stop Brillinta, start lovenox 40mg daily and continue for 7 days and may restart Brillinta afterward. D/w Dr. Marina
NAYELY WAFER    [id number]    History: Patient is status post left anterior total hip arthroplasty on 8/11/2021, POD #1. Patient is doing well and is comfortable. The patient's pain is controlled using the prescribed pain medications. The patient is participating in physical therapy. Denies nausea, vomiting, chest pain, shortness of breath, abdominal pain or fever. No new complaints.                              12.3   7.08  )-----------( 144      ( 12 Aug 2021 07:26 )             37.7     08-12    141  |  106  |  13.1  ----------------------------<  134<H>  4.4   |  25.0  |  0.97    Ca    8.7      12 Aug 2021 07:26        MEDICATIONS  (STANDING):  acetaminophen   Tablet .. 975 milliGRAM(s) Oral every 8 hours  carvedilol 12.5 milliGRAM(s) Oral every 12 hours  ketorolac   Injectable 15 milliGRAM(s) IV Push every 6 hours  pantoprazole    Tablet 40 milliGRAM(s) Oral before breakfast  senna 2 Tablet(s) Oral at bedtime  sodium chloride 0.9% Bolus 500 milliLiter(s) IV Bolus once    MEDICATIONS  (PRN):  ALPRAZolam 0.5 milliGRAM(s) Oral daily PRN anxiety  aluminum hydroxide/magnesium hydroxide/simethicone Suspension 30 milliLiter(s) Oral four times a day PRN Indigestion  HYDROmorphone  Injectable 0.5 milliGRAM(s) IV Push every 6 hours PRN breakthrough  magnesium hydroxide Suspension 30 milliLiter(s) Oral daily PRN Constipation  ondansetron Injectable 4 milliGRAM(s) IV Push every 6 hours PRN Nausea and/or Vomiting  oxyCODONE    IR 15 milliGRAM(s) Oral every 3 hours PRN Severe Pain (7 - 10)  oxyCODONE    IR 5 milliGRAM(s) Oral every 3 hours PRN Mild Pain (1 - 3)  oxyCODONE    IR 10 milliGRAM(s) Oral every 3 hours PRN Moderate Pain (4 - 6)      Physical exam: The left hip dressing is clean, dry and intact. No drainage or discharge. No erythema is noted. No blistering. No ecchymosis. The calf is supple nontender. No calf tenderness. Sensation to light touch is grossly intact distally. The lateral cutaneous nerve is intact. Motor function distally is 5/5. No foot drop. 2+ dorsalis pedis pulse. Capillary refill is less than 2 seconds. No cyanosis.    Primary Orthopedic Assessment:  • s/p LEFT ANTERIOR total hip replacement pod #1  Plan:   • DVT prophylaxis: lovenox 40mg daily x 1 week and aspirin 81mg, resume brillenta after 1 week course of lovenox, including use of compression devices and ankle pumps  •  Continue physical therapy  • Weightbearing as tolerated of the left lower extremity with assistance of a walker  • Incentive spirometry encouraged  • Pain control as clinically indicated  • Anterior hip precautions reviewed with patient  • Discharge planning – anticipated discharge is Home when cleared by med/pt  
  History: Patient is status post LEFT anterior total hip arthroplasty on 8/11 POD # 0  Patient is doing well and is comfortable.   The patient's pain is controlled using the prescribed pain medications.   The patient is participating in physical therapy.   Denies nausea, vomiting, chest pain, shortness of breath, abdominal pain or fever. No new complaints.    Vital Signs Last 24 Hrs  T(C): 36.6 (11 Aug 2021 20:25), Max: 36.6 (11 Aug 2021 20:25)  T(F): 97.8 (11 Aug 2021 20:25), Max: 97.8 (11 Aug 2021 20:25)  HR: 66 (11 Aug 2021 20:25) (50 - 66)  BP: 153/91 (11 Aug 2021 20:25) (123/76 - 198/103)  BP(mean): --  RR: 18 (11 Aug 2021 20:25) (12 - 18)  SpO2: 97% (11 Aug 2021 20:25) (97% - 100%)    Physical exam:   The left hip dressing is clean, dry and intact. No drainage or discharge.   The calf is supple nontender. Passive range of motion is acceptable to due postoperative pain. No calf tenderness.   Sensation to light touch is grossly intact distally.   Motor function distally is 5/5. No foot drop.   2+ dorsalis pedis pulse. Capillary refill is less than 2 seconds. No cyanosis.    Primary Orthopedic Assessment:  • s/p LEFT ANTERIOR total hip replacement    Plan:   • DVT prophylaxis as prescribed, including use of compression devices and ankle pumps. Will confirm with cardio for DVTp in am. Lovenox ordered for now.   • Continue physical therapy  • Weightbearing as tolerated of the LEFT lower extremity with assistance of a walker  • Incentive spirometry encouraged  • Pain control as clinically indicated, PM consulted.   • Anterior hip precautions reviewed with patient  • ANCEF per scip
Post op xrays reviewed, stable total hip without evidence of periprosthetic fracture, clear for PT

## 2021-08-12 NOTE — CONSULT NOTE ADULT - PROBLEM SELECTOR RECOMMENDATION 9
DVT ppx: As per prior surgery patient was discharged on  mg BID and Plavix 75 mg daily x 2 weeks then resumed Brilinta, discussed with Ortho PA, will call Cardiologist/Orthopedic surgeon  to confirm appropriate DVT PPX    Bowel RX for opioid induced constipation ppx.

## 2021-08-12 NOTE — CONSULT NOTE ADULT - ASSESSMENT
acetaminophen   Tablet .. 975 milliGRAM(s) Oral every 8 hours  HYDROmorphone  Injectable 0.5 milliGRAM(s) IV Push every 6 hours PRN  ketorolac   Injectable 15 milliGRAM(s) IV Push every 6 hours  oxyCODONE    IR 15 milliGRAM(s) Oral every 3 hours PRN  oxyCODONE    IR 5 milliGRAM(s) Oral every 3 hours PRN  oxyCODONE    IR 10 milliGRAM(s) Oral every 3 hours PRN
65 yo M PMH of HTN, HLD, CAD, MI in 2019 with 2 stents (on Brilinta), OA in bilateral hips s/p right THR 12/16/2020, presented with c/o worsening left hip pain. His left hip pain is localized to left groin is worse with weightbearing typically walking distance, rising position, and using stairs. He notes worsening stiffness of the hip which is now interfering with ADLs, now S/P left THR w/Dr Marina on 8/11/21.

## 2021-08-17 NOTE — OCCUPATIONAL THERAPY INITIAL EVALUATION ADULT - NAME OF CLINICIAN
"Amaris Olivia LPN  "
RN (Stephanie)

## 2021-08-18 RX ORDER — OXYCODONE HYDROCHLORIDE 15 MG/1
15 TABLET ORAL
Qty: 42 | Refills: 0 | Status: ACTIVE | COMMUNITY
Start: 2021-08-18 | End: 1900-01-01

## 2021-08-19 RX ORDER — TICAGRELOR 90 MG/1
1 TABLET ORAL
Qty: 0 | Refills: 0 | DISCHARGE
Start: 2021-08-19

## 2021-08-25 RX ORDER — OXYCODONE HYDROCHLORIDE 15 MG/1
15 TABLET ORAL
Qty: 42 | Refills: 0 | Status: ACTIVE | COMMUNITY
Start: 2021-08-25 | End: 1900-01-01

## 2021-08-27 DIAGNOSIS — Z96.649 PRESENCE OF UNSPECIFIED ARTIFICIAL HIP JOINT: ICD-10-CM

## 2021-08-27 NOTE — H&P PST ADULT - RS GEN PE MLT RESP DETAILS PC
No. LEANDRA screening performed.  STOP BANG Legend: 0-2 = LOW Risk; 3-4 = INTERMEDIATE Risk; 5-8 = HIGH Risk
airway patent/breath sounds equal/good air movement/respirations non-labored/clear to auscultation bilaterally

## 2021-08-31 ENCOUNTER — APPOINTMENT (OUTPATIENT)
Dept: ORTHOPEDIC SURGERY | Facility: CLINIC | Age: 66
End: 2021-08-31
Payer: MEDICAID

## 2021-08-31 VITALS
WEIGHT: 195 LBS | HEIGHT: 70 IN | SYSTOLIC BLOOD PRESSURE: 199 MMHG | HEART RATE: 69 BPM | DIASTOLIC BLOOD PRESSURE: 110 MMHG | BODY MASS INDEX: 27.92 KG/M2

## 2021-08-31 PROCEDURE — 99024 POSTOP FOLLOW-UP VISIT: CPT

## 2021-08-31 PROCEDURE — 73502 X-RAY EXAM HIP UNI 2-3 VIEWS: CPT

## 2021-08-31 RX ORDER — OXYCODONE HYDROCHLORIDE 15 MG/1
15 TABLET ORAL
Qty: 30 | Refills: 0 | Status: ACTIVE | COMMUNITY
Start: 2021-08-31 | End: 1900-01-01

## 2021-08-31 RX ORDER — CELECOXIB 200 MG/1
200 CAPSULE ORAL TWICE DAILY
Qty: 42 | Refills: 0 | Status: ACTIVE | COMMUNITY
Start: 2021-08-31 | End: 1900-01-01

## 2021-08-31 NOTE — ADDENDUM
[FreeTextEntry1] : This note was authored by Rupal Vazquez working as a medical scribe for Dr. Jaswinder Marina. The note was reviewed, edited, and revised by Dr. Jaswinder Marina whom is in agreement with the exam findings, imaging findings, and treatment plan. 08/31/2021

## 2021-08-31 NOTE — HISTORY OF PRESENT ILLNESS
[de-identified] : s/p Left anterior total hip replacement with NIRAJ. (DOS: 8/11/21) [de-identified] : NAYELY ARECHIGA is doing well 3 week s/p left total hip replacement. He is participating in home physical therapy, as well as a home exercise program daily. Patient reports allergy to aspirin. He is taking Brillinta for DVT prophylaxis. Overall, he is very happy with the results of the surgery so far.  [de-identified] : Exam of the left hip shows a well healing incision hip flexion of 90 degrees, hip external rotation of 45 degrees. 5/5 motor strength bilaterally distally. Sensation intact distally. SLR with weakness.\par \par  [de-identified] :  X-ray: AP of the pelvis and 2 views of the left hip demonstrate a left total hip arthroplasty in stable position, with no evidence of fracture, loosening, or dislocation.\par  [de-identified] : The patient is doing very well 3 weeks after right anterior total hip replacement. The patient will be transitioned to outpatient physical therapy and a prescription was given for that. Patient will follow up with pain management.  The patient will continue Brillinta for DVT prophylaxis. Anterior hip precautions were reinforced. Overall the patient is very happy with their outcome so far. Followup in 3 weeks with repeat x-rays.

## 2021-09-02 NOTE — CONSULT NOTE ADULT - TIME-BASED BILLING (NON-CRITICAL CARE)
09/02/21 8:46 AM     See documentation in the [employer] SmartForm       Selma Mendoza
Time-based billing (NON-critical care)

## 2021-09-08 RX ORDER — OXYCODONE HYDROCHLORIDE 15 MG/1
15 TABLET ORAL
Qty: 42 | Refills: 0 | Status: ACTIVE | COMMUNITY
Start: 2021-09-08 | End: 1900-01-01

## 2021-09-11 NOTE — ED ADULT TRIAGE NOTE - NS ED NURSE AMBULANCES
Left HIPAA compliant message for patient's daughter, Pool Barajas,  to return call to [phone].   Re: Follow up: COPD, OV AVS, Review POC [facility]

## 2021-09-21 ENCOUNTER — APPOINTMENT (OUTPATIENT)
Dept: ORTHOPEDIC SURGERY | Facility: CLINIC | Age: 66
End: 2021-09-21

## 2021-09-21 DIAGNOSIS — Z96.642 PRESENCE OF LEFT ARTIFICIAL HIP JOINT: ICD-10-CM

## 2021-10-06 PROBLEM — I10 ESSENTIAL HYPERTENSION: Status: ACTIVE | Noted: 2017-09-11

## 2021-11-09 ENCOUNTER — APPOINTMENT (OUTPATIENT)
Dept: FAMILY MEDICINE | Facility: CLINIC | Age: 66
End: 2021-11-09

## 2023-04-25 NOTE — HISTORY OF PRESENT ILLNESS
Add 52 Modifier (Optional): no [FreeTextEntry1] : 65 y.o. M w/ h/o chronic bilateral hip pain 2' advanced femoroacetabular OA s/p R NIKKO (12/16/20) now s/p US guided LEFT hip CSI (2/24/21) returns to office for f/u.  Pt. states that his pain is worse when he is on his feet all day.  Pt. is maintained on oxycodone 5 mg; 2 tabs po tid which he tolerates well.  Pt. states that he is functioning better on the medication but still wants to wean.  He continues to deny GI or CNS side effects.  Pt. received his 2 doses of COVID vaccine.   Medical Necessity Information: It is in your best interest to select a reason for this procedure from the list below. All of these items fulfill various CMS LCD requirements except the new and changing color options. Consent: The patient's verbal consent was obtained including but not limited to risks of crusting, scabbing, blistering, scarring, darker or lighter pigmentary change, recurrence, incomplete removal and infection. Show Topical Anesthesia Variable?: Yes Medical Necessity Clause: This procedure was medically necessary because the lesions that were treated were: Detail Level: Detailed Post-Care Instructions: I reviewed with the patient in detail post-care instructions. Patient is to wear sunprotection, and avoid picking at any of the treated lesions. Pt may apply Vaseline to crusted or scabbing areas. Spray Paint Text: The liquid nitrogen was applied to the skin utilizing a spray paint frosting technique.

## 2023-06-28 NOTE — ASSESSMENT
[Colposcopy] : Colposcopy  [FreeTextEntry1] : 65 y.o. M w/ h/o chronic right hip pain 2' advanced femoroacetabular OA s/p R NIKKO (12/16/20).  Very weak right hip abductors.  Advised patient to c/w P.T. for strengthening and stabilization exercises.  We again discussed slow wean off his narcotic analgesics but patient is still struggling in P.T.  He does not want to change doctors and consult a chronic pain management MD.  I again reviewed R/B/A of narcotic analgesics including but not limited to respiratory depression, CNS depression, N/V/C, physiological and psychological addiction and untimely death if not used as directed. Pt. was again instructed not to take narcotic analgesics w/ the benzodiazepines prescribed by his PMD.  Pt. also understands that NSAIDs remain absolutely contraindicated given his recent cardiac history.  US guided LEFT HIP CSI planned for next week to decrease his reliance on narcotic analgesics.  Will eRx oxycodone 5 mg; 1-2 tabs po tid (#42) now and pt. will RTC 2 weeks.   I-STOP database checked.  \par  [Risks] : risks [Benefits] : benefits [Infection] : infection [Bleeding] : bleeding [LGSIL] : LGSIL [Colposcopy Adequate] : colposcopy adequate [SCI Fully Visualized] : SCI fully visualized [Lesion] : lesion seen [Biopsy] : biopsy taken [Hemostasis Obtained] : Hemostasis obtained [Tolerated Well] : the patient tolerated the procedure well [de-identified] : 1,ecc [de-identified] : awe, no punctation or mosaicism [de-identified] : 1, ecc

## 2023-10-19 NOTE — DISCHARGE NOTE PROVIDER - NSDCCPCAREPLAN_GEN_ALL_CORE_FT
Patient came in today 10/19/23 as a NV for Flu High Dose. I administered 0.7ml into right deltoid. Patient tolerated well and received a VIS.
Patient came in today 10/19/23 as a nurse visit for her B-12 injection. I administered 1 mL in patient right deltoid. Patient made aware to return in 30 days for another dose of her B12.     [address]  [id number]  Lot: [id number]  Exp: 04/2025
PRINCIPAL DISCHARGE DIAGNOSIS  Diagnosis: Unilateral primary osteoarthritis, left hip  Assessment and Plan of Treatment:

## 2024-07-26 NOTE — HISTORY OF PRESENT ILLNESS
Preferred Lab: [facility]  LOV: 03/08/2024  Next OV & Reason:  09/13/2024-physical    Patient was notified to fast 8-10 hours drinking only water/black coffee prior to having labs drawn and may need to submit urine sample.  Hemoglobin A1C will be ordered if patient has diabetes or prediabetes.  Lab orders will be placed by end of day:  yes  Patient requesting A1C: yes  Patient informed insurance may not cover A1C and they may be responsible for cost if denied by insurance:  yes   Patient requesting return call:  no       Medication requested: ALBUTEROL SULFATE (PROAIR HFA) 108 (90 BASE) MCG/ACT Inhalation Aero Soln (Discontinued)     Is patient requesting 30 or 90 day supply:  90    Pharmacy name/location:  [id number] [address] [phone], [phone] [[id number]]     LOV:  03/08/2024    Is the patient due for appointment: no (if so, please schedule)    Additional notes:  no    [FreeTextEntry1] : 65 y.o. M w/ h/o chronic right hip pain 2' advanced b/l femoroacetabular OA s/p R NIKKO (12/16/20) returns to office for f/u.  Pt. had recent post-op check w/ Dr. Marina (1/5/21) w/ NIKKO in good anatomical position.   Still walking w/ SC.  Pt. continues on oxycodone 5 mg; 1-2 tabs po tid prn which he tolerates well.  Last Rx given 1/4/21.  He has not tried to wean off the medications yet as he still has pain, especially at night.  He just started outpatient P.T. yesterday.  Of note, pt. states that he needs his left hip replaced as well.

## 2025-03-11 NOTE — PHYSICAL EXAM
Await period to f/u with  for egg freezing labs and workup.   F/u yearly for 2 spots at vaginal opening.    Will work on wt loss over the next 12-24mos.    Await pathology from mom, as that will determine for her genetic testing (breast and uterine CA).  Patient verbalized understanding of today's discussion with all questions and concerns addressed to her satisfaction.       [FreeTextEntry1] : NAD. \par A&Ox3\par Overweight but not obese\par No significant change in today's [id number]\par L-spine ROM - near full forward flexion w/o pain; 10-15' passive extension w/o pain\par RIGHT Hip -  improved ROM IR/ER w/o pain\par LEFT HIP - restricted IR/ER w/ pain (stable)\par Pelvic tilt ++ LEFT (not right)\par Seated slump - neg\par SEAN's - deferred\par MMT: 5/5 b/l HF/KE/DF/PF; 3+/5 RIGHT HIP ABDUCTORS\par DTR's 2+ knees/ankles\par SILT\par

## 2025-05-19 NOTE — REASON FOR VISIT
soft, nontender, nondistended, no guarding or rigidity, no masses palpable, normal bowel sounds, no hepatosplenomegaly, no rebound tenderness
[Follow-Up] : a follow-up visit